# Patient Record
Sex: FEMALE | Race: WHITE | NOT HISPANIC OR LATINO | Employment: FULL TIME | URBAN - METROPOLITAN AREA
[De-identification: names, ages, dates, MRNs, and addresses within clinical notes are randomized per-mention and may not be internally consistent; named-entity substitution may affect disease eponyms.]

---

## 2017-02-16 ENCOUNTER — OFFICE VISIT (OUTPATIENT)
Dept: OBSTETRICS AND GYNECOLOGY | Facility: CLINIC | Age: 62
End: 2017-02-16
Attending: OBSTETRICS & GYNECOLOGY
Payer: COMMERCIAL

## 2017-02-16 VITALS
BODY MASS INDEX: 25.27 KG/M2 | HEIGHT: 65 IN | WEIGHT: 151.69 LBS | SYSTOLIC BLOOD PRESSURE: 124 MMHG | DIASTOLIC BLOOD PRESSURE: 84 MMHG

## 2017-02-16 DIAGNOSIS — N95.2 POSTMENOPAUSAL ATROPHIC VAGINITIS: ICD-10-CM

## 2017-02-16 DIAGNOSIS — E04.9 ENLARGED THYROID GLAND: ICD-10-CM

## 2017-02-16 DIAGNOSIS — Z85.3 HISTORY OF BREAST CANCER: ICD-10-CM

## 2017-02-16 DIAGNOSIS — Z78.0 POST-MENOPAUSAL: ICD-10-CM

## 2017-02-16 DIAGNOSIS — Z01.419 WELL WOMAN EXAM WITH ROUTINE GYNECOLOGICAL EXAM: Primary | ICD-10-CM

## 2017-02-16 PROCEDURE — 99396 PREV VISIT EST AGE 40-64: CPT | Mod: S$GLB,,, | Performed by: OBSTETRICS & GYNECOLOGY

## 2017-02-16 PROCEDURE — 99999 PR PBB SHADOW E&M-EST. PATIENT-LVL III: CPT | Mod: PBBFAC,,, | Performed by: OBSTETRICS & GYNECOLOGY

## 2017-02-16 RX ORDER — FLUTICASONE PROPIONATE 50 MCG
2 SPRAY, SUSPENSION (ML) NASAL DAILY
Refills: 6 | Status: ON HOLD | COMMUNITY
Start: 2017-01-26 | End: 2019-07-05 | Stop reason: CLARIF

## 2017-02-16 RX ORDER — BROMPHENIRAMINE MALEATE, PSEUDOEPHEDRINE HYDROCHLORIDE, AND DEXTROMETHORPHAN HYDROBROMIDE 2; 30; 10 MG/5ML; MG/5ML; MG/5ML
SYRUP ORAL
Refills: 0 | Status: ON HOLD | COMMUNITY
Start: 2017-01-20 | End: 2019-07-05 | Stop reason: CLARIF

## 2017-02-16 RX ORDER — ESTRADIOL 10 UG/1
INSERT VAGINAL
Qty: 30 TABLET | Refills: 6 | Status: SHIPPED | OUTPATIENT
Start: 2017-02-16 | End: 2018-07-24

## 2017-02-16 RX ORDER — CLARITHROMYCIN 500 MG/1
500 TABLET, FILM COATED ORAL 2 TIMES DAILY
Refills: 0 | COMMUNITY
Start: 2017-01-20 | End: 2019-02-06

## 2017-02-16 NOTE — PROGRESS NOTES
Subjective:     Patient ID: Gissel Jamison is a 61 y.o. female.     Chief Complaint: Gynecologic Exam     History of Present Illness: This patient is a 61 y.o.  female, who presents to the GYN clinic for her gyn well woman yearly exam.  She she continues to have problems with vaginal dryness and discomfort with intercourse, she would like to retry the use of local vaginal estrogen therapy.    No LMP recorded. Patient has had a hysterectomy.    Review of Systems    GENERAL: No fever, chills, fatigability or weightchange  SKIN: No rashes, itching or changes in color or texture of skin.  HEAD: No headaches or recent head trauma.  EYES: Visual acuity fine. No photophobia,r diplopia.  EARS: Denies earache or vertigo  NOSE: No loss of smell, no epistaxis or postnasal drip.  MOUTH & THROAT: No hoarseness or change in voice.   NODES: Denies swollen glands.  CHEST: Denies GRAY, cyanosis, wheezing, cough and sputum production.  CARDIOVASCULAR: Denies chest pain, PND, orthopnea or reduced exercise tolerance.  ABDOMEN: Appetite fine. No weight loss. bloating, Denies diarrhea, abdominal pain, hematemesis or blood in stool.  URINARY: No flank pain, dysuria or hematuria.  PERIPHERAL VASCULAR: No claudication or cyanosis.Varicosities  MUSCULOSKELETAL: No joint stiffness or swelling. Denies back pain.muscle aches  NEUROLOGIC: No history of seizures, paralysis, alteration of gait or coordination.       Objective:       Physical Exam     APPEARANCE: Well nourished, well developed, in no acute distress.    GENITOURINARY:  Vulva: No lesions. Normal female genital architecture.  Urethral Meatus: Normal size and location, no lesions, no prolapse.  Urethra: No masses, tenderness, prolapse or scarring.  Vagina: thin, atrophic and with decreased rugae, no discharge, no significant cystocele or rectocele.  Cervix: Absent  Uterus: Absent  Adnexa: No masses, tenderness or CDS nodularity.  Anus Perineum: No lesions, no relaxation, no external  hemorrhoids.  Breasts: Symmetrical, no skin changes or visible lesions. No palpable masses, nipple discharge or adenopathy bilaterally.  Abdomen: No masses, tenderness, hernia or ascites, no hepatasplenomegaly  Neck: Supple. Symmetric without masses.  Enlarged and irregular  Skin: No rashes, lesions, ulcers, acne, hirsutism.  Respiratory: Breath sounds clear bilateraly. Good air movement. No rales. No wheezes.  Cardiovascular: Normal rate. Regular rhythm.  Peripheral/lower extremities: No edema, erythema or tenderness.  Lymphatic: No axillary, neck or groin nodes palp.  Mental Status: Alert, oriented x 3, normal affect and mood.    @PROCEDURE:@           Assessment:      1. Well woman exam with routine gynecological exam    2. Post-menopausal    3. Postmenopausal atrophic vaginitis    4. History of breast cancer               Plan:  1.  Referral to endocrine for evaluation of thyromegaly.  2.  Discussed the pros, cons, risks, and benefits of local vaginal estrogen therapy for the treatment of vaginal dryness and dyspareunia.  The patient wishes to restart the use of local vaginal therapy  3.  Return to clinic in 1 year.              No orders of the defined types were placed in this encounter.

## 2017-04-21 ENCOUNTER — HOSPITAL ENCOUNTER (OUTPATIENT)
Dept: PULMONOLOGY | Facility: OTHER | Age: 62
Discharge: HOME | End: 2017-04-21
Attending: INTERNAL MEDICINE
Payer: COMMERCIAL

## 2017-04-21 DIAGNOSIS — J45.22 MILD INTERMITTENT ASTHMA WITH STATUS ASTHMATICUS: ICD-10-CM

## 2017-04-21 PROCEDURE — 25000242 PHARM REV CODE 250 ALT 637 W/ HCPCS

## 2017-04-21 PROCEDURE — 94727 GAS DIL/WSHOT DETER LNG VOL: CPT

## 2017-04-21 PROCEDURE — 94729 DIFFUSING CAPACITY: CPT

## 2017-04-21 PROCEDURE — 94060 EVALUATION OF WHEEZING: CPT

## 2017-04-22 NOTE — PROCEDURES
DIAGNOSIS:  Asthma.    The patient is a 62-year-old female, 65 inches tall, 150 pounds.  She has a   forced vital capacity of 2.55 L, which is 81% of predicted.  FEV1 is 2.13, which   is 92% of predicted.  FEV1/FVC is 114% of predicted.  FEF 25-75 is 83%.  There   is a 17% response to bronchodilators, which is not considered significant   response.  There is no significant response to bronchodilators in the large   airways.  Lung volumes by nitrogen washout show a total lung capacity of 88%.    The expiratory reserve volume is decreased at -2%, which can be seen with   abdominal obesity or kyphoscoliosis.  There is no evidence of a diffusion   defect.  Flow volume loop is consistent with above.    ASSESSMENT:  1.  Spirometry is normal.  She does have a 17% response to bronchodilators in   the small airways, which is not considered to be a significant response.  2.  Normal lung volumes with decreased expiratory reserve volume as discussed   above.  3.  Normal diffusing capacity.  4.  Flow volume loop is consistent with above.      CCS/  dd: 04/21/2017 16:33:25 (CDT)  td: 04/21/2017 23:27:13 (CDT)  Doc ID   #[id number]  Job ID #[id number]    CC: Magen Huerta III M.D.

## 2018-06-14 ENCOUNTER — OFFICE VISIT (OUTPATIENT)
Dept: OBSTETRICS AND GYNECOLOGY | Facility: CLINIC | Age: 63
End: 2018-06-14
Attending: OBSTETRICS & GYNECOLOGY
Payer: COMMERCIAL

## 2018-06-14 VITALS
WEIGHT: 154.31 LBS | DIASTOLIC BLOOD PRESSURE: 80 MMHG | SYSTOLIC BLOOD PRESSURE: 144 MMHG | HEIGHT: 65 IN | BODY MASS INDEX: 25.71 KG/M2

## 2018-06-14 DIAGNOSIS — N95.2 POSTMENOPAUSAL ATROPHIC VAGINITIS: ICD-10-CM

## 2018-06-14 DIAGNOSIS — Z01.419 WELL WOMAN EXAM WITH ROUTINE GYNECOLOGICAL EXAM: Primary | ICD-10-CM

## 2018-06-14 DIAGNOSIS — Z85.3 HISTORY OF BREAST CANCER: ICD-10-CM

## 2018-06-14 DIAGNOSIS — Z78.0 POST-MENOPAUSAL: ICD-10-CM

## 2018-06-14 PROCEDURE — 99396 PREV VISIT EST AGE 40-64: CPT | Mod: S$GLB,,, | Performed by: OBSTETRICS & GYNECOLOGY

## 2018-06-14 PROCEDURE — 99999 PR PBB SHADOW E&M-EST. PATIENT-LVL III: CPT | Mod: PBBFAC,,, | Performed by: OBSTETRICS & GYNECOLOGY

## 2018-06-14 RX ORDER — PANTOPRAZOLE SODIUM 40 MG/1
40 TABLET, DELAYED RELEASE ORAL 2 TIMES DAILY
Refills: 2 | COMMUNITY
Start: 2018-05-22

## 2018-06-14 RX ORDER — ESTRADIOL 0.1 MG/G
CREAM VAGINAL
Qty: 85 G | Refills: 3 | Status: SHIPPED | OUTPATIENT
Start: 2018-06-14 | End: 2018-07-24 | Stop reason: SDUPTHER

## 2018-06-14 RX ORDER — ESZOPICLONE 3 MG/1
3 TABLET, FILM COATED ORAL NIGHTLY
Refills: 2 | COMMUNITY
Start: 2018-06-03 | End: 2019-08-15

## 2018-06-14 NOTE — PROGRESS NOTES
Subjective:     Patient ID: Gissel Jamison is a 63 y.o. female.     Chief Complaint: Gynecologic Exam     History of Present Illness: This patient is a 63 y.o.  female, who presents to the GYN clinic for her gyn well woman yearly exam.  She she complains of severe vaginal discomfort associated with intercourse which lubricants have not helped.  She occasionally will have vaginal bleeding associated with intercourse and request hormone treatment.  The patient complains of significant systemic postmenopausal symptoms and by pelvic heaviness, fullness and pressure.      No LMP recorded. Patient has had a hysterectomy.    Review of Systems    GENERAL: No fever, chills, fatigability or weightchange  SKIN: No rashes, itching or changes in color or texture of skin.  HEAD: No headaches or recent head trauma.  EYES: Visual acuity fine. No photophobia,r diplopia.  EARS: Denies earache or vertigo  NOSE: No loss of smell, no epistaxis or postnasal drip.  MOUTH & THROAT: No hoarseness or change in voice.   NODES: Denies swollen glands.  CHEST: Denies GRAY, cyanosis, wheezing, cough and sputum production.  CARDIOVASCULAR: Denies chest pain, PND, orthopnea or reduced exercise tolerance.  ABDOMEN: Appetite fine. No weight loss. bloating, Denies diarrhea, abdominal pain, hematemesis or blood in stool.  URINARY: No flank pain, dysuria or hematuria.  PERIPHERAL VASCULAR: No claudication or cyanosis.Varicosities  MUSCULOSKELETAL: No joint stiffness or swelling. Denies back pain.muscle aches  NEUROLOGIC: No history of seizures, paralysis, alteration of gait or coordination.       Objective:       Physical Exam     APPEARANCE: Well nourished, well developed, in no acute distress.    GENITOURINARY:  Vulva: No lesions. Normal female genital architecture.  Urethral Meatus: Normal size and location, no lesions, no prolapse.  Urethra: No masses, tenderness, prolapse or scarring.  Vagina: thin, atrophic and with decreased rugae, no discharge,  small cystocele and rectocele.  Cervix: Absent  Uterus: Absent  Adnexa: No masses, tenderness or CDS nodularity.  Anus Perineum: No lesions, no relaxation, no external hemorrhoids.  Breasts: asymmetrical, no skin changes or visible lesions. No palpable masses, surgical scars are noted ,nipple discharge or adenopathy bilaterally.  Abdomen: No masses, tenderness, hernia or ascites, no hepatasplenomegaly  Neck: Supple. Symmetric without masses. No thyromegaly.  Skin: No rashes, lesions, ulcers, acne, hirsutism.  Respiratory: Breath sounds clear bilateraly. Good air movement. No rales. No wheezes.  Cardiovascular: Normal rate. Regular rhythm.  Peripheral/lower extremities: No edema, erythema or tenderness.  Lymphatic: No axillary, neck or groin nodes palp.  Mental Status: Alert, oriented x 3, normal affect and mood.    @PROCEDURE:@           Assessment:      1. Well woman exam with routine gynecological exam    2. Post-menopausal    3. Postmenopausal atrophic vaginitis    4. History of breast cancer               Plan:  1.  Discussed the pros, cons, risks, and benefits of vaginal estrogen hormone therapy for the treatment of postmenopausal atrophic vaginitis.  The amount of absorption of vaginal estrogen and the risk that that absorption creates in terms of breast cancer recurrence is unknown.  The patient is aware of the fact that vaginal estrogen hormone therapy chloride increase her risk of a breast cancer recurrence but feels that the risk is for outweighed by the benefit and therefore request this hormone therapy be prescribed.  2.  Estrace vaginal cream 1 applicator a day for week one half applicator a day for week and one fourth applicator Monday and Thursday will be prescribed the patient will have a 3-4 week follow-up visit to determine the effect of this vaginal therapy on her vaginal atrophy.  3.  Discussed the use of the reduced dose of SSRI for menopausal symptoms.  Discussed the side effects which are  decreased libido and weight gain.  The patient will decide on whether she wishes to use the Paxil SSRI RI for postmenopausal symptoms.  4.  Refer to pelvic floor physical therapist for evaluation of pelvic heaviness and discomfort.  5.  Return to clinic in 3-4 weeks for   Prep wet and reevaluation.                No orders of the defined types were placed in this encounter.         and

## 2018-06-26 ENCOUNTER — TELEPHONE (OUTPATIENT)
Dept: OBSTETRICS AND GYNECOLOGY | Facility: CLINIC | Age: 63
End: 2018-06-26

## 2018-06-26 NOTE — TELEPHONE ENCOUNTER
----- Message from John Pyle sent at 6/26/2018  9:33 AM CDT -----  Contact: pt            Name of Who is Calling: pt      What is the request in detail: is having questions about a RX       Can the clinic reply by [facility]: no      What Number to Call Back if not in [facility]: [phone]

## 2018-07-24 ENCOUNTER — TELEPHONE (OUTPATIENT)
Dept: OBSTETRICS AND GYNECOLOGY | Facility: CLINIC | Age: 63
End: 2018-07-24

## 2018-07-24 DIAGNOSIS — N95.2 POSTMENOPAUSAL ATROPHIC VAGINITIS: ICD-10-CM

## 2018-07-24 DIAGNOSIS — Z78.0 POST-MENOPAUSAL: ICD-10-CM

## 2018-07-24 DIAGNOSIS — Z01.419 WELL WOMAN EXAM WITH ROUTINE GYNECOLOGICAL EXAM: ICD-10-CM

## 2018-07-24 DIAGNOSIS — Z85.3 HISTORY OF BREAST CANCER: ICD-10-CM

## 2018-07-24 RX ORDER — ESTRADIOL 0.1 MG/G
CREAM VAGINAL
Qty: 3 TUBE | Refills: 3 | Status: ON HOLD | OUTPATIENT
Start: 2018-07-24 | End: 2019-07-05 | Stop reason: CLARIF

## 2018-07-24 NOTE — TELEPHONE ENCOUNTER
States having problems with pharmacy and insurance co refilling estrace cream. Needs 3 mo supply and new rx to see if this can be corrected.

## 2018-07-24 NOTE — TELEPHONE ENCOUNTER
----- Message from Judy Howard sent at 7/24/2018 10:10 AM CDT -----  Contact: pt            Name of Who is Calling: pt      What is the request in detail: pt is having problems with estradiol (ESTRACE) 0.01 % (0.1 mg/gram) vaginal cream. Wants to explain to the nurse.      Can the clinic reply by [facility]: no      What Number to Call Back if not in [facility]: [phone]

## 2019-02-08 PROBLEM — M47.892 OTHER SPONDYLOSIS, CERVICAL REGION: Status: ACTIVE | Noted: 2019-02-08

## 2019-07-04 ENCOUNTER — HOSPITAL ENCOUNTER (EMERGENCY)
Facility: HOSPITAL | Age: 64
Discharge: ANOTHER HOSPITAL | End: 2019-07-04
Attending: SURGERY
Payer: COMMERCIAL

## 2019-07-04 ENCOUNTER — HOSPITAL ENCOUNTER (INPATIENT)
Facility: HOSPITAL | Age: 64
LOS: 14 days | Discharge: HOME | DRG: 020 | End: 2019-07-18
Attending: PSYCHIATRY & NEUROLOGY | Admitting: PSYCHIATRY & NEUROLOGY
Payer: COMMERCIAL

## 2019-07-04 VITALS
TEMPERATURE: 99 F | HEIGHT: 65 IN | OXYGEN SATURATION: 95 % | RESPIRATION RATE: 20 BRPM | DIASTOLIC BLOOD PRESSURE: 64 MMHG | BODY MASS INDEX: 25.83 KG/M2 | WEIGHT: 155 LBS | HEART RATE: 83 BPM | SYSTOLIC BLOOD PRESSURE: 146 MMHG

## 2019-07-04 DIAGNOSIS — I60.9 SAH (SUBARACHNOID HEMORRHAGE): ICD-10-CM

## 2019-07-04 DIAGNOSIS — Z29.89 SEIZURE PROPHYLAXIS: ICD-10-CM

## 2019-07-04 DIAGNOSIS — I60.8 SUBARACHNOID HEMORRHAGE DUE TO RUPTURED ANEURYSM: Primary | ICD-10-CM

## 2019-07-04 DIAGNOSIS — I67.1 ANEURYSM OF ANTERIOR COMMUNICATING ARTERY: ICD-10-CM

## 2019-07-04 DIAGNOSIS — I60.2 NONTRAUMATIC SUBARACHNOID HEMORRHAGE FROM ANTERIOR COMMUNICATING ARTERY: ICD-10-CM

## 2019-07-04 DIAGNOSIS — I62.9 INTRACRANIAL BLEED: ICD-10-CM

## 2019-07-04 LAB
ABO + RH BLD: NORMAL
ALBUMIN SERPL BCP-MCNC: 4.3 G/DL (ref 3.5–5.2)
ALP SERPL-CCNC: 103 U/L (ref 38–126)
ALT SERPL W/O P-5'-P-CCNC: 19 U/L (ref 10–44)
ANION GAP SERPL CALC-SCNC: 9 MMOL/L (ref 8–16)
APTT BLDCRRT: 33 SEC (ref 21–32)
AST SERPL-CCNC: 20 U/L (ref 15–46)
BASOPHILS # BLD AUTO: 0.02 K/UL (ref 0–0.2)
BASOPHILS NFR BLD: 0.3 % (ref 0–1.9)
BILIRUB SERPL-MCNC: 0.7 MG/DL (ref 0.1–1)
BLD GP AB SCN CELLS X3 SERPL QL: NORMAL
BUN SERPL-MCNC: 17 MG/DL (ref 7–17)
CALCIUM SERPL-MCNC: 9.7 MG/DL (ref 8.7–10.5)
CHLORIDE SERPL-SCNC: 104 MMOL/L (ref 95–110)
CO2 SERPL-SCNC: 29 MMOL/L (ref 23–29)
CREAT SERPL-MCNC: 0.76 MG/DL (ref 0.5–1.4)
DIFFERENTIAL METHOD: ABNORMAL
EOSINOPHIL # BLD AUTO: 0.1 K/UL (ref 0–0.5)
EOSINOPHIL NFR BLD: 1 % (ref 0–8)
ERYTHROCYTE [DISTWIDTH] IN BLOOD BY AUTOMATED COUNT: 14 % (ref 11.5–14.5)
EST. GFR  (AFRICAN AMERICAN): >60 ML/MIN/1.73 M^2
EST. GFR  (NON AFRICAN AMERICAN): >60 ML/MIN/1.73 M^2
GLUCOSE SERPL-MCNC: 126 MG/DL (ref 70–110)
HCT VFR BLD AUTO: 41.4 % (ref 37–48.5)
HGB BLD-MCNC: 12.7 G/DL (ref 12–16)
INR PPP: 1.1 (ref 0.8–1.2)
LYMPHOCYTES # BLD AUTO: 2 K/UL (ref 1–4.8)
LYMPHOCYTES NFR BLD: 28.1 % (ref 18–48)
MCH RBC QN AUTO: 26.9 PG (ref 27–31)
MCHC RBC AUTO-ENTMCNC: 30.7 G/DL (ref 32–36)
MCV RBC AUTO: 88 FL (ref 82–98)
MONOCYTES # BLD AUTO: 0.7 K/UL (ref 0.3–1)
MONOCYTES NFR BLD: 9.5 % (ref 4–15)
NEUTROPHILS # BLD AUTO: 4.3 K/UL (ref 1.8–7.7)
NEUTROPHILS NFR BLD: 61.1 % (ref 38–73)
PLATELET # BLD AUTO: 286 K/UL (ref 150–350)
PMV BLD AUTO: 9.2 FL (ref 9.2–12.9)
POTASSIUM SERPL-SCNC: 3.9 MMOL/L (ref 3.5–5.1)
PROT SERPL-MCNC: 7.6 G/DL (ref 6–8.4)
PROTHROMBIN TIME: 11.8 SEC (ref 9–12.5)
RBC # BLD AUTO: 4.72 M/UL (ref 4–5.4)
SODIUM SERPL-SCNC: 142 MMOL/L (ref 136–145)
WBC # BLD AUTO: 7.08 K/UL (ref 3.9–12.7)

## 2019-07-04 PROCEDURE — 25000003 PHARM REV CODE 250: Performed by: EMERGENCY MEDICINE

## 2019-07-04 PROCEDURE — 99291 CRITICAL CARE FIRST HOUR: CPT | Mod: ,,, | Performed by: PSYCHIATRY & NEUROLOGY

## 2019-07-04 PROCEDURE — 85025 COMPLETE CBC W/AUTO DIFF WBC: CPT | Mod: ER

## 2019-07-04 PROCEDURE — 96375 TX/PRO/DX INJ NEW DRUG ADDON: CPT

## 2019-07-04 PROCEDURE — 99291 PR CRITICAL CARE, E/M 30-74 MINUTES: ICD-10-PCS | Mod: ,,, | Performed by: EMERGENCY MEDICINE

## 2019-07-04 PROCEDURE — 99291 CRITICAL CARE FIRST HOUR: CPT | Mod: 25,ER

## 2019-07-04 PROCEDURE — 99222 1ST HOSP IP/OBS MODERATE 55: CPT | Mod: ,,, | Performed by: NEUROLOGICAL SURGERY

## 2019-07-04 PROCEDURE — 25000003 PHARM REV CODE 250: Mod: ER | Performed by: SURGERY

## 2019-07-04 PROCEDURE — 99291 CRITICAL CARE FIRST HOUR: CPT | Mod: ,,, | Performed by: EMERGENCY MEDICINE

## 2019-07-04 PROCEDURE — 85610 PROTHROMBIN TIME: CPT | Mod: ER

## 2019-07-04 PROCEDURE — 93010 ELECTROCARDIOGRAM REPORT: CPT | Mod: ,,, | Performed by: INTERNAL MEDICINE

## 2019-07-04 PROCEDURE — 99222 PR INITIAL HOSPITAL CARE,LEVL II: ICD-10-PCS | Mod: ,,, | Performed by: NEUROLOGICAL SURGERY

## 2019-07-04 PROCEDURE — 27000221 HC OXYGEN, UP TO 24 HOURS

## 2019-07-04 PROCEDURE — 93010 EKG 12-LEAD: ICD-10-PCS | Mod: ,,, | Performed by: INTERNAL MEDICINE

## 2019-07-04 PROCEDURE — 25000003 PHARM REV CODE 250

## 2019-07-04 PROCEDURE — 96375 TX/PRO/DX INJ NEW DRUG ADDON: CPT | Mod: ER

## 2019-07-04 PROCEDURE — 25500020 PHARM REV CODE 255: Performed by: EMERGENCY MEDICINE

## 2019-07-04 PROCEDURE — 63600175 PHARM REV CODE 636 W HCPCS: Mod: ER | Performed by: SURGERY

## 2019-07-04 PROCEDURE — 80053 COMPREHEN METABOLIC PANEL: CPT | Mod: ER

## 2019-07-04 PROCEDURE — 96366 THER/PROPH/DIAG IV INF ADDON: CPT

## 2019-07-04 PROCEDURE — 85730 THROMBOPLASTIN TIME PARTIAL: CPT | Mod: ER

## 2019-07-04 PROCEDURE — 86850 RBC ANTIBODY SCREEN: CPT

## 2019-07-04 PROCEDURE — 20000000 HC ICU ROOM

## 2019-07-04 PROCEDURE — 99291 CRITICAL CARE FIRST HOUR: CPT | Mod: 25

## 2019-07-04 PROCEDURE — 94761 N-INVAS EAR/PLS OXIMETRY MLT: CPT

## 2019-07-04 PROCEDURE — 93005 ELECTROCARDIOGRAM TRACING: CPT | Mod: ER

## 2019-07-04 PROCEDURE — 63600175 PHARM REV CODE 636 W HCPCS

## 2019-07-04 PROCEDURE — 96365 THER/PROPH/DIAG IV INF INIT: CPT | Mod: ER

## 2019-07-04 PROCEDURE — 99291 PR CRITICAL CARE, E/M 30-74 MINUTES: ICD-10-PCS | Mod: ,,, | Performed by: PSYCHIATRY & NEUROLOGY

## 2019-07-04 RX ORDER — NICARDIPINE HYDROCHLORIDE 0.2 MG/ML
5 INJECTION INTRAVENOUS CONTINUOUS
Status: DISCONTINUED | OUTPATIENT
Start: 2019-07-04 | End: 2019-07-06

## 2019-07-04 RX ORDER — NICARDIPINE HYDROCHLORIDE 0.2 MG/ML
1 INJECTION INTRAVENOUS CONTINUOUS
Status: DISCONTINUED | OUTPATIENT
Start: 2019-07-04 | End: 2019-07-04 | Stop reason: HOSPADM

## 2019-07-04 RX ORDER — ONDANSETRON 2 MG/ML
4 INJECTION INTRAMUSCULAR; INTRAVENOUS
Status: COMPLETED | OUTPATIENT
Start: 2019-07-04 | End: 2019-07-04

## 2019-07-04 RX ORDER — ONDANSETRON 2 MG/ML
4 INJECTION INTRAMUSCULAR; INTRAVENOUS EVERY 6 HOURS PRN
Status: DISCONTINUED | OUTPATIENT
Start: 2019-07-04 | End: 2019-07-18 | Stop reason: HOSPADM

## 2019-07-04 RX ORDER — ACETAMINOPHEN 325 MG/1
650 TABLET ORAL EVERY 6 HOURS PRN
Status: DISCONTINUED | OUTPATIENT
Start: 2019-07-04 | End: 2019-07-11

## 2019-07-04 RX ORDER — SODIUM CHLORIDE 9 MG/ML
1000 INJECTION, SOLUTION INTRAVENOUS
Status: COMPLETED | OUTPATIENT
Start: 2019-07-04 | End: 2019-07-04

## 2019-07-04 RX ORDER — MORPHINE SULFATE 4 MG/ML
4 INJECTION, SOLUTION INTRAMUSCULAR; INTRAVENOUS
Status: COMPLETED | OUTPATIENT
Start: 2019-07-04 | End: 2019-07-04

## 2019-07-04 RX ORDER — ONDANSETRON 2 MG/ML
INJECTION INTRAMUSCULAR; INTRAVENOUS
Status: COMPLETED
Start: 2019-07-04 | End: 2019-07-04

## 2019-07-04 RX ORDER — LEVOCETIRIZINE DIHYDROCHLORIDE 5 MG/1
5 TABLET, FILM COATED ORAL DAILY
COMMUNITY
End: 2019-08-15

## 2019-07-04 RX ORDER — ACETAMINOPHEN 325 MG/1
TABLET ORAL
Status: COMPLETED
Start: 2019-07-04 | End: 2019-07-04

## 2019-07-04 RX ADMIN — NICARDIPINE HYDROCHLORIDE 4 MG/HR: 0.2 INJECTION, SOLUTION INTRAVENOUS at 01:07

## 2019-07-04 RX ADMIN — ONDANSETRON 4 MG: 2 INJECTION INTRAMUSCULAR; INTRAVENOUS at 06:07

## 2019-07-04 RX ADMIN — NICARDIPINE HYDROCHLORIDE 5 MG/HR: 0.2 INJECTION, SOLUTION INTRAVENOUS at 02:07

## 2019-07-04 RX ADMIN — SODIUM CHLORIDE 1000 ML: 0.9 INJECTION, SOLUTION INTRAVENOUS at 11:07

## 2019-07-04 RX ADMIN — IOHEXOL 100 ML: 350 INJECTION, SOLUTION INTRAVENOUS at 03:07

## 2019-07-04 RX ADMIN — ONDANSETRON 4 MG: 2 INJECTION INTRAMUSCULAR; INTRAVENOUS at 11:07

## 2019-07-04 RX ADMIN — ACETAMINOPHEN 650 MG: 325 TABLET ORAL at 06:07

## 2019-07-04 RX ADMIN — NICARDIPINE HYDROCHLORIDE 3 MG/HR: 0.2 INJECTION, SOLUTION INTRAVENOUS at 12:07

## 2019-07-04 RX ADMIN — MORPHINE SULFATE 4 MG: 4 INJECTION INTRAVENOUS at 11:07

## 2019-07-04 RX ADMIN — NICARDIPINE HYDROCHLORIDE 5 MG/HR: 0.2 INJECTION, SOLUTION INTRAVENOUS at 06:07

## 2019-07-04 RX ADMIN — NICARDIPINE HYDROCHLORIDE 1 MG/HR: 0.2 INJECTION, SOLUTION INTRAVENOUS at 12:07

## 2019-07-04 NOTE — HPI
64 y F with medical h.o breast cancer s/p mastectomy, lumpectomy, who presented to the ER as a transfer from [facility] as SAH detected on CTH.  Patient's symptoms started on Tuesday 7/2 when she was undergoing a CT abdomen for evaluation of indigestion. She started to feel a pounding and congestion of head and ears. She did not seek medical attention till today 7/4 when she went to urgent care. Urgent care doc did not feel comfortable treating it as migraine and sent patient to ER; where CT head detected SAH. She is transferred here to [facility].  Here a CTA detected CAROLINA aneurysm and patient is being sent for angio and further neurosurg intervention,.

## 2019-07-04 NOTE — H&P
[facility] [address]  Neurology  History & Physical    Patient Name: Gissel Jamison  MRN: [id number]   Admission Date: 7/4/2019  Code Status: No Order   Attending Provider: YAIMA Alvarez  Primary Care Physician: Magen Huerta Iii, MD  Principal Problem:<principal problem not specified>    Subjective:     Chief Complaint:  SAH on CTH    HPI:   64 y F with medical h.o breast cancer s/p mastectomy, lumpectomy, who presented to the ER as a transfer from [facility] as SAH detected on CTH.  Patient's symptoms started on Tuesday 7/2 when she was undergoing a CT abdomen for evaluation of indigestion. She started to feel a pounding and congestion of head and ears. She did not seek medical attention till today 7/4 when she went to urgent care. Urgent care doc did not feel comfortable treating it as migraine and sent patient to ER; where CT head detected SAH. She is transferred here to [facility].  Here a CTA detected CAROLINA aneurysm and patient is being sent for angio and further neurosurg intervention,.    Past Medical History:   Diagnosis Date    Asthma     Breast cancer     Fibromyalgia     PONV (postoperative nausea and vomiting)     for general anesthesia       Past Surgical History:   Procedure Laterality Date    Arm Surgery      torn tendon    BACK SURGERY      x2    HYSTERECTOMY      RENETTA    MASTECTOMY      with reconstruction left breast    OOPHORECTOMY      RADIOFREQUENCY THERMAL COAGULATION, NERVE, SPINAL, CERVICAL, POSTERIOR RAMUS, MEDIAL BRANCH Left 2/8/2019    Performed by Raymond Britton MD at [facility] OR    Tonsillectomy      TONSILLECTOMY         Review of patient's allergies indicates:   Allergen Reactions    Codeine Nausea And Vomiting    Pcn [penicillins] Nausea And Vomiting    Sulfa (sulfonamide antibiotics) Nausea And Vomiting    Sulfanilamide        Current Neurological Medications:     Current Facility-Administered Medications on File Prior to Encounter   Medication    0.9%   NaCl infusion    [COMPLETED] 0.9%  NaCl infusion    [COMPLETED] morphine injection 4 mg    [COMPLETED] ondansetron injection 4 mg    sodium chloride 0.9% flush 10 mL    [DISCONTINUED] lorazepam (ATIVAN) injection 1 mg    [DISCONTINUED] niCARdipine 40 mg/200 mL infusion     Current Outpatient Medications on File Prior to Encounter   Medication Sig    eszopiclone 3 mg Tab Take 3 mg by mouth nightly.    levocetirizine (XYZAL) 5 MG tablet Take 5 mg by mouth once daily.    montelukast (SINGULAIR) 10 mg tablet     pantoprazole (PROTONIX) 40 MG tablet Take 40 mg by mouth every morning.    ADVAIR DISKUS 250-50 mcg/dose diskus inhaler     brompheniramine-pseudoeph-DM 2-30-10 mg/5 mL Syrp TAKE 1 TEASPOON EVERY 6 HOURS AS NEEDED FOR CONGESTION/COUGH    estradiol (ESTRACE) 0.01 % (0.1 mg/gram) vaginal cream Insert 1gm vaginally HS on Mondays and Thursdays    fluticasone (FLONASE) 50 mcg/actuation nasal spray 2 sprays by Each Nare route once daily.    PROAIR HFA 90 mcg/actuation inhaler Inhale 1 puff into the lungs every 4 to 6 hours as needed.     Family History     None        Tobacco Use    Smoking status: Never Smoker    Smokeless tobacco: Never Used   Substance and Sexual Activity    Alcohol use: No    Drug use: No    Sexual activity: Yes     Partners: Male     Birth control/protection: Post-menopausal, Surgical     Review of Systems   Constitutional: Negative.    HENT: Negative.    Eyes: Negative.    Respiratory: Negative.    Cardiovascular: Negative.    Gastrointestinal: Negative.    Endocrine: Negative.    Genitourinary: Negative.    Musculoskeletal: Negative.    Allergic/Immunologic: Negative.    Neurological: Positive for headaches.   Hematological: Negative.    Psychiatric/Behavioral: Negative.      Objective:     Vital Signs (Most Recent):  Temp: 97.9 °F (36.6 °C) (07/04/19 1404)  Pulse: 103 (07/04/19 1651)  Resp: 19 (07/04/19 1651)  BP: 129/60 (07/04/19 1651)  SpO2: 97 % (07/04/19 1651) Vital  Signs (24h Range):  Temp:  [97.9 °F (36.6 °C)-98.5 °F (36.9 °C)] 97.9 °F (36.6 °C)  Pulse:  [] 103  Resp:  [11-20] 19  SpO2:  [92 %-99 %] 97 %  BP: (123-185)/(55-85) 129/60        There is no height or weight on file to calculate BMI.    Physical Exam   Constitutional: She is oriented to person, place, and time. She appears well-developed and well-nourished.   HENT:   Head: Normocephalic.   Eyes: Pupils are equal, round, and reactive to light. Conjunctivae and EOM are normal.   Neck: Normal range of motion.   Cardiovascular: Normal rate and regular rhythm.   Pulmonary/Chest: Effort normal and breath sounds normal.   Abdominal: Soft.   Neurological: She is alert and oriented to person, place, and time. She displays normal reflexes. No cranial nerve deficit or sensory deficit. She exhibits normal muscle tone. She has a normal Finger-Nose-Finger Test. Coordination normal.   Reflex Scores:       Tricep reflexes are 2+ on the right side and 2+ on the left side.       Bicep reflexes are 2+ on the right side and 2+ on the left side.       Brachioradialis reflexes are 2+ on the right side and 2+ on the left side.       Patellar reflexes are 2+ on the right side and 2+ on the left side.       Achilles reflexes are 2+ on the right side and 2+ on the left side.  Skin: Skin is warm.   Psychiatric: She has a normal mood and affect. Her behavior is normal. Judgment and thought content normal.       NEUROLOGICAL EXAMINATION:     MENTAL STATUS   Oriented to person, place, and time.   Attention: normal. Concentration: normal.   Level of consciousness: alert  Knowledge: good.   Normal comprehension.     CRANIAL NERVES   Cranial nerves II through XII intact.     CN III, IV, VI   Pupils are equal, round, and reactive to light.  Extraocular motions are normal.     MOTOR EXAM   Muscle bulk: normal  Overall muscle tone: normal  Right arm tone: normal  Left arm tone: normal  Right arm pronator drift: absent  Left arm pronator drift:  absent  Right leg tone: normal  Left leg tone: normal    Strength   Right neck flexion: 5/5  Left neck flexion: 5/5  Right neck extension: 5/5  Left neck extension: 5/5  Right deltoid: 5/5  Left deltoid: 5/5  Right biceps: 5/5  Left biceps: 5/5  Right triceps: 5/5  Left triceps: 5/5  Right wrist flexion: 5/5  Left wrist flexion: 5/5  Right wrist extension: 5/5  Left wrist extension: 5/5  Right interossei: 5/5  Left interossei: 5/5  Right abdominals: 5/5  Left abdominals: 5/5  Right iliopsoas: 5/5  Left iliopsoas: 5/5  Right quadriceps: 5/5  Left quadriceps: 5/5  Right hamstrin/5  Left hamstrin/5  Right glutei: 5/5  Left glutei: 5/5  Right anterior tibial: 5/5  Left anterior tibial: 5/5  Right posterior tibial: 5/5  Left posterior tibial: 5/5  Right peroneal: 5/5  Left peroneal: 5/5  Right gastroc: 5/5  Left gastroc: 5/5    REFLEXES     Reflexes   Right brachioradialis: 2+  Left brachioradialis: 2+  Right biceps: 2+  Left biceps: 2+  Right triceps: 2+  Left triceps: 2+  Right patellar: 2+  Left patellar: 2+  Right achilles: 2+  Left achilles: 2+  Right : 2+  Left : 2+    SENSORY EXAM   Light touch normal.     GAIT AND COORDINATION      Coordination   Finger to nose coordination: normal      Significant Labs: All pertinent lab results from the past 24 hours have been reviewed.    Significant Imaging: I have reviewed all pertinent imaging results/findings within the past 24 hours.  I have reviewed and interpreted all pertinent imaging results/findings within the past 24 hours.    Assessment and Plan:     Seizure prophylaxis  keppra 500 mg po BID     Aneurysm of anterior communicating artery  See SAH    SAH (subarachnoid hemorrhage)  CTH with SAH  CTA with CAROLINA aneurysm  IR angio and neurosurg intervention  Nsgy following, appreciate recs  Keppra 500 BID x 7 days  TCDs daily x 14 days  SBP<140  Na>135  euvolemia        VTE Risk Mitigation (From admission, onward)    None          Yamilka V Kameron,  MD  Neurology  [facility]

## 2019-07-04 NOTE — ASSESSMENT & PLAN NOTE
CTH with SAH  CTA with CAROLINA aneurysm  IR angio and neurosurg intervention  Nsgy following, appreciate recs  Keppra 500 BID x 7 days  TCDs daily x 14 days  SBP<140  Na>135  euvolemia

## 2019-07-04 NOTE — ED PROVIDER NOTES
"Encounter Date: 7/4/2019       History     Chief Complaint   Patient presents with    Headache     Pt states "I went Tuesday and had a barium and when I went to lay down my ears filled with pressure. My head started to hurt so bad. Two days later my head still hurts and all the pressure still rushes to my head. I threw up for four hours when I got home. I have been checking my pressure and it hasn't gotten high." No meds today.      64-year-old Onset headache 2 days ago after barium swallow.  She developed the headache in the room before she laid on the table.  Pain has not gone away and she came in for treatment of her headache.  She does not complain of any dysarthria motor paralysis or sensory loss or visual change    The history is provided by the patient.   Headache    This is a new problem. The current episode started in the past 7 days. The problem occurs constantly. The problem has been unchanged. The pain is located in the bilateral region. The pain does not radiate. The pain quality is not similar to prior headaches. The quality of the pain is described as aching. The pain is at a severity of 10/10. Associated symptoms include neck pain. Pertinent negatives include no abdominal pain. Nothing aggravates the symptoms. She has tried nothing for the symptoms. The treatment provided no relief.     Review of patient's allergies indicates:   Allergen Reactions    Codeine Nausea And Vomiting    Pcn [penicillins] Nausea And Vomiting    Sulfa (sulfonamide antibiotics) Nausea And Vomiting    Sulfanilamide      Past Medical History:   Diagnosis Date    Asthma     Breast cancer     Fibromyalgia     PONV (postoperative nausea and vomiting)     for general anesthesia     Past Surgical History:   Procedure Laterality Date    Arm Surgery      torn tendon    BACK SURGERY      x2    HYSTERECTOMY      RENETTA    MASTECTOMY      with reconstruction left breast    OOPHORECTOMY      RADIOFREQUENCY THERMAL COAGULATION, " NERVE, SPINAL, CERVICAL, POSTERIOR RAMUS, MEDIAL BRANCH Left 2/8/2019    Performed by Raymond [facility]on [facility] at [facility]    Tonsillectomy      TONSILLECTOMY       Family History   Problem Relation Age of Onset    Breast cancer Neg Hx     Ovarian cancer Neg Hx      Social History     Tobacco Use    Smoking status: Never Smoker    Smokeless tobacco: Never Used   Substance Use Topics    Alcohol use: No    Drug use: No     Review of Systems   Constitutional: Negative.    HENT: Negative.    Eyes: Negative.    Cardiovascular: Negative.    Gastrointestinal: Negative.  Negative for abdominal pain.   Endocrine: Negative.    Genitourinary: Negative.    Musculoskeletal: Positive for neck pain.   Skin: Negative.    Allergic/Immunologic: Negative.    Neurological: Positive for headaches.   Hematological: Negative.    Psychiatric/Behavioral: Negative.        Physical Exam     Initial Vitals [07/04/19 1027]   BP Pulse Resp Temp SpO2   (!) 169/85 92 20 98.5 °F (36.9 °C) 98 %      MAP       --         Physical Exam    Nursing note and vitals reviewed.  Constitutional: She appears well-developed and well-nourished.   HENT:   Head: Normocephalic and atraumatic.   Eyes: Conjunctivae are normal.   Neck: Normal range of motion.       Pain on palpation   Cardiovascular: Normal rate.   Pulmonary/Chest: Breath sounds normal.   Abdominal: Soft.   Neurological: She is alert and oriented to person, place, and time. GCS score is 15. GCS eye subscore is 4. GCS verbal subscore is 5. GCS motor subscore is 6.   NIH stroke scale 0   Skin: Skin is warm. Capillary refill takes less than 2 seconds.   Psychiatric: She has a normal mood and affect.         ED Course   Critical Care  Date/Time: 7/4/2019 12:54 PM  Performed by: FERNANDO Bailey III, MD  Authorized by: FERNANDO Bailey III, MD   Direct patient critical care time: 30 minutes  Total critical care time (exclusive of procedural time) : 30 minutes        Labs Reviewed   CBC W/ AUTO  DIFFERENTIAL - Abnormal; Notable for the following components:       Result Value    Mean Corpuscular Hemoglobin 26.9 (*)     Mean Corpuscular Hemoglobin Conc 30.7 (*)     All other components within normal limits   COMPREHENSIVE METABOLIC PANEL - Abnormal; Notable for the following components:    Glucose 126 (*)     All other components within normal limits   APTT - Abnormal; Notable for the following components:    aPTT 33.0 (*)     All other components within normal limits   PROTIME-INR   APTT   PROTIME-INR     EKG Readings: (Independently Interpreted)   Initial Reading: No STEMI. Rhythm: Normal Sinus Rhythm.   Nonspecific ST abnormality       Imaging Results          CT Head Without Contrast (Final result)  Result time 07/04/19 11:25:22    Final result by Alex Juares Jr., MD (07/04/19 11:25:22)                 Impression:      1. Subarachnoid hemorrhage.  In the setting of no trauma suspect aneurysm.  Consider follow-up CTA/MRA.    COMMUNICATION  This critical result was received at [facility].  The critical information above was relayed directly by me by telephone to Dr. Bailey on 07/04/2019 at 11:22.    1. Subarachnoid hemorrhage.  All CT scans at this facility are performed  using dose modulation techniques as appropriate to performed exam including the following:  automated exposure control; adjustment of mA and/or kV according to the patients size (this includes techniques or standardized protocols for targeted exams where dose is matched to indication/reason for exam: i.e. extremities or head);  iterative reconstruction technique.      Electronically signed by: Alex Juares Jr., MD  Date:    07/04/2019  Time:    11:25             Narrative:    EXAMINATION:  CT HEAD WITHOUT CONTRAST    CLINICAL HISTORY:  Headache, acute, norm neuro exam;    TECHNIQUE:  CT scan was obtained of the head without administration of contrast.    COMPARISON:  None    FINDINGS:  Diffuse subarachnoid hemorrhage within  the basal cisterns as well as in the midline falx.Ventricles and basal cisterns are normal.  No mass effect or midline shift.  No hydrocephalus.  No cerebral or cerebellar parenchymal abnormality.  Paranasal sinuses are clear.  Mastoid air cells are clear.  Calvarium is intact.                                 Medical Decision Making:   Initial Assessment:   Subarachnoid hemorrhage with history of headache for 2 days.  Neurologically intact.  Marginal hypertension  ED Management:  Patient neurologic intact with evidence of a intracranial bleed on CT scan of head.  no history of trauma.  Probable aneurysm .patient will be transferred to [facility].  Patient's blood pressure was lowered to 140 and below with Cardene IV drip                      Clinical Impression:       ICD-10-CM ICD-9-CM   1. Subarachnoid hemorrhage due to ruptured aneurysm I60.8 430   2. Intracranial bleed I62.9 432.9         Disposition:   Disposition: Transferred  Condition: Critical                        FERNANDO Bailey III, MD  07/04/19 1254

## 2019-07-04 NOTE — ED NOTES
Pat from [facility] notified of need for transfer to [facility] for Subarachnoid hemorrhage

## 2019-07-04 NOTE — HPI
65 yo with hx of breast cancer presents to the ED with complaints of headache after a barium CT abdomen on Tuesday. Reports intractable headache which began 2 days ago. Associated n/v, neck pain. Denies visual changes, weakness, paresthesias, b/b dysfunction. No trauma, falls, or LOC. She does not take antiplatelet therapy or anticoagulants. Currently on a cardene drip started at the outside hospital. She states she is tired from the morphine given prior to transport. Family at bedside.

## 2019-07-04 NOTE — SUBJECTIVE & OBJECTIVE
Past Medical History:   Diagnosis Date    Asthma     Breast cancer     Fibromyalgia     PONV (postoperative nausea and vomiting)     for general anesthesia       Past Surgical History:   Procedure Laterality Date    Arm Surgery      torn tendon    BACK SURGERY      x2    HYSTERECTOMY      RENETTA    MASTECTOMY      with reconstruction left breast    OOPHORECTOMY      RADIOFREQUENCY THERMAL COAGULATION, NERVE, SPINAL, CERVICAL, POSTERIOR RAMUS, MEDIAL BRANCH Left 2/8/2019    Performed by Raymond [facility]on MD at [facility] OR    Tonsillectomy      TONSILLECTOMY         Review of patient's allergies indicates:   Allergen Reactions    Codeine Nausea And Vomiting    Pcn [penicillins] Nausea And Vomiting    Sulfa (sulfonamide antibiotics) Nausea And Vomiting    Sulfanilamide        Current Neurological Medications:     Current Facility-Administered Medications on File Prior to Encounter   Medication    0.9%  NaCl infusion    [COMPLETED] 0.9%  NaCl infusion    [COMPLETED] morphine injection 4 mg    [COMPLETED] ondansetron injection 4 mg    sodium chloride 0.9% flush 10 mL    [DISCONTINUED] lorazepam (ATIVAN) injection 1 mg    [DISCONTINUED] niCARdipine 40 mg/200 mL infusion     Current Outpatient Medications on File Prior to Encounter   Medication Sig    eszopiclone 3 mg Tab Take 3 mg by mouth nightly.    levocetirizine (XYZAL) 5 MG tablet Take 5 mg by mouth once daily.    montelukast (SINGULAIR) 10 mg tablet     pantoprazole (PROTONIX) 40 MG tablet Take 40 mg by mouth every morning.    ADVAIR DISKUS 250-50 mcg/dose diskus inhaler     brompheniramine-pseudoeph-DM 2-30-10 mg/5 mL Syrp TAKE 1 TEASPOON EVERY 6 HOURS AS NEEDED FOR CONGESTION/COUGH    estradiol (ESTRACE) 0.01 % (0.1 mg/gram) vaginal cream Insert 1gm vaginally HS on Mondays and Thursdays    fluticasone (FLONASE) 50 mcg/actuation nasal spray 2 sprays by Each Nare route once daily.    PROAIR HFA 90 mcg/actuation inhaler Inhale 1 puff into  the lungs every 4 to 6 hours as needed.     Family History     None        Tobacco Use    Smoking status: Never Smoker    Smokeless tobacco: Never Used   Substance and Sexual Activity    Alcohol use: No    Drug use: No    Sexual activity: Yes     Partners: Male     Birth control/protection: Post-menopausal, Surgical     Review of Systems   Constitutional: Negative.    HENT: Negative.    Eyes: Negative.    Respiratory: Negative.    Cardiovascular: Negative.    Gastrointestinal: Negative.    Endocrine: Negative.    Genitourinary: Negative.    Musculoskeletal: Negative.    Allergic/Immunologic: Negative.    Neurological: Positive for headaches.   Hematological: Negative.    Psychiatric/Behavioral: Negative.      Objective:     Vital Signs (Most Recent):  Temp: 97.9 °F (36.6 °C) (07/04/19 1404)  Pulse: 103 (07/04/19 1651)  Resp: 19 (07/04/19 1651)  BP: 129/60 (07/04/19 1651)  SpO2: 97 % (07/04/19 1651) Vital Signs (24h Range):  Temp:  [97.9 °F (36.6 °C)-98.5 °F (36.9 °C)] 97.9 °F (36.6 °C)  Pulse:  [] 103  Resp:  [11-20] 19  SpO2:  [92 %-99 %] 97 %  BP: (123-185)/(55-85) 129/60        There is no height or weight on file to calculate BMI.    Physical Exam   Constitutional: She is oriented to person, place, and time. She appears well-developed and well-nourished.   HENT:   Head: Normocephalic.   Eyes: Pupils are equal, round, and reactive to light. Conjunctivae and EOM are normal.   Neck: Normal range of motion.   Cardiovascular: Normal rate and regular rhythm.   Pulmonary/Chest: Effort normal and breath sounds normal.   Abdominal: Soft.   Neurological: She is alert and oriented to person, place, and time. She displays normal reflexes. No cranial nerve deficit or sensory deficit. She exhibits normal muscle tone. She has a normal Finger-Nose-Finger Test. Coordination normal.   Reflex Scores:       Tricep reflexes are 2+ on the right side and 2+ on the left side.       Bicep reflexes are 2+ on the right side and  2+ on the left side.       Brachioradialis reflexes are 2+ on the right side and 2+ on the left side.       Patellar reflexes are 2+ on the right side and 2+ on the left side.       Achilles reflexes are 2+ on the right side and 2+ on the left side.  Skin: Skin is warm.   Psychiatric: She has a normal mood and affect. Her behavior is normal. Judgment and thought content normal.       NEUROLOGICAL EXAMINATION:     MENTAL STATUS   Oriented to person, place, and time.   Attention: normal. Concentration: normal.   Level of consciousness: alert  Knowledge: good.   Normal comprehension.     CRANIAL NERVES   Cranial nerves II through XII intact.     CN III, IV, VI   Pupils are equal, round, and reactive to light.  Extraocular motions are normal.     MOTOR EXAM   Muscle bulk: normal  Overall muscle tone: normal  Right arm tone: normal  Left arm tone: normal  Right arm pronator drift: absent  Left arm pronator drift: absent  Right leg tone: normal  Left leg tone: normal    Strength   Right neck flexion: 5/5  Left neck flexion: 5/5  Right neck extension: 5/5  Left neck extension: 5/5  Right deltoid: 5/5  Left deltoid: 5/5  Right biceps: 5/5  Left biceps: 5/5  Right triceps: 5/5  Left triceps: 5/5  Right wrist flexion: 5/5  Left wrist flexion: 5/5  Right wrist extension: 5/5  Left wrist extension: 5/5  Right interossei: 5/5  Left interossei: 5/5  Right abdominals: 5/5  Left abdominals: 5/5  Right iliopsoas: 5/5  Left iliopsoas: 5/5  Right quadriceps: 5/5  Left quadriceps: 5/5  Right hamstrin/5  Left hamstrin/5  Right glutei: 5/5  Left glutei: 5/5  Right anterior tibial: 5/5  Left anterior tibial: 5/5  Right posterior tibial: 5/5  Left posterior tibial: 5/5  Right peroneal: 5/5  Left peroneal: 5/5  Right gastroc: 5/5  Left gastroc: 5/5    REFLEXES     Reflexes   Right brachioradialis: 2+  Left brachioradialis: 2+  Right biceps: 2+  Left biceps: 2+  Right triceps: 2+  Left triceps: 2+  Right patellar: 2+  Left patellar:  2+  Right achilles: 2+  Left achilles: 2+  Right : 2+  Left : 2+    SENSORY EXAM   Light touch normal.     GAIT AND COORDINATION      Coordination   Finger to nose coordination: normal      Significant Labs: All pertinent lab results from the past 24 hours have been reviewed.    Significant Imaging: I have reviewed all pertinent imaging results/findings within the past 24 hours.  I have reviewed and interpreted all pertinent imaging results/findings within the past 24 hours.

## 2019-07-04 NOTE — ED NOTES
Patient identifiers verified and correct for Gissel Jamison. Pt transferred from [facility] on Cardene drip at 4mg/hr. Pt continued on Cardene to maintain SBP >140 per Dr. Clark verbal order. Cardene titrated to 5mg/hr for /72.      LOC: The patient is awake and alert; oriented x 3 and speaking appropriately. Pt reports 6/10 headache pain while still, 10/10 headache pain with any movement.  APPEARANCE: Patient resting comfortably, patient is clean and well groomed.  SKIN: warm and dry, normal skin turgor & moist mucus membranes, skin intact, no breakdown noted.  MUSCULOSKELETAL: Patient moving all extremities well, no obvious swelling or deformities noted.  RESPIRATORY: Airway is open and patent; respirations are spontaneous, normal effort and rate. Denies SOB.  CARDIAC: Patient has a normal rate, no peripheral edema noted, capillary refill < 3 seconds; No complaints of chest pain.   ABDOMEN: Soft and non tender to palpation, no distention noted. Pt reports nausea, last episode of emesis was Tuesday, denies diarrhea.    Pt placed on cardiac monitor, continuous pulse ox, cycling blood pressures. Side rails up x2, call bell in reach, bed in low position with brake engaged. Will continue to monitor.

## 2019-07-04 NOTE — CONSULTS
[facility]  Neurosurgery  Consult Note    Inpatient consult to Neurosurgery  Consult performed by: Sara Balderas PA-C  Consult ordered by: Mei Clark MD        Subjective:     Chief Complaint/Reason for Admission: SAH    History of Present Illness: 65 yo with hx of breast cancer presents to the ED with complaints of headache after a barium CT abdomen on Tuesday. Reports intractable headache which began 2 days ago. Associated n/v, neck pain. Denies visual changes, weakness, paresthesias, b/b dysfunction. No trauma, falls, or LOC. She does not take antiplatelet therapy or anticoagulants. Currently on a cardene drip started at the outside hospital. She states she is tired from the morphine given prior to transport. Family at bedside.         (Not in a hospital admission)    Review of patient's allergies indicates:   Allergen Reactions    Codeine Nausea And Vomiting    Pcn [penicillins] Nausea And Vomiting    Sulfa (sulfonamide antibiotics) Nausea And Vomiting    Sulfanilamide        Past Medical History:   Diagnosis Date    Asthma     Breast cancer     Fibromyalgia     PONV (postoperative nausea and vomiting)     for general anesthesia     Past Surgical History:   Procedure Laterality Date    Arm Surgery      torn tendon    BACK SURGERY      x2    HYSTERECTOMY      RENETTA    MASTECTOMY      with reconstruction left breast    OOPHORECTOMY      RADIOFREQUENCY THERMAL COAGULATION, NERVE, SPINAL, CERVICAL, POSTERIOR RAMUS, MEDIAL BRANCH Left 2/8/2019    Performed by [facility] at [facility]    Tonsillectomy      TONSILLECTOMY       Family History     None        Tobacco Use    Smoking status: Never Smoker    Smokeless tobacco: Never Used   Substance and Sexual Activity    Alcohol use: No    Drug use: No    Sexual activity: Yes     Partners: Male     Birth control/protection: Post-menopausal, Surgical     Review of Systems   Constitutional: no fever, chills or night  sweats. No changes in weight   Eyes: no visual changes   ENT: no nasal congestion or sore throat   Respiratory: no cough or shortness of breath   Cardiovascular: no chest pain or palpitations   Gastrointestinal: + nausea, vomiting   Genitourinary: no hematuria or dysuria   Integument/Breast: no rash or pruritis   Hematologic/Lymphatic: no easy bruising or lymphadenopathy   Musculoskeletal: no arthralgias or myalgias.   Neurological: no seizures or tremors, + headache  Behavioral/Psych: no auditory or visual hallucinations   Endocrine: no heat or cold intolerance       Objective:        There is no height or weight on file to calculate BMI.  Vital Signs (Most Recent):  Temp: 97.9 °F (36.6 °C) (07/04/19 1404)  Pulse: 98 (07/04/19 1451)  Resp: 12 (07/04/19 1451)  BP: (!) 140/64 (07/04/19 1451)  SpO2: 97 % (07/04/19 1451) Vital Signs (24h Range):  Temp:  [97.9 °F (36.6 °C)-98.5 °F (36.9 °C)] 97.9 °F (36.6 °C)  Pulse:  [] 98  Resp:  [12-20] 12  SpO2:  [92 %-98 %] 97 %  BP: (137-185)/(61-85) 140/64                     Neurosurgery Physical Exam    General: well developed, well nourished, no distress.   Head: normocephalic, atraumatic  Neurologic: Alert and oriented. Thought content appropriate.  GCS: Motor: 6/Verbal: 5/Eyes: 4 GCS Total: 15  Mental Status: Awake, Alert, Oriented x 4  Language: No aphasia  Speech: No dysarthria  Cranial nerves: face symmetric, tongue midline, CN II-XII grossly intact.   Eyes: pupils equal, round, reactive to light with accomodation, EOMI.  Pulmonary: normal respirations, no signs of respiratory distress  Abdomen: soft, non-distended, not tender to palpation  Sensory: intact to light touch throughout  Motor Strength: Moves all extremities spontaneously with good tone.  Full strength upper and lower extremities. No abnormal movements seen.   Pronator Drift: no drift noted  Finger-to-nose: slight right dysmetria  Skin: Skin is warm, dry and intact.            NIH Stroke Scale     1a.  Level of consciousness 0=alert; keenly responsive   1b. LOC questions 0=Answers both tasks correctly   1c. LOC commands 0=Answers both tasks correctly   2. Best gaze 0=normal   3. Visual 0=No visual loss   4.  Facial palsy 0=Normal symmetric movement   5.  Motor left arm 0=No drift, limb holds 90 (or 45) degrees for full 10 seconds   5b. Motor right arm 0=No drift, limb holds 90 (or 45) degrees for full 10 seconds   6a. Motor left leg 0=No drift, limb holds 90 (or 45) degrees for full 10 seconds   6b. Motor right leg 0=No drift, limb holds 90 (or 45) degrees for full 10 seconds   7. Limb ataxia 1=Present in one limb   8. Sensory 0=Normal; no sensory loss   9. Best language 0=No aphasia, normal   10. Dysarthria 0=Normal   11.  Extinction and inattention 0=No abnormality                                   TOTAL: 1             Significant Labs:  Recent Labs   Lab 07/04/19  1104   *      K 3.9      CO2 29   BUN 17   CREATININE 0.76   CALCIUM 9.7     Recent Labs   Lab 07/04/19  1104   WBC 7.08   HGB 12.7   HCT 41.4        Recent Labs   Lab 07/04/19  1104   INR 1.1   APTT 33.0*     Microbiology Results (last 7 days)     ** No results found for the last 168 hours. **        Recent Lab Results       07/04/19  1104        Albumin 4.3     Alkaline Phosphatase 103     ALT 19     Anion Gap 9     aPTT 33.0  Comment:  [facility] aPTT therapeutic range = 47-76 seconds     AST 20     Baso # 0.02     Basophil% 0.3     BILIRUBIN TOTAL 0.7  Comment:  For infants and newborns, interpretation of results should be based  on gestational age, weight and in agreement with clinical  observations.  Premature Infant recommended reference ranges:  Up to 24 hours.............<8.0 mg/dL  Up to 48 hours............<12.0 mg/dL  3-5 days..................<15.0 mg/dL  6-29 days.................<15.0 mg/dL       BUN, Bld 17     Calcium 9.7     Chloride 104     CO2 29     Creatinine 0.76     Differential Method  Automated     eGFR if African American >60.0     eGFR if non  >60.0  Comment:  Calculation used to obtain the estimated glomerular filtration  rate (eGFR) is the CKD-EPI equation.        Eos # 0.1     Eosinophil% 1.0     Glucose 126     Gran # (ANC) 4.3     Gran% 61.1     Hematocrit 41.4     Hemoglobin 12.7     Coumadin Monitoring INR 1.1  Comment:  Coumadin Therapy:  2.0 - 3.0 for INR for all indicators except mechanical heart valves  and antiphospholipid syndromes which should use 2.5 - 3.5.       Lymph # 2.0     Lymph% 28.1     MCH 26.9     MCHC 30.7     MCV 88     Mono # 0.7     Mono% 9.5     MPV 9.2     Platelets 286     Potassium 3.9     PROTEIN TOTAL 7.6     Protime 11.8     RBC 4.72     RDW 14.0     Sodium 142     WBC 7.08         All pertinent labs from the last 24 hours have been reviewed.    Significant Diagnostics:  All pertinent imaging reviewed.     Assessment/Plan:     SAH (subarachnoid hemorrhage)  63 yo with hx of breast cancer presents to the ED with complaints of intractable headache after a barium CT abdomen on 7/2. NSGY consulted for SAH found on CT head.    - Neurologically stable on initial assessment. HH2F2   - Stat CTA head reviewed. Plan for angiogram tomorrow.    - CT head @11am reviewed. Diffuse SAH within basal cisterns and midline falx. No evidence for hydrocephalus on this study. No mass effect or midline shift.   - NPO  - Coags wnl, aPTT slightly elevated. Not on antiplatelet therapy or anticoagulants. Repeat coags tomorrow.   - SBP < 140, on cardene drip  - Na > 135  - Keppra 500 BID x 7d  - Nimotop x 21d  - Daily TCDs x14d  - Maintain euvolemia  - Consult to Neurocritical care for ICU monitoring  - Will continue to monitor exam closely. Please page NSGY with any changes in clinical exam.           Thank you for your consult. I will follow-up with patient. Please contact us if you have any additional questions.    Sara Balderas PA-C  Neurosurgery  [facility]  [place]

## 2019-07-04 NOTE — ED TRIAGE NOTES
"Pt states "I went Tuesday and had a barium and when I went to lay down my ears filled with pressure. My head started to hurt so bad. Two days later my head still hurts and all the pressure still rushes to my head. I threw up for four hours when I got home. I have been checking my pressure and it hasn't gotten high." No meds today.   "

## 2019-07-04 NOTE — SUBJECTIVE & OBJECTIVE
(Not in a hospital admission)    Review of patient's allergies indicates:   Allergen Reactions    Codeine Nausea And Vomiting    Pcn [penicillins] Nausea And Vomiting    Sulfa (sulfonamide antibiotics) Nausea And Vomiting    Sulfanilamide        Past Medical History:   Diagnosis Date    Asthma     Breast cancer     Fibromyalgia     PONV (postoperative nausea and vomiting)     for general anesthesia     Past Surgical History:   Procedure Laterality Date    Arm Surgery      torn tendon    BACK SURGERY      x2    HYSTERECTOMY      RENETTA    MASTECTOMY      with reconstruction left breast    OOPHORECTOMY      RADIOFREQUENCY THERMAL COAGULATION, NERVE, SPINAL, CERVICAL, POSTERIOR RAMUS, MEDIAL BRANCH Left 2/8/2019    Performed by Raymond Britton MD at [facility] OR    Tonsillectomy      TONSILLECTOMY       Family History     None        Tobacco Use    Smoking status: Never Smoker    Smokeless tobacco: Never Used   Substance and Sexual Activity    Alcohol use: No    Drug use: No    Sexual activity: Yes     Partners: Male     Birth control/protection: Post-menopausal, Surgical     Review of Systems   Constitutional: no fever, chills or night sweats. No changes in weight   Eyes: no visual changes   ENT: no nasal congestion or sore throat   Respiratory: no cough or shortness of breath   Cardiovascular: no chest pain or palpitations   Gastrointestinal: + nausea, vomiting   Genitourinary: no hematuria or dysuria   Integument/Breast: no rash or pruritis   Hematologic/Lymphatic: no easy bruising or lymphadenopathy   Musculoskeletal: no arthralgias or myalgias.   Neurological: no seizures or tremors, + headache  Behavioral/Psych: no auditory or visual hallucinations   Endocrine: no heat or cold intolerance       Objective:        There is no height or weight on file to calculate BMI.  Vital Signs (Most Recent):  Temp: 97.9 °F (36.6 °C) (07/04/19 1404)  Pulse: 98 (07/04/19 1451)  Resp: 12 (07/04/19 1451)  BP:  (!) 140/64 (07/04/19 1451)  SpO2: 97 % (07/04/19 1451) Vital Signs (24h Range):  Temp:  [97.9 °F (36.6 °C)-98.5 °F (36.9 °C)] 97.9 °F (36.6 °C)  Pulse:  [] 98  Resp:  [12-20] 12  SpO2:  [92 %-98 %] 97 %  BP: (137-185)/(61-85) 140/64                     Neurosurgery Physical Exam    General: well developed, well nourished, no distress.   Head: normocephalic, atraumatic  Neurologic: Alert and oriented. Thought content appropriate.  GCS: Motor: 6/Verbal: 5/Eyes: 4 GCS Total: 15  Mental Status: Awake, Alert, Oriented x 4  Language: No aphasia  Speech: No dysarthria  Cranial nerves: face symmetric, tongue midline, CN II-XII grossly intact.   Eyes: pupils equal, round, reactive to light with accomodation, EOMI.  Pulmonary: normal respirations, no signs of respiratory distress  Abdomen: soft, non-distended, not tender to palpation  Sensory: intact to light touch throughout  Motor Strength: Moves all extremities spontaneously with good tone.  Full strength upper and lower extremities. No abnormal movements seen.   Pronator Drift: no drift noted  Finger-to-nose: slight right dysmetria  Skin: Skin is warm, dry and intact.            NIH Stroke Scale     1a. Level of consciousness 0=alert; keenly responsive   1b. LOC questions 0=Answers both tasks correctly   1c. LOC commands 0=Answers both tasks correctly   2. Best gaze 0=normal   3. Visual 0=No visual loss   4.  Facial palsy 0=Normal symmetric movement   5.  Motor left arm 0=No drift, limb holds 90 (or 45) degrees for full 10 seconds   5b. Motor right arm 0=No drift, limb holds 90 (or 45) degrees for full 10 seconds   6a. Motor left leg 0=No drift, limb holds 90 (or 45) degrees for full 10 seconds   6b. Motor right leg 0=No drift, limb holds 90 (or 45) degrees for full 10 seconds   7. Limb ataxia 1=Present in one limb   8. Sensory 0=Normal; no sensory loss   9. Best language 0=No aphasia, normal   10. Dysarthria 0=Normal   11.  Extinction and inattention 0=No  abnormality                                   TOTAL: 1             Significant Labs:  Recent Labs   Lab 07/04/19  1104   *      K 3.9      CO2 29   BUN 17   CREATININE 0.76   CALCIUM 9.7     Recent Labs   Lab 07/04/19  1104   WBC 7.08   HGB 12.7   HCT 41.4        Recent Labs   Lab 07/04/19  1104   INR 1.1   APTT 33.0*     Microbiology Results (last 7 days)     ** No results found for the last 168 hours. **        Recent Lab Results       07/04/19  1104        Albumin 4.3     Alkaline Phosphatase 103     ALT 19     Anion Gap 9     aPTT 33.0  Comment:  [facility] aPTT therapeutic range = 47-76 seconds     AST 20     Baso # 0.02     Basophil% 0.3     BILIRUBIN TOTAL 0.7  Comment:  For infants and newborns, interpretation of results should be based  on gestational age, weight and in agreement with clinical  observations.  Premature Infant recommended reference ranges:  Up to 24 hours.............<8.0 mg/dL  Up to 48 hours............<12.0 mg/dL  3-5 days..................<15.0 mg/dL  6-29 days.................<15.0 mg/dL       BUN, Bld 17     Calcium 9.7     Chloride 104     CO2 29     Creatinine 0.76     Differential Method Automated     eGFR if African American >60.0     eGFR if non  >60.0  Comment:  Calculation used to obtain the estimated glomerular filtration  rate (eGFR) is the CKD-EPI equation.        Eos # 0.1     Eosinophil% 1.0     Glucose 126     Gran # (ANC) 4.3     Gran% 61.1     Hematocrit 41.4     Hemoglobin 12.7     Coumadin Monitoring INR 1.1  Comment:  Coumadin Therapy:  2.0 - 3.0 for INR for all indicators except mechanical heart valves  and antiphospholipid syndromes which should use 2.5 - 3.5.       Lymph # 2.0     Lymph% 28.1     MCH 26.9     MCHC 30.7     MCV 88     Mono # 0.7     Mono% 9.5     MPV 9.2     Platelets 286     Potassium 3.9     PROTEIN TOTAL 7.6     Protime 11.8     RBC 4.72     RDW 14.0     Sodium 142     WBC 7.08         All  pertinent labs from the last 24 hours have been reviewed.    Significant Diagnostics:  All pertinent imaging reviewed.

## 2019-07-04 NOTE — ED NOTES
Pat from the [facility] returning call, pt will be transferred ED to ED transport. Same MD will be accepting pt. Pat will set up transport.Report can be called to [phone].

## 2019-07-04 NOTE — PLAN OF CARE
Problem: Adult Inpatient Plan of Care  Goal: Plan of Care Review  Outcome: Ongoing (interventions implemented as appropriate)  POC reviewed with pt and pt's family at 1800. Pt and pt's family verbalized understanding of the POC. Pt will have angio tomorrow. On cardene gtt for SBP < 140. Awaiting for other orders from NCC. IZABEL done. Questions and concerns addressed. Pt progressing towards goals of care. Please see flow sheet for detailed nursing assessment and VS. Will continue to monitor.

## 2019-07-04 NOTE — ED PROVIDER NOTES
"Encounter Date: 7/4/2019    SCRIBE #1 NOTE: I, Mak Nuñez, am scribing for, and in the presence of,  Dr. Clark. I have scribed the following portions of the note - Other sections scribed: HPI ROS PE.   SCRIBE #2 NOTE: I, Jace Aguirre, am scribing for, and in the presence of,  Dr. Clark. I have scribed the remaining portions of the note not scribed by Scribe #1.     History     Chief Complaint   Patient presents with    SAH Transfer     [facility] SAH      64 y.o. female with history of asthma, breast cancer, fibromyalgia presenting as transfer from [facility] for headache. Patient states that 2 days ago, after drinking barium for a CT study, she began to feel nauseated. When she was being placed onto the CT board, she began to have a sudden onset of a severe/pressure headache. At that time, she described her headache as if it was going to "explode" and when she told the the tech that she had a headache they said that it was due to "negative energy" in the room. She states that she had difficulty walking back to her car and when she was there she was crying due to the pain. She notes that the headache continues the following day and reports no relief with Aleve and Advil. She reports of associated blurry vision, nausea, and decreased appetite. She notes that her headache worsen with movement and is mildly alleviated when lying still. She was seen at [facility] today where she was found to have a SAH and is here for Neurosurgery evaluation. Currently, her headache persists and is still nauseated even after given zofran by EMS 10 mins PTA. No family history of aneurysm.         The history is provided by the patient and medical records.     Review of patient's allergies indicates:   Allergen Reactions    Codeine Nausea And Vomiting    Pcn [penicillins] Nausea And Vomiting    Sulfa (sulfonamide antibiotics) Nausea And Vomiting    Sulfanilamide      Past Medical History:   Diagnosis Date    " Asthma     Breast cancer     Fibromyalgia     PONV (postoperative nausea and vomiting)     for general anesthesia     Past Surgical History:   Procedure Laterality Date    Arm Surgery      torn tendon    BACK SURGERY      x2    HYSTERECTOMY      RENETTA    MASTECTOMY      with reconstruction left breast    OOPHORECTOMY      RADIOFREQUENCY THERMAL COAGULATION, NERVE, SPINAL, CERVICAL, POSTERIOR RAMUS, MEDIAL BRANCH Left 2/8/2019    [facility]    Tonsillectomy      TONSILLECTOMY       Family History   Problem Relation Age of Onset    Breast cancer Neg Hx     Ovarian cancer Neg Hx      Social History     Tobacco Use    Smoking status: Never Smoker    Smokeless tobacco: Never Used   Substance Use Topics    Alcohol use: No    Drug use: No     Review of Systems   Constitutional: Positive for appetite change (decreased).   HENT: Negative for hearing loss.    Eyes: Positive for visual disturbance (blurry vision).   Respiratory: Negative for shortness of breath.    Gastrointestinal: Positive for nausea.   Genitourinary: Negative for dysuria.   Musculoskeletal: Negative for back pain.   Skin: Negative for color change.   Neurological: Positive for headaches.       Physical Exam     Initial Vitals [07/04/19 1404]   BP Pulse Resp Temp SpO2   (!) 146/72 94 18 97.9 °F (36.6 °C) 95 %      MAP       --         Physical Exam    Nursing note and vitals reviewed.  Constitutional: She appears well-developed and well-nourished. No distress.   HENT:   Head: Normocephalic and atraumatic.   Eyes: EOM are normal. Pupils are equal, round, and reactive to light.   Neck: Normal range of motion.   Cardiovascular: Normal rate, regular rhythm and normal heart sounds.   Pulmonary/Chest: Breath sounds normal. No respiratory distress.   Abdominal: Soft. There is no tenderness.   Musculoskeletal: Normal range of motion. She exhibits no edema.   Moving all extremities    Neurological: She is alert and  oriented to person, place, and time. She has normal strength. No cranial nerve deficit or sensory deficit.   Skin: Skin is warm and dry.   Psychiatric: She has a normal mood and affect.         ED Course   Procedures  Labs Reviewed - No data to display       Imaging Results           CTA Head (Final result)  Result time 07/04/19 16:43:12    Final result by Luzmaria Brito MD (07/04/19 16:43:12)                 Impression:      Small anterior communicating artery aneurysm likely the origin of the recent subarachnoid bleed.    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    This report was flagged in Epic as abnormal.      Electronically signed by: Luzmaria Brito MD  Date:    07/04/2019  Time:    16:43             Narrative:    EXAMINATION:  CTA HEAD    CLINICAL HISTORY:  SAH, proven, follow-up;    TECHNIQUE:  Non contrast low dose axial images were obtained thought the head. CT angiogram was performed from the level of the bottom of C2 to the top of the head following the IV administration of 100mL of Omnipaque 350.   Sagittal and coronal reconstructions and maximum intensity projection reconstructions were performed.    COMPARISON:  CT 07/04/2019    FINDINGS:  Subtle area of hyperattenuation noted in the suprasellar cistern and several sulci of the frontal lobe and left perisylvian region.  The ventricular system is normal in size.  No definite acute or sizable remote infarction.  No  subdural fluid collection.    There is a small aneurysm arising from the anterior communicating artery measuring 3 x 2 mm extending superiorly and anteriorly.  The bilateral ACAs are patent.  The right A1 segment is hypoplastic likely a congenital variant.  The bilateral MCAs appear normal.  The bilateral distal vertebral artery and basilar artery  are normal.  The bilateral PCAs are normal.  No arteriovenous malformation seen.    The skull appears intact.  The para nasal sinuses and mastoid air cells visualized  are normal.  The bilateral distal internal carotid artery cavernous portion demonstrate mild atherosclerotic plaque, no aneurysmal dilation.                                 Medical Decision Making:   History:   Old Medical Records: I decided to obtain old medical records.  Initial Assessment:   Pt presented from [facility] with a SAH seen on CT head, she was awake and alert, neuro intact complaining of nausea  Neurosurgery consulted on arrival to the ED and the neurocritical fellow was alerted as well  Pt was on a cardene drip upon arrival, will continue to monitor BP and titrate as needed  Pt admitted to the neuro ICU    Clinical Tests:   Radiological Study: Ordered and Reviewed  Other:   I have discussed this case with another health care provider.       <> Summary of the Discussion: Neurosurgery             Scribe Attestation:   Scribe #1: I performed the above scribed service and the documentation accurately describes the services I performed. I attest to the accuracy of the note.  Scribe #2: I performed the above scribed service and the documentation accurately describes the services I performed. I attest to the accuracy of the note.    Attending Attestation:         Attending Critical Care:   Critical Care Times:   ==============================================================  · Total Critical Care Time - exclusive of procedural time: 35 minutes.  ==============================================================  Critical Care Condition: potentially life-threatening                  Clinical Impression:       ICD-10-CM ICD-9-CM   1. SAH (subarachnoid hemorrhage) I60.9 430   2. Aneurysm of anterior communicating artery I67.1 437.3   3. Seizure prophylaxis Z29.8 V07.8         Disposition:   Disposition: Admitted                        Mei Clark MD  07/05/19 1142       Mei Clark MD  07/05/19 1144

## 2019-07-04 NOTE — PROGRESS NOTES
Patient arrived to [facility] from [facility] in [facility] to [facility]   Type of stroke/diagnosis:  SAH    TPA start and end time (if applicable) n/a    Thrombectomy start and end time (if applicable) n/a    Current symptoms:  Bounding headache    Skin assessment done: Yes  Wounds noted: none     NCC notified: Doris Romero NP

## 2019-07-05 ENCOUNTER — ANESTHESIA (OUTPATIENT)
Dept: ENDOSCOPY | Facility: HOSPITAL | Age: 64
DRG: 020 | End: 2019-07-05
Payer: COMMERCIAL

## 2019-07-05 ENCOUNTER — ANESTHESIA EVENT (OUTPATIENT)
Dept: ENDOSCOPY | Facility: HOSPITAL | Age: 64
DRG: 020 | End: 2019-07-05
Payer: COMMERCIAL

## 2019-07-05 PROBLEM — I60.2: Status: ACTIVE | Noted: 2019-07-04

## 2019-07-05 LAB
ALBUMIN SERPL BCP-MCNC: 3.8 G/DL (ref 3.5–5.2)
ALP SERPL-CCNC: 100 U/L (ref 55–135)
ALT SERPL W/O P-5'-P-CCNC: 12 U/L (ref 10–44)
ANION GAP SERPL CALC-SCNC: 13 MMOL/L (ref 8–16)
AST SERPL-CCNC: 18 U/L (ref 10–40)
BASOPHILS # BLD AUTO: 0.03 K/UL (ref 0–0.2)
BASOPHILS NFR BLD: 0.3 % (ref 0–1.9)
BILIRUB SERPL-MCNC: 0.5 MG/DL (ref 0.1–1)
BUN SERPL-MCNC: 11 MG/DL (ref 8–23)
CALCIUM SERPL-MCNC: 9.2 MG/DL (ref 8.7–10.5)
CHLORIDE SERPL-SCNC: 109 MMOL/L (ref 95–110)
CHOLEST SERPL-MCNC: 256 MG/DL (ref 120–199)
CHOLEST/HDLC SERPL: 4.7 {RATIO} (ref 2–5)
CO2 SERPL-SCNC: 16 MMOL/L (ref 23–29)
CREAT SERPL-MCNC: 0.7 MG/DL (ref 0.5–1.4)
DIFFERENTIAL METHOD: ABNORMAL
EOSINOPHIL # BLD AUTO: 0 K/UL (ref 0–0.5)
EOSINOPHIL NFR BLD: 0.2 % (ref 0–8)
ERYTHROCYTE [DISTWIDTH] IN BLOOD BY AUTOMATED COUNT: 13.3 % (ref 11.5–14.5)
EST. GFR  (AFRICAN AMERICAN): >60 ML/MIN/1.73 M^2
EST. GFR  (NON AFRICAN AMERICAN): >60 ML/MIN/1.73 M^2
ESTIMATED AVG GLUCOSE: 123 MG/DL (ref 68–131)
GLUCOSE SERPL-MCNC: 103 MG/DL (ref 70–110)
HBA1C MFR BLD HPLC: 5.9 % (ref 4–5.6)
HCT VFR BLD AUTO: 40.1 % (ref 37–48.5)
HDLC SERPL-MCNC: 55 MG/DL (ref 40–75)
HDLC SERPL: 21.5 % (ref 20–50)
HGB BLD-MCNC: 12 G/DL (ref 12–16)
IMM GRANULOCYTES # BLD AUTO: 0.03 K/UL (ref 0–0.04)
IMM GRANULOCYTES NFR BLD AUTO: 0.3 % (ref 0–0.5)
LDLC SERPL CALC-MCNC: 179.2 MG/DL (ref 63–159)
LYMPHOCYTES # BLD AUTO: 2.3 K/UL (ref 1–4.8)
LYMPHOCYTES NFR BLD: 25.4 % (ref 18–48)
MAGNESIUM SERPL-MCNC: 2.1 MG/DL (ref 1.6–2.6)
MCH RBC QN AUTO: 26.8 PG (ref 27–31)
MCHC RBC AUTO-ENTMCNC: 29.9 G/DL (ref 32–36)
MCV RBC AUTO: 90 FL (ref 82–98)
MONOCYTES # BLD AUTO: 0.6 K/UL (ref 0.3–1)
MONOCYTES NFR BLD: 6.3 % (ref 4–15)
NEUTROPHILS # BLD AUTO: 6 K/UL (ref 1.8–7.7)
NEUTROPHILS NFR BLD: 67.5 % (ref 38–73)
NONHDLC SERPL-MCNC: 201 MG/DL
NRBC BLD-RTO: 0 /100 WBC
PHOSPHATE SERPL-MCNC: 3.9 MG/DL (ref 2.7–4.5)
PLATELET # BLD AUTO: 212 K/UL (ref 150–350)
PMV BLD AUTO: 9.2 FL (ref 9.2–12.9)
POTASSIUM SERPL-SCNC: 4.5 MMOL/L (ref 3.5–5.1)
PROT SERPL-MCNC: 7.1 G/DL (ref 6–8.4)
RBC # BLD AUTO: 4.47 M/UL (ref 4–5.4)
SODIUM SERPL-SCNC: 138 MMOL/L (ref 136–145)
TRIGL SERPL-MCNC: 109 MG/DL (ref 30–150)
WBC # BLD AUTO: 8.91 K/UL (ref 3.9–12.7)

## 2019-07-05 PROCEDURE — 25000003 PHARM REV CODE 250: Performed by: STUDENT IN AN ORGANIZED HEALTH CARE EDUCATION/TRAINING PROGRAM

## 2019-07-05 PROCEDURE — 83036 HEMOGLOBIN GLYCOSYLATED A1C: CPT

## 2019-07-05 PROCEDURE — 80053 COMPREHEN METABOLIC PANEL: CPT

## 2019-07-05 PROCEDURE — D9220A PRA ANESTHESIA: ICD-10-PCS | Mod: CRNA,,, | Performed by: NURSE ANESTHETIST, CERTIFIED REGISTERED

## 2019-07-05 PROCEDURE — 83735 ASSAY OF MAGNESIUM: CPT

## 2019-07-05 PROCEDURE — D9220A PRA ANESTHESIA: Mod: CRNA,,, | Performed by: NURSE ANESTHETIST, CERTIFIED REGISTERED

## 2019-07-05 PROCEDURE — 63600175 PHARM REV CODE 636 W HCPCS: Performed by: NURSE ANESTHETIST, CERTIFIED REGISTERED

## 2019-07-05 PROCEDURE — 99223 PR INITIAL HOSPITAL CARE,LEVL III: ICD-10-PCS | Mod: ,,, | Performed by: PSYCHIATRY & NEUROLOGY

## 2019-07-05 PROCEDURE — 99233 SBSQ HOSP IP/OBS HIGH 50: CPT | Mod: ,,, | Performed by: PSYCHIATRY & NEUROLOGY

## 2019-07-05 PROCEDURE — 25000003 PHARM REV CODE 250: Performed by: NURSE ANESTHETIST, CERTIFIED REGISTERED

## 2019-07-05 PROCEDURE — 80061 LIPID PANEL: CPT

## 2019-07-05 PROCEDURE — 20000000 HC ICU ROOM

## 2019-07-05 PROCEDURE — 37000009 HC ANESTHESIA EA ADD 15 MINS: Performed by: NEUROLOGICAL SURGERY

## 2019-07-05 PROCEDURE — 25000003 PHARM REV CODE 250: Performed by: EMERGENCY MEDICINE

## 2019-07-05 PROCEDURE — 27200677 HC TRANSDUCER MONITOR KIT SINGLE: Performed by: NURSE ANESTHETIST, CERTIFIED REGISTERED

## 2019-07-05 PROCEDURE — 63600175 PHARM REV CODE 636 W HCPCS: Performed by: PSYCHIATRY & NEUROLOGY

## 2019-07-05 PROCEDURE — D9220A PRA ANESTHESIA: ICD-10-PCS | Mod: ANES,,, | Performed by: ANESTHESIOLOGY

## 2019-07-05 PROCEDURE — D9220A PRA ANESTHESIA: Mod: ANES,,, | Performed by: ANESTHESIOLOGY

## 2019-07-05 PROCEDURE — 27201037 HC PRESSURE MONITORING SET UP

## 2019-07-05 PROCEDURE — 84100 ASSAY OF PHOSPHORUS: CPT

## 2019-07-05 PROCEDURE — 99223 1ST HOSP IP/OBS HIGH 75: CPT | Mod: ,,, | Performed by: PSYCHIATRY & NEUROLOGY

## 2019-07-05 PROCEDURE — 25500020 PHARM REV CODE 255: Performed by: PSYCHIATRY & NEUROLOGY

## 2019-07-05 PROCEDURE — 27100021 HC MULTIPORT INFUSION MANIFOLD: Performed by: NURSE ANESTHETIST, CERTIFIED REGISTERED

## 2019-07-05 PROCEDURE — 94761 N-INVAS EAR/PLS OXIMETRY MLT: CPT

## 2019-07-05 PROCEDURE — C1751 CATH, INF, PER/CENT/MIDLINE: HCPCS | Performed by: NURSE ANESTHETIST, CERTIFIED REGISTERED

## 2019-07-05 PROCEDURE — 99233 PR SUBSEQUENT HOSPITAL CARE,LEVL III: ICD-10-PCS | Mod: ,,, | Performed by: PSYCHIATRY & NEUROLOGY

## 2019-07-05 PROCEDURE — 85025 COMPLETE CBC W/AUTO DIFF WBC: CPT

## 2019-07-05 PROCEDURE — 37000008 HC ANESTHESIA 1ST 15 MINUTES: Performed by: NEUROLOGICAL SURGERY

## 2019-07-05 RX ORDER — NICARDIPINE HYDROCHLORIDE 2.5 MG/ML
INJECTION INTRAVENOUS
Status: DISCONTINUED | OUTPATIENT
Start: 2019-07-05 | End: 2019-07-05

## 2019-07-05 RX ORDER — LIDOCAINE HCL/PF 100 MG/5ML
SYRINGE (ML) INTRAVENOUS
Status: DISCONTINUED | OUTPATIENT
Start: 2019-07-05 | End: 2019-07-05

## 2019-07-05 RX ORDER — NEOSTIGMINE METHYLSULFATE 1 MG/ML
INJECTION, SOLUTION INTRAVENOUS
Status: DISCONTINUED | OUTPATIENT
Start: 2019-07-05 | End: 2019-07-05

## 2019-07-05 RX ORDER — SODIUM CHLORIDE 9 MG/ML
INJECTION, SOLUTION INTRAVENOUS CONTINUOUS PRN
Status: DISCONTINUED | OUTPATIENT
Start: 2019-07-05 | End: 2019-07-05

## 2019-07-05 RX ORDER — ONDANSETRON 2 MG/ML
INJECTION INTRAMUSCULAR; INTRAVENOUS
Status: DISCONTINUED | OUTPATIENT
Start: 2019-07-05 | End: 2019-07-05

## 2019-07-05 RX ORDER — IODIXANOL 320 MG/ML
450 INJECTION, SOLUTION INTRAVASCULAR
Status: COMPLETED | OUTPATIENT
Start: 2019-07-05 | End: 2019-07-05

## 2019-07-05 RX ORDER — NIMODIPINE 30 MG/1
60 CAPSULE, LIQUID FILLED ORAL EVERY 4 HOURS
Status: DISCONTINUED | OUTPATIENT
Start: 2019-07-05 | End: 2019-07-18 | Stop reason: HOSPADM

## 2019-07-05 RX ORDER — EPTIFIBATIDE 0.75 MG/ML
2 INJECTION, SOLUTION INTRAVENOUS ONCE
Status: DISCONTINUED | OUTPATIENT
Start: 2019-07-05 | End: 2019-07-06

## 2019-07-05 RX ORDER — FENTANYL CITRATE 50 UG/ML
INJECTION, SOLUTION INTRAMUSCULAR; INTRAVENOUS
Status: DISCONTINUED | OUTPATIENT
Start: 2019-07-05 | End: 2019-07-05

## 2019-07-05 RX ORDER — PROPOFOL 10 MG/ML
VIAL (ML) INTRAVENOUS
Status: DISCONTINUED | OUTPATIENT
Start: 2019-07-05 | End: 2019-07-05

## 2019-07-05 RX ORDER — SODIUM CHLORIDE 0.9 % (FLUSH) 0.9 %
10 SYRINGE (ML) INJECTION
Status: DISCONTINUED | OUTPATIENT
Start: 2019-07-05 | End: 2019-07-18 | Stop reason: HOSPADM

## 2019-07-05 RX ORDER — ONDANSETRON 2 MG/ML
4 INJECTION INTRAMUSCULAR; INTRAVENOUS ONCE AS NEEDED
Status: CANCELLED | OUTPATIENT
Start: 2019-07-05 | End: 2030-12-01

## 2019-07-05 RX ORDER — PHENYLEPHRINE HYDROCHLORIDE 10 MG/ML
INJECTION INTRAVENOUS
Status: DISCONTINUED | OUTPATIENT
Start: 2019-07-05 | End: 2019-07-05

## 2019-07-05 RX ORDER — SODIUM CHLORIDE 0.9 % (FLUSH) 0.9 %
3 SYRINGE (ML) INJECTION
Status: CANCELLED | OUTPATIENT
Start: 2019-07-05

## 2019-07-05 RX ORDER — DEXAMETHASONE SODIUM PHOSPHATE 4 MG/ML
INJECTION, SOLUTION INTRA-ARTICULAR; INTRALESIONAL; INTRAMUSCULAR; INTRAVENOUS; SOFT TISSUE
Status: DISCONTINUED | OUTPATIENT
Start: 2019-07-05 | End: 2019-07-05

## 2019-07-05 RX ORDER — NICARDIPINE HYDROCHLORIDE 0.2 MG/ML
INJECTION INTRAVENOUS
Status: COMPLETED
Start: 2019-07-05 | End: 2019-07-05

## 2019-07-05 RX ORDER — ROCURONIUM BROMIDE 10 MG/ML
INJECTION, SOLUTION INTRAVENOUS
Status: DISCONTINUED | OUTPATIENT
Start: 2019-07-05 | End: 2019-07-05

## 2019-07-05 RX ORDER — GLYCOPYRROLATE 0.2 MG/ML
INJECTION INTRAMUSCULAR; INTRAVENOUS
Status: DISCONTINUED | OUTPATIENT
Start: 2019-07-05 | End: 2019-07-05

## 2019-07-05 RX ORDER — NICARDIPINE HYDROCHLORIDE 0.2 MG/ML
INJECTION INTRAVENOUS CONTINUOUS PRN
Status: DISCONTINUED | OUTPATIENT
Start: 2019-07-05 | End: 2019-07-05

## 2019-07-05 RX ORDER — MIDAZOLAM HYDROCHLORIDE 1 MG/ML
INJECTION, SOLUTION INTRAMUSCULAR; INTRAVENOUS
Status: DISCONTINUED | OUTPATIENT
Start: 2019-07-05 | End: 2019-07-05

## 2019-07-05 RX ADMIN — FENTANYL CITRATE 100 MCG: 50 INJECTION, SOLUTION INTRAMUSCULAR; INTRAVENOUS at 11:07

## 2019-07-05 RX ADMIN — ROCURONIUM BROMIDE 10 MG: 10 INJECTION, SOLUTION INTRAVENOUS at 12:07

## 2019-07-05 RX ADMIN — MIDAZOLAM HYDROCHLORIDE 2 MG: 1 INJECTION, SOLUTION INTRAMUSCULAR; INTRAVENOUS at 11:07

## 2019-07-05 RX ADMIN — PHENYLEPHRINE HYDROCHLORIDE 100 MCG: 10 INJECTION INTRAVENOUS at 11:07

## 2019-07-05 RX ADMIN — DEXAMETHASONE SODIUM PHOSPHATE 8 MG: 4 INJECTION, SOLUTION INTRAMUSCULAR; INTRAVENOUS at 11:07

## 2019-07-05 RX ADMIN — SODIUM CHLORIDE 0.25 MCG/KG/MIN: 9 INJECTION, SOLUTION INTRAVENOUS at 11:07

## 2019-07-05 RX ADMIN — LIDOCAINE HYDROCHLORIDE 60 MG: 20 INJECTION, SOLUTION INTRAVENOUS at 11:07

## 2019-07-05 RX ADMIN — NICARDIPINE HYDROCHLORIDE 5 MG/HR: 0.2 INJECTION, SOLUTION INTRAVENOUS at 01:07

## 2019-07-05 RX ADMIN — PHENYLEPHRINE HYDROCHLORIDE 50 MCG: 10 INJECTION INTRAVENOUS at 11:07

## 2019-07-05 RX ADMIN — ROCURONIUM BROMIDE 20 MG: 10 INJECTION, SOLUTION INTRAVENOUS at 12:07

## 2019-07-05 RX ADMIN — ONDANSETRON 4 MG: 2 INJECTION INTRAMUSCULAR; INTRAVENOUS at 01:07

## 2019-07-05 RX ADMIN — IODIXANOL 150 ML: 320 INJECTION, SOLUTION INTRAVASCULAR at 01:07

## 2019-07-05 RX ADMIN — SODIUM CHLORIDE, SODIUM GLUCONATE, SODIUM ACETATE, POTASSIUM CHLORIDE, MAGNESIUM CHLORIDE, SODIUM PHOSPHATE, DIBASIC, AND POTASSIUM PHOSPHATE: .53; .5; .37; .037; .03; .012; .00082 INJECTION, SOLUTION INTRAVENOUS at 11:07

## 2019-07-05 RX ADMIN — SODIUM CHLORIDE: 0.9 INJECTION, SOLUTION INTRAVENOUS at 01:07

## 2019-07-05 RX ADMIN — NIMODIPINE 60 MG: 30 CAPSULE, LIQUID FILLED ORAL at 09:07

## 2019-07-05 RX ADMIN — ROCURONIUM BROMIDE 40 MG: 10 INJECTION, SOLUTION INTRAVENOUS at 11:07

## 2019-07-05 RX ADMIN — GLYCOPYRROLATE 0.6 MG: 0.2 INJECTION, SOLUTION INTRAMUSCULAR; INTRAVENOUS at 01:07

## 2019-07-05 RX ADMIN — NIMODIPINE 60 MG: 30 CAPSULE, LIQUID FILLED ORAL at 06:07

## 2019-07-05 RX ADMIN — ONDANSETRON 4 MG: 2 INJECTION INTRAMUSCULAR; INTRAVENOUS at 07:07

## 2019-07-05 RX ADMIN — NEOSTIGMINE METHYLSULFATE 5 MG: 1 INJECTION INTRAVENOUS at 01:07

## 2019-07-05 RX ADMIN — PROPOFOL 150 MG: 10 INJECTION, EMULSION INTRAVENOUS at 11:07

## 2019-07-05 RX ADMIN — NICARDIPINE HYDROCHLORIDE 2.5 MG/HR: 0.2 INJECTION, SOLUTION INTRAVENOUS at 08:07

## 2019-07-05 RX ADMIN — NICARDIPINE HYDROCHLORIDE 0.2 MG: 25 INJECTION INTRAVENOUS at 01:07

## 2019-07-05 NOTE — PROGRESS NOTES
Pt arrived  for cerebral angiogram with intervention. Labs, orders and consents reviewed. MD notified.

## 2019-07-05 NOTE — PLAN OF CARE
Problem: Adult Inpatient Plan of Care  Goal: Plan of Care Review  Outcome: Ongoing (interventions implemented as appropriate)  POC reviewed with pt at 0500. Pt and  verbalized understanding. Questions and concerns addressed. No acute events overnight. Pt on cardene gtt most of night to keep SBP less than 140. Plans for angio today. Pt NPO since midnight. Pt progressing toward goals. Will continue to monitor. See flowsheets for full assessment and VS info

## 2019-07-05 NOTE — CONSULTS
Consulted due to Acomm aneurysm SAH.  Plan for cerebral angiogram with possible intervention.    Sedation: anesthesia    Nathanael Lopez MD  Vascular and Interventional Neurology Staff  Director of [facility]  [phone]

## 2019-07-05 NOTE — HOSPITAL COURSE
07/05/2019 NAEO. For conventional angiogram today ACOM aneurysm on CTA  7/7/19: add lovenox for DVT proph., initiate IVF, PT/OT, liberalize BP <200  07/08/2019 headache. Mild to moderate cerebral VSP.   07/09/2019 NAEON  7/10/2019: increased night time gabapentin, add senna doc, CT head today (ordered per NSGY)-stable, continue TCDs and nimotop, dexamethasone 4 mg once PRN (for headache).   07/12/2019: severe headache overnight, resolved with steroids  7/14: stepped up to neuro ICU, concern for vasospasm. TCDs, bolus of fluids given  07/15/2019: stable exam, headache controlled with steroids  07/16/2019: no acute adverse events overnight  7:18, no acute events overnight. Patient to be discharged home today

## 2019-07-05 NOTE — CONSULTS
[facility]  Vascular Neurology  Comprehensive Stroke Center  Consult Note    Consults  Assessment/Plan:     Patient is a 64 y.o. year old female with:    SAH (subarachnoid hemorrhage)  63 yo with PMH significant for breast cancer s/p L mastectomy, admitted with grade II HH aneurysmatic SAH due to likely rupture small anterior communicating artery aneurysm.  Non focal neuro-exam.   Plan for angio and coiling today.  Nimodipine, keppra, euvolemia, nicardipine iv SBP<140, daily TCD.  Will continue to follow.          STROKE DOCUMENTATION     Acute Stroke Times   Last Known Normal Date: 07/03/19  Last Known Normal Time: (unable to determine)  Symptom Onset Date: 07/03/19  Symptom Onset Time: (unclear)  Stroke Team Called Date: 07/05/19  Stroke Team Called Time: 0820  Stroke Team Arrival Date: 07/05/19  Stroke Team Arrival Time: 0825  CT Interpretation Time: 0825  Decision to Treat Time for Alteplase: (No iv alteplase candidate)  Decision to Treat Time for IR: 0825    NIH Scale:  Interval: baseline  1a. Level of Consciousness: 0-->Alert, keenly responsive  1b. LOC Questions: 0-->Answers both questions correctly  1c. LOC Commands: 0-->Performs both tasks correctly  2. Best Gaze: 0-->Normal  3. Visual: 0-->No visual loss  4. Facial Palsy: 0-->Normal symmetrical movements  5a. Motor Arm, Left: 0-->No drift, limb holds 90 (or 45) degrees for full 10 secs  5b. Motor Arm, Right: 0-->No drift, limb holds 90 (or 45) degrees for full 10 secs  6a. Motor Leg, Left: 0-->No drift, leg holds 30 degree position for full 5 secs  6b. Motor Leg, Right: 0-->No drift, leg holds 30 degree position for full 5 secs  7. Limb Ataxia: 0-->Absent  8. Sensory: 0-->Normal, no sensory loss  9. Best Language: 0-->No aphasia, normal  10. Dysarthria: 0-->Normal  11. Extinction and Inattention (formerly Neglect): 0-->No abnormality  Total (NIH Stroke Scale): 0    Modified Mohsen Score: 0  [place] Coma Scale:15   ABCD2 Score:   "  [id number] Score:   HAS -BLED Score:   ICH Score:0  Hunt & Rucker Classification:Grade II      Thrombolysis Candidate? No, SAH    Delays to Thrombolysis?  No    Interventional Revascularization Candidate?   Is the patient eligible for mechanical endovascular reperfusion (CORI)?  No; ICH/ SAH       Hemorrhagic change of an Ischemic Stroke: Does this patient have an ischemic stroke with hemorrhagic changes? No     Subjective:     History of Present Illness:    Mrs Jamison is a 65 yo with PMH significant for breast cancer s/p L mastectomy, who was in her usual state of health until 2 days ago when she began experiencing severe HA associated with n/v, and neck pain. Stated that the HA developed immediately after she was placed on the table to get a barium CT abdomen on Tuesday.   She denies associated vertigo, visual disturbances, focal weakness or numbness, slurred speech, or language impairment.  No recent falls or head trauma. She does not take antiplatelet therapy or anticoagulants.  A CTH done upon arrival to the ED yesterday demonstrated " diffuse subarachnoid hemorrhage within the basal cisterns as well as in the midline falx". CTA brain showed a small anterior communicating aneurysm.          Past Medical History:   Diagnosis Date    Asthma     Breast cancer     Fibromyalgia     PONV (postoperative nausea and vomiting)     for general anesthesia     Past Surgical History:   Procedure Laterality Date    Arm Surgery      torn tendon    BACK SURGERY      x2    HYSTERECTOMY      RENETTA    MASTECTOMY      with reconstruction left breast    OOPHORECTOMY      RADIOFREQUENCY THERMAL COAGULATION, NERVE, SPINAL, CERVICAL, POSTERIOR RAMUS, MEDIAL BRANCH Left 2/8/2019    Performed by Raymond Britton MD at [facility] OR    Tonsillectomy      TONSILLECTOMY       Family History   Problem Relation Age of Onset    Breast cancer Neg Hx     Ovarian cancer Neg Hx      Social History     Tobacco Use    Smoking status: " Never Smoker    Smokeless tobacco: Never Used   Substance Use Topics    Alcohol use: No    Drug use: No     Review of patient's allergies indicates:   Allergen Reactions    Codeine Nausea And Vomiting    Pcn [penicillins] Nausea And Vomiting    Sulfa (sulfonamide antibiotics) Nausea And Vomiting    Sulfanilamide        Medications: I have reviewed the current medication administration record.    Medications Prior to Admission   Medication Sig Dispense Refill Last Dose    eszopiclone 3 mg Tab Take 3 mg by mouth nightly.  2 7/3/2019    levocetirizine (XYZAL) 5 MG tablet Take 5 mg by mouth once daily.   Past Week    montelukast (SINGULAIR) 10 mg tablet   0 7/3/2019    pantoprazole (PROTONIX) 40 MG tablet Take 40 mg by mouth every morning.  2 7/4/2019    ADVAIR DISKUS 250-50 mcg/dose diskus inhaler   0 More than a month    brompheniramine-pseudoeph-DM 2-30-10 mg/5 mL Syrp TAKE 1 TEASPOON EVERY 6 HOURS AS NEEDED FOR CONGESTION/COUGH  0 More than a month    estradiol (ESTRACE) 0.01 % (0.1 mg/gram) vaginal cream Insert 1gm vaginally HS on Mondays and Thursdays 3 Tube 3 Past Week at Unknown time    fluticasone (FLONASE) 50 mcg/actuation nasal spray 2 sprays by Each Nare route once daily.  6 More than a month    PROAIR HFA 90 mcg/actuation inhaler Inhale 1 puff into the lungs every 4 to 6 hours as needed.  0 More than a month       Review of Systems   Constitutional: Negative for chills, diaphoresis and fever.   HENT: Negative for hearing loss, tinnitus and trouble swallowing.    Eyes: Negative for visual disturbance.   Respiratory: Negative for shortness of breath.    Cardiovascular: Negative for chest pain and palpitations.   Gastrointestinal: Positive for nausea and vomiting.   Endocrine: Negative for cold intolerance.   Genitourinary: Negative for hematuria.   Musculoskeletal: Positive for neck pain. Negative for back pain and gait problem.   Allergic/Immunologic: Negative for immunocompromised state.    Neurological: Positive for headaches. Negative for dizziness, facial asymmetry, speech difficulty, weakness and numbness.   Hematological: Does not bruise/bleed easily.   Psychiatric/Behavioral: Negative for agitation, behavioral problems and confusion.     Objective:     Vital Signs (Most Recent):  Temp: 98.4 °F (36.9 °C) (07/05/19 0701)  Pulse: 96 (07/05/19 0901)  Resp: 16 (07/05/19 0901)  BP: 133/60 (07/05/19 0901)  SpO2: 97 % (07/05/19 0901)    Vital Signs Range (Last 24H):  Temp:  [97.9 °F (36.6 °C)-98.7 °F (37.1 °C)]   Pulse:  []   Resp:  [9-20]   BP: (109-185)/(55-85)   SpO2:  [92 %-99 %]     Physical Exam   Constitutional: She is oriented to person, place, and time. She appears well-developed and well-nourished.   HENT:   Head: Normocephalic and atraumatic.   Eyes: Pupils are equal, round, and reactive to light. EOM are normal.   Neck: Normal range of motion. Neck supple.   Cardiovascular: Normal rate and regular rhythm.   Pulmonary/Chest: No respiratory distress.   Abdominal: She exhibits no mass.   Genitourinary:   Genitourinary Comments: No tested     Musculoskeletal: She exhibits no edema or deformity.   Neurological: She is alert and oriented to person, place, and time. No cranial nerve deficit or sensory deficit. Coordination normal.   Skin: She is not diaphoretic. No erythema.   Psychiatric: She has a normal mood and affect. Her behavior is normal.   Nursing note and vitals reviewed.      Neurological Exam:   LOC: alert  Attention Span: Good   Language: No aphasia  Articulation: No dysarthria  Orientation: Person, Place, Time   Visual Fields: Full  EOM (CN III, IV, VI): Full/intact  Pupils (CN II, III): PERRL  Facial Sensation (CN V): Normal  Facial Movement (CN VII): Symmetric facial expression    Gag Reflex: present  Reflexes: 2+ throughout  Motor: Arm left  Normal 5/5  Leg left  Normal 5/5  Arm right  Normal 5/5  Leg right Normal 5/5  Cebellar: No evidence of appendicular or axial  ataxia  Sensation: Intact to light touch, temperature and vibration  Tone: Normal tone throughout      Laboratory:  CMP:   Recent Labs   Lab 07/05/19  0110   CALCIUM 9.2   ALBUMIN 3.8   PROT 7.1      K 4.5   CO2 16*      BUN 11   CREATININE 0.7   ALKPHOS 100   ALT 12   AST 18   BILITOT 0.5     CBC:   Recent Labs   Lab 07/05/19  0110   WBC 8.91   RBC 4.47   HGB 12.0   HCT 40.1      MCV 90   MCH 26.8*   MCHC 29.9*     Lipid Panel:   Recent Labs   Lab 07/05/19  0110   CHOL 256*   LDLCALC 179.2*   HDL 55   TRIG 109     Coagulation:   Recent Labs   Lab 07/04/19  1104   INR 1.1   APTT 33.0*     Hgb A1C:   Recent Labs   Lab 07/05/19  0110   HGBA1C 5.9*     TSH: No results for input(s): TSH in the last 168 hours.    Diagnostic Results:      Brain imaging:  CTH: Diffuse subarachnoid hemorrhage within the basal cisterns as well as in the midline falx.Ventricles and basal cisterns are normal.  No mass effect or midline shift.  No hydrocephalus.     Vessel Imaging:  CTA brain: Small anterior communicating artery aneurysm likely the origin of the recent subarachnoid bleed.    Cardiac Evaluation:   NSR bedside monitor      Ash Galarza MD  Comprehensive Stroke Center  Department of Vascular Neurology   [facility]

## 2019-07-05 NOTE — PROGRESS NOTES
[facility]  Neurosurgery  Progress Note    Subjective:     History of Present Illness: 63 yo with hx of breast cancer presents to the ED with complaints of headache after a barium CT abdomen on Tuesday. Reports intractable headache which began 2 days ago. Associated n/v, neck pain. Denies visual changes, weakness, paresthesias, b/b dysfunction. No trauma, falls, or LOC. She does not take antiplatelet therapy or anticoagulants. Currently on a cardene drip started at the outside hospital. She states she is tired from the morphine given prior to transport. Family at bedside.       Post-Op Info:  Procedure(s) (LRB):  ANGIOGRAM-CEREBRAL, acomm aneurysm (N/A)         Interval History: naeon    Medications:  Continuous Infusions:   niCARdipine 2.5 mg/hr (07/05/19 0605)     Scheduled Meds:  PRN Meds:acetaminophen, ondansetron     Review of Systems  Objective:     Weight: 70 kg (154 lb 5.2 oz)  Body mass index is 25.68 kg/m².  Vital Signs (Most Recent):  Temp: 98.5 °F (36.9 °C) (07/05/19 0305)  Pulse: 87 (07/05/19 0605)  Resp: 10 (07/05/19 0605)  BP: (!) 122/56 (07/05/19 0605)  SpO2: 96 % (07/05/19 0605) Vital Signs (24h Range):  Temp:  [97.9 °F (36.6 °C)-98.7 °F (37.1 °C)] 98.5 °F (36.9 °C)  Pulse:  [] 87  Resp:  [9-20] 10  SpO2:  [92 %-99 %] 96 %  BP: (109-185)/(55-85) 122/56                Oxygen Concentration (%):  [28] 28    Female External Urinary Catheter 07/04/19 1806 (Active)   Skin no redness;no breakdown 7/5/2019  3:05 AM   Tolerance no signs/symptoms of discomfort 7/5/2019  3:05 AM   Suction Continuous suction at 60 mmHg 7/4/2019  7:05 PM   Date of last wick change 07/04/19 7/4/2019  5:40 PM   Time of last wick change 1806 7/4/2019  5:40 PM   Output (mL) 300 mL 7/5/2019  5:05 AM       Neurosurgery Physical Exam   AOX3, speech fluent  PERRL, EOMI, face symm, tongue midline  CAMPBELL symm  SILT  No drift    Significant Labs:  Recent Labs   Lab 07/04/19  1104 07/05/19  0110   * 103   NA  142 138   K 3.9 4.5    109   CO2 29 16*   BUN 17 11   CREATININE 0.76 0.7   CALCIUM 9.7 9.2   MG  --  2.1     Recent Labs   Lab 07/04/19  1104 07/05/19  0110   WBC 7.08 8.91   HGB 12.7 12.0   HCT 41.4 40.1    212     Recent Labs   Lab 07/04/19  1104   INR 1.1   APTT 33.0*     Microbiology Results (last 7 days)     ** No results found for the last 168 hours. **            Significant Diagnostics:  CTA reviewed    Assessment/Plan:     Aneurysm of anterior communicating artery  64 F who presented with severe HA, neck pain, vomiting and found to have HH2 F2 SAH secondary to ruptured ACOM aneurysm.  -To angio for possible intervention.          Wilfredo Mccartney,   Neurosurgery  [facility]

## 2019-07-05 NOTE — HPI
" Mrs Jamison is a 63 yo with PMH significant for breast cancer s/p L mastectomy, who was in her usual state of health until 2 days ago when she began experiencing severe HA associated with n/v, and neck pain. Stated that the HA developed immediately after she was placed on the table to get a barium CT abdomen on Tuesday.   She denies associated vertigo, visual disturbances, focal weakness or numbness, slurred speech, or language impairment.  No recent falls or head trauma. She does not take antiplatelet therapy or anticoagulants.  A CTH done upon arrival to the ED yesterday demonstrated " diffuse subarachnoid hemorrhage within the basal cisterns as well as in the midline falx". CTA brain showed a small anterior communicating aneurysm.    "

## 2019-07-05 NOTE — ANESTHESIA PREPROCEDURE EVALUATION
[facility]  Anesthesia Pre-Operative Evaluation         Patient Name: Gissel Jamison  YOB: 1955  MRN: [id number]    SUBJECTIVE:     Pre-operative evaluation for Procedure(s) (LRB):  ANGIOGRAM-CEREBRAL, acomm aneurysm (N/A)     07/05/2019    Gissel Jamison is a 64 y.o. female w/ a significant PMHx of breast cancer s/p mastectomy, lumpectomy, who presented to the ER as a transfer for SAH detected on CTH. Here a CTA detected [place] aneurysm and patient is being sent for angio and further neurosurg intervention,.      Patient now presents for the above procedure(s).      LDA:        Peripheral IV - Single Lumen 07/04/19 1308 18 G Right Hand (Active)   Site Assessment Clean;Dry;No redness;Intact;No swelling 7/5/2019  7:01 AM   Line Status Flushed;Saline locked 7/5/2019  7:01 AM   Dressing Status Clean;Dry;Intact 7/5/2019  7:01 AM   Dressing Intervention Dressing reinforced 7/5/2019  7:01 AM   Dressing Change Due 07/08/19 7/5/2019  7:01 AM   Site Change Due 07/08/19 7/5/2019  7:01 AM   Reason Not Rotated Not due 7/5/2019  7:01 AM   Number of days: 0            Peripheral IV - Single Lumen 07/04/19 1012 20 G Right Antecubital (Active)   Site Assessment Clean;Dry;Intact;No redness;No swelling 7/5/2019  7:01 AM   Line Status Infusing 7/5/2019  7:01 AM   Dressing Status Clean;Dry;Intact 7/5/2019  7:01 AM   Dressing Intervention Dressing reinforced 7/5/2019  7:01 AM   Dressing Change Due 07/08/19 7/5/2019  7:01 AM   Site Change Due 07/08/19 7/5/2019  7:01 AM   Reason Not Rotated Not due 7/5/2019  7:01 AM   Number of days: 0       Female External Urinary Catheter 07/04/19 1806 (Active)   Skin perineum cleansed w/ soap and water;no redness;no breakdown 7/5/2019  7:01 AM   Tolerance no signs/symptoms of discomfort 7/5/2019  7:01 AM   Suction Continuous suction at 40 mmHg 7/5/2019  7:01 AM   Date of last wick change 07/05/19 7/5/2019  7:01 AM   Time of last wick change 0700 7/5/2019  7:01 AM   Output  (mL) 300 mL 7/5/2019  5:05 AM   Number of days: 0       Prev airway: None documented.    Drips:    niCARdipine 2.5 mg/hr (07/05/19 0701)       Patient Active Problem List   Diagnosis    Postmenopausal status    History of breast cancer    Mild intermittent asthma with status asthmaticus    Other spondylosis, cervical region    SAH (subarachnoid hemorrhage)    Aneurysm of anterior communicating artery    Seizure prophylaxis       Review of patient's allergies indicates:   Allergen Reactions    Codeine Nausea And Vomiting    Pcn [penicillins] Nausea And Vomiting    Sulfa (sulfonamide antibiotics) Nausea And Vomiting    Sulfanilamide        Current Inpatient Medications:   niMODipine  60 mg Oral Q4H       Current Facility-Administered Medications on File Prior to Encounter   Medication Dose Route Frequency Provider Last Rate Last Dose    0.9%  NaCl infusion   Intravenous Continuous Raymond Britton MD        sodium chloride 0.9% flush 10 mL  10 mL Intravenous PRN Raymond Britton MD         Current Outpatient Medications on File Prior to Encounter   Medication Sig Dispense Refill    eszopiclone 3 mg Tab Take 3 mg by mouth nightly.  2    levocetirizine (XYZAL) 5 MG tablet Take 5 mg by mouth once daily.      montelukast (SINGULAIR) 10 mg tablet   0    pantoprazole (PROTONIX) 40 MG tablet Take 40 mg by mouth every morning.  2    ADVAIR DISKUS 250-50 mcg/dose diskus inhaler   0    brompheniramine-pseudoeph-DM 2-30-10 mg/5 mL Syrp TAKE 1 TEASPOON EVERY 6 HOURS AS NEEDED FOR CONGESTION/COUGH  0    estradiol (ESTRACE) 0.01 % (0.1 mg/gram) vaginal cream Insert 1gm vaginally HS on Mondays and Thursdays 3 Tube 3    fluticasone (FLONASE) 50 mcg/actuation nasal spray 2 sprays by Each Nare route once daily.  6    PROAIR HFA 90 mcg/actuation inhaler Inhale 1 puff into the lungs every 4 to 6 hours as needed.  0       Past Surgical History:   Procedure Laterality Date    Arm Surgery      torn tendon     BACK SURGERY      x2    HYSTERECTOMY      RENETTA    MASTECTOMY      with reconstruction left breast    OOPHORECTOMY      RADIOFREQUENCY THERMAL COAGULATION, NERVE, SPINAL, CERVICAL, POSTERIOR RAMUS, MEDIAL BRANCH Left 2/8/2019    [facility] at [facility] OR    Tonsillectomy      TONSILLECTOMY         Social History     Socioeconomic History    Marital status:      Spouse name: Not on file    Number of children: Not on file    Years of education: Not on file    Highest education level: Not on file   Occupational History    Not on file   Social Needs    Financial resource strain: Not on file    Food insecurity:     Worry: Not on file     Inability: Not on file    Transportation needs:     Medical: Not on file     Non-medical: Not on file   Tobacco Use    Smoking status: Never Smoker    Smokeless tobacco: Never Used   Substance and Sexual Activity    Alcohol use: No    Drug use: No    Sexual activity: Yes     Partners: Male     Birth control/protection: Post-menopausal, Surgical   Lifestyle    Physical activity:     Days per week: Not on file     Minutes per session: Not on file    Stress: Not on file   Relationships    Social connections:     Talks on phone: Not on file     Gets together: Not on file     Attends [employer] service: Not on file     Active member of club or organization: Not on file     Attends meetings of clubs or organizations: Not on file     Relationship status: Not on file   Other Topics Concern    Not on file   Social History Narrative    Not on file       OBJECTIVE:     Vital Signs Range (Last 24H):  Temp:  [36.6 °C (97.9 °F)-37.1 °C (98.7 °F)]   Pulse:  []   Resp:  [9-20]   BP: (109-185)/(55-85)   SpO2:  [92 %-99 %]       Significant Labs:  Lab Results   Component Value Date    WBC 8.91 07/05/2019    HGB 12.0 07/05/2019    HCT 40.1 07/05/2019     07/05/2019    CHOL 256 (H) 07/05/2019    TRIG 109 07/05/2019    HDL 55 07/05/2019     ALT 12 07/05/2019    AST 18 07/05/2019     07/05/2019    K 4.5 07/05/2019     07/05/2019    CREATININE 0.7 07/05/2019    BUN 11 07/05/2019    CO2 16 (L) 07/05/2019    TSH 1.831 11/13/2013    INR 1.1 07/04/2019    HGBA1C 5.9 (H) 07/05/2019       Diagnostic Studies: No relevant studies.    EKG: No recent studies available.    2D ECHO:  No results found for this or any previous visit.      ASSESSMENT/PLAN:           Patient Active Problem List   Diagnosis    Postmenopausal status    History of breast cancer    Mild intermittent asthma with status asthmaticus    Other spondylosis, cervical region    SAH (subarachnoid hemorrhage)    Aneurysm of anterior communicating artery    Seizure prophylaxis     Past Surgical History:   Procedure Laterality Date    Arm Surgery      torn tendon    BACK SURGERY      x2    HYSTERECTOMY      RENETTA    MASTECTOMY      with reconstruction left breast    OOPHORECTOMY      RADIOFREQUENCY THERMAL COAGULATION, NERVE, SPINAL, CERVICAL, POSTERIOR RAMUS, MEDIAL BRANCH Left 2/8/2019    Performed by [facility] at [facility] OR    Tonsillectomy      TONSILLECTOMY       CTA        Impression:        Small anterior communicating artery aneurysm likely the origin of the recent subarachnoid bleed.     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                      Anesthesia Evaluation    I have reviewed the Patient Summary Reports.    I have reviewed the Nursing Notes.   I have reviewed the Medications.     Review of Systems  Anesthesia Hx:  Hx of Anesthetic complications  History of prior surgery of interest to airway management or planning: Denies Family Hx of Anesthesia complications.  Personal Hx of Anesthesia complications, Post-Operative Nausea/Vomiting   Social:  Non-Smoker    Hematology/Oncology:  Hematology Normal       -- Cancer in past history:  Breast surgery    EENT/Dental:EENT/Dental Normal   Pulmonary:   Asthma mild    Renal/:  Renal/  Normal     Hepatic/GI:  Hepatic/GI Normal    Musculoskeletal:   Arthritis     Neurological:   CVA Headaches    Endocrine:  Endocrine Normal    Psych:  Psychiatric Normal           Physical Exam  General:  Well nourished    Airway/Jaw/Neck:  Airway Findings: Mouth Opening: Normal Tongue: Normal  General Airway Assessment: Adult, Good  Mallampati: II  TM Distance: Normal, at least 6 cm  Jaw/Neck Findings:  Neck ROM: Normal ROM      Dental:  Dental Findings: In tact   Chest/Lungs:  Chest/Lungs Clear    Heart/Vascular:  Heart Findings: Normal Heart murmur: negative       Mental Status:  Mental Status Findings:  Cooperative, Alert and Oriented         Anesthesia Plan  Type of Anesthesia, risks & benefits discussed:  Anesthesia Type:  general  Patient's Preference:   Intra-op Monitoring Plan: standard ASA monitors and arterial line  Intra-op Monitoring Plan Comments:   Post Op Pain Control Plan: multimodal analgesia, IV/PO Opioids PRN and per primary service following discharge from PACU  Post Op Pain Control Plan Comments:   Induction:   IV  Beta Blocker:         Informed Consent: Patient understands risks and agrees with Anesthesia plan.  Questions answered. Anesthesia consent signed with patient.  ASA Score: 3  emergent   Day of Surgery Review of History & Physical:            Ready For Surgery From Anesthesia Perspective.

## 2019-07-05 NOTE — PLAN OF CARE
Problem: Adult Inpatient Plan of Care  Goal: Plan of Care Review  POC reviewed with pt and family at 1400. Pt verbalized understanding. Questions and concerns addressed with pt and spouse and in agreement with POC. Successful coiling done today. A-line placed while in IR. Cardine gtt continued to maintain sys BP <160. Pt progressing toward goals. Will continue to monitor. See flowsheets for full assessment and VS info.

## 2019-07-05 NOTE — PLAN OF CARE
07/05/19 1442   Discharge Assessment   Assessment Type Discharge Planning Assessment   Confirmed/corrected address and phone number on facesheet? Yes   Assessment information obtained from? Caregiver  ()   Expected Length of Stay (days) 14   Communicated expected length of stay with patient/caregiver yes   Prior to hospitilization cognitive status: Alert/Oriented   Prior to hospitalization functional status: Independent   Current cognitive status: Unable to Assess   Current Functional Status: Needs Assistance   Facility Arrived From: [facility]    Lives With spouse   Able to Return to Prior Arrangements yes   Is patient able to care for self after discharge? Unable to determine at this time (comments)   Who are your caregiver(s) and their phone number(s)? Khari Jamison () [phone]   Patient's perception of discharge disposition home or selfcare   Readmission Within the Last 30 Days no previous admission in last 30 days   Patient currently being followed by outpatient case management? No   Patient currently receives any other outside agency services? No   Equipment Currently Used at Home none   Do you have any problems affording any of your prescribed medications? TBD   Is the patient taking medications as prescribed? yes   Does the patient have transportation home? Yes   Transportation Anticipated family or friend will provide   Does the patient receive services at the [facility]? No   Discharge Plan A Home with family   Discharge Plan B Rehab   DME Needed Upon Discharge  other (see comments)  (tbd)   Patient/Family in Agreement with Plan yes         Discharge/ My Health Packet Folder Given to patient/family:      yes       PCP:  Magen Huerta Iii, MD        Pharmacy:    [facility]/pharmacy #[id number] - [place] - [id number] [facility]  [address] [place] [address]  Phone: [phone] Fax: [phone]        Emergency Contacts:  Extended Emergency Contact Information  Primary  Emergency Contact: Khari Jamison  Address: [address] [place] [address]           [place] [address] [place]  Home Phone: [phone]  Mobile Phone: [phone]  Relation: Spouse      Insurance:  Payor: [employer] BLUE [employer]IELD / Plan: [place] PPO / Product Type: PPO /     Katie Munroe RN, CCRN-K, [facility]  Neuro-Critical Care   [phone]

## 2019-07-05 NOTE — SUBJECTIVE & OBJECTIVE
Interval History: naeon    Medications:  Continuous Infusions:   niCARdipine 2.5 mg/hr (07/05/19 0605)     Scheduled Meds:  PRN Meds:acetaminophen, ondansetron     Review of Systems  Objective:     Weight: 70 kg (154 lb 5.2 oz)  Body mass index is 25.68 kg/m².  Vital Signs (Most Recent):  Temp: 98.5 °F (36.9 °C) (07/05/19 0305)  Pulse: 87 (07/05/19 0605)  Resp: 10 (07/05/19 0605)  BP: (!) 122/56 (07/05/19 0605)  SpO2: 96 % (07/05/19 0605) Vital Signs (24h Range):  Temp:  [97.9 °F (36.6 °C)-98.7 °F (37.1 °C)] 98.5 °F (36.9 °C)  Pulse:  [] 87  Resp:  [9-20] 10  SpO2:  [92 %-99 %] 96 %  BP: (109-185)/(55-85) 122/56                Oxygen Concentration (%):  [28] 28    Female External Urinary Catheter 07/04/19 1806 (Active)   Skin no redness;no breakdown 7/5/2019  3:05 AM   Tolerance no signs/symptoms of discomfort 7/5/2019  3:05 AM   Suction Continuous suction at 60 mmHg 7/4/2019  7:05 PM   Date of last wick change 07/04/19 7/4/2019  5:40 PM   Time of last wick change 1806 7/4/2019  5:40 PM   Output (mL) 300 mL 7/5/2019  5:05 AM       Neurosurgery Physical Exam   AOX3, speech fluent  PERRL, EOMI, face symm, tongue midline  CAMPBELL symm  SILT  No drift    Significant Labs:  Recent Labs   Lab 07/04/19  1104 07/05/19  0110   * 103    138   K 3.9 4.5    109   CO2 29 16*   BUN 17 11   CREATININE 0.76 0.7   CALCIUM 9.7 9.2   MG  --  2.1     Recent Labs   Lab 07/04/19  1104 07/05/19  0110   WBC 7.08 8.91   HGB 12.7 12.0   HCT 41.4 40.1    212     Recent Labs   Lab 07/04/19  1104   INR 1.1   APTT 33.0*     Microbiology Results (last 7 days)     ** No results found for the last 168 hours. **            Significant Diagnostics:  CTA reviewed

## 2019-07-05 NOTE — ASSESSMENT & PLAN NOTE
64 F who presented with severe HA, neck pain, vomiting and found to have HH2 F2 SAH secondary to ruptured ACOM aneurysm.  -To angio for possible intervention.

## 2019-07-05 NOTE — ASSESSMENT & PLAN NOTE
63 yo with PMH significant for breast cancer s/p L mastectomy, admitted with grade II HH aneurysmatic SAH due to likely rupture small anterior communicating artery aneurysm.  Non focal neuro-exam.   Plan for angio and coiling today.  Nimodipine, keppra, euvolemia, nicardipine iv SBP<140, daily TCD.  Will continue to follow.

## 2019-07-05 NOTE — TRANSFER OF CARE
"Anesthesia Transfer of Care Note    Patient: Gissel Jamison    Procedure(s) Performed: Procedure(s) (LRB):  ANGIOGRAM-CEREBRAL, acomm aneurysm (N/A)    Patient location: PACU    Anesthesia Type: general    Transport from OR: Transported from OR on room air with adequate spontaneous ventilation. Continuous ECG monitoring in transport. Continuous SpO2 monitoring in transport. Continuos invasive BP monitoring in transport    Post pain: adequate analgesia    Post assessment: no apparent anesthetic complications and tolerated procedure well    Post vital signs: stable    Level of consciousness: awake, alert and oriented    Nausea/Vomiting: no nausea/vomiting    Complications: none    Transfer of care protocol was followed      Last vitals: 07/05/19 1408  Visit Vitals  /58   Pulse 96   Temp 98.3   Resp 12   Ht 5' 5" (1.651 m)   Wt 70 kg (154 lb 5.2 oz)   SpO2 96%   Breastfeeding? No   BMI 25.68 kg/m²     "

## 2019-07-05 NOTE — PROGRESS NOTES
[facility]  Neurocritical Care  Progress Note    Admit Date: 7/4/2019  Service Date: 07/05/2019  Length of Stay: 1    Subjective:     Chief Complaint: <principal problem not specified>    History of Present Illness: No notes on file    Hospital Course: 07/05/2019 NAEO. For conventional angiogram today ACOM aneurysm on CTA      Review of Symptoms:   Constitutional: Denies fevers, weight loss, chills, or weakness.   Eyes: Denies changes in vision.   ENT: Denies dysphagia, nasal discharge, ear pain or discharge.   Cardiovascular: Denies chest pain, palpitations, orthopnea, or claudication.   Respiratory: Denies shortness of breath, cough, hemoptysis, or wheezing.   GI: Denies nausea/vomitting, hematochezia, melena, abd pain, or changes in appetite.   : Denies dysuria, incontinence, or hematuria.   Musculoskeletal: Denies joint pain or myalgias.   Skin/breast: Denies rashes, lumps, lesions, or discharge.   Neurologic: Denies headache, dizziness, vertigo, or paresthesias.   Psychiatric: Denies changes in mood or hallucinations.   Endocrine: Denies polyuria, polydipsia, heat/cold intolerance.   Hematologic/Lymph: Denies lymphadenopathy, easy bruising or easy bleeding.   Allergic/Immunologic: Denies rash, rhinitis      Medications:  Continuous Infusions:   niCARdipine 2.5 mg/hr (07/05/19 0701)     Scheduled Meds:   niMODipine  60 mg Oral Q4H     PRN Meds:.acetaminophen, ondansetron    OBJECTIVE:   Vital Signs (Most Recent):   Temp: 98.4 °F (36.9 °C) (07/05/19 0701)  Pulse: 85 (07/05/19 0701)  Resp: 11 (07/05/19 0701)  BP: (!) 143/67 (07/05/19 0701)  SpO2: 97 % (07/05/19 0701)    Vital Signs (24h Range):   Temp:  [97.9 °F (36.6 °C)-98.7 °F (37.1 °C)] 98.4 °F (36.9 °C)  Pulse:  [] 85  Resp:  [9-20] 11  SpO2:  [92 %-99 %] 97 %  BP: (109-185)/(55-85) 143/67    ICP/CPP (Last 24h):        I & O (Last 24h):     Intake/Output Summary (Last 24 hours) at 7/5/2019 0851  Last data filed at 7/5/2019  0701  Gross per 24 hour   Intake 584.16 ml   Output 800 ml   Net -215.84 ml     Physical Exam:  GA: Alert, comfortable, no acute distress.   HEENT: No scleral icterus or JVD. Neck supple  Pulmonary: Air entry equal to auscultation A/P/L. Wheezing no, crackles no  Cardiac: RRR, S1 & S2 w/o rubs/murmurs/gallops.   Abdominal: soft, non-tender, bowel sounds present x 4. No appreciable hepatosplenomegaly.  Skin: No jaundice, rashes, or visible lesions.  Neuro:  --GCS: 15  --Mental Status:   Awake and alert, aao x3. Fluent, follows commands.   --CN II-XII grossly intact.   --Pupils 3.5 mm, PERRL.   --Corneal reflex not done in this awake patient, gag, cough intact.  --LUE strength: 5/5  --RUE strength: 5/5  --LLE strength: 5/5  --RLE strength: 5/5  MSK:  No edema in UE and LE    Vent Data:   Oxygen Concentration (%):  [28] 28    Lines/Drains/Airway:         Female External Urinary Catheter 07/04/19 1806 (Active)   Skin perineum cleansed w/ soap and water;no redness;no breakdown 7/5/2019  7:01 AM   Tolerance no signs/symptoms of discomfort 7/5/2019  7:01 AM   Suction Continuous suction at 40 mmHg 7/5/2019  7:01 AM   Date of last wick change 07/05/19 7/5/2019  7:01 AM   Time of last wick change 0700 7/5/2019  7:01 AM   Output (mL) 300 mL 7/5/2019  5:05 AM     Nutrition/Tube Feeds (if NPO state why): npo for angiogram     Labs:  ABG: No results for input(s): PH, PO2, PCO2, HCO3, POCSATURATED, BE in the last 24 hours.  BMP:  Recent Labs   Lab 07/05/19  0110      K 4.5      CO2 16*   BUN 11   CREATININE 0.7      MG 2.1   PHOS 3.9     LFT:   Lab Results   Component Value Date    AST 18 07/05/2019    ALT 12 07/05/2019    ALKPHOS 100 07/05/2019    BILITOT 0.5 07/05/2019    ALBUMIN 3.8 07/05/2019    PROT 7.1 07/05/2019     CBC:   Lab Results   Component Value Date    WBC 8.91 07/05/2019    HGB 12.0 07/05/2019    HCT 40.1 07/05/2019    MCV 90 07/05/2019     07/05/2019     Microbiology x 7d:   Microbiology  Results (last 7 days)     ** No results found for the last 168 hours. **        Imaging:    I personally reviewed the above image.  Today I independently reviewed pertinent medications, lines/drains/airways, imaging, cardiology results, laboratory results, microbiology results, notably:   ASSESSMENT/PLAN:     Active Hospital Problems    Diagnosis    SAH (subarachnoid hemorrhage)     65 yo with hx of breast cancer presents to the ED with complaints of intractable headache after a barium CT abdomen on 7/2. NSGY consulted for SAH found on CT head.    - Neurologically stable on initial assessment. HH2F2   - Stat CTA head ordered  - CT head @11am reviewed. Diffuse SAH within basal cisterns and midline falx. No evidence for hydrocephalus on this study. No mass effect or midline shift.   - NPO  - Keppra 500 BID x7d  - TCDs daily x14d  - Nimotop 60q4 x 21d  - SBP < 140  - Na > 135  - Maintain euvolemia  - Consult to Neurocritical care for ICU monitoring  - Further recs pending CTA  - Discussed with Dr. Beatty       Aneurysm of anterior communicating artery      Neuro:   SAH day 1  Plan for angiogram  Nimodipine/ daily TCD  Maintain euvolemia   q1hr neurochecks.     Pulmonary:   Saturations > 92% on room air    Cardiac:   SBP < 140 mmhg on nicardipine gtt  Then permissive hypertension once ACOM aneurysm is secured    Renal:    Monitor urine output and sodium for signs of CSW    ID:   Afebrile, normal wbc    Hem/Onc:   Stable hh and plt count    Endocrine:    BS stable    Fluids/Electrolytes/Nutrition/GI:   Npo for angiogram  Replete lytes as needed  Lines:  Art  CVC  ETT  Man  NG  PEG    Proph:  DVT:SCD/no heparin due to recent aSAH  Constipation:  Last output:bowel regimen as needed  PUP:NA  VAP:NA      Uninterrupted Critical Care/Counseling Time (not including procedures):: III    Don Alvarez MD  Neurocritical care attending    Full Code    Don Alvarez MD  Neurocritical Care  [facility]  [place]

## 2019-07-05 NOTE — PLAN OF CARE
07/05/19 1447   Post-Acute Status   Post-Acute Authorization Other;Placement   Post-Acute Placement Status Awaiting Internal Medical Clearance   Discharge Delays None known at this time       Katie Munroe RN, CCRN-K, [facility]  Neuro-Critical Care   [phone]

## 2019-07-05 NOTE — SUBJECTIVE & OBJECTIVE
Past Medical History:   Diagnosis Date    Asthma     Breast cancer     Fibromyalgia     PONV (postoperative nausea and vomiting)     for general anesthesia     Past Surgical History:   Procedure Laterality Date    Arm Surgery      torn tendon    BACK SURGERY      x2    HYSTERECTOMY      RENETTA    MASTECTOMY      with reconstruction left breast    OOPHORECTOMY      RADIOFREQUENCY THERMAL COAGULATION, NERVE, SPINAL, CERVICAL, POSTERIOR RAMUS, MEDIAL BRANCH Left 2/8/2019    Performed by [facility] at [facility]    Tonsillectomy      TONSILLECTOMY       Family History   Problem Relation Age of Onset    Breast cancer Neg Hx     Ovarian cancer Neg Hx      Social History     Tobacco Use    Smoking status: Never Smoker    Smokeless tobacco: Never Used   Substance Use Topics    Alcohol use: No    Drug use: No     Review of patient's allergies indicates:   Allergen Reactions    Codeine Nausea And Vomiting    Pcn [penicillins] Nausea And Vomiting    Sulfa (sulfonamide antibiotics) Nausea And Vomiting    Sulfanilamide        Medications: I have reviewed the current medication administration record.    Medications Prior to Admission   Medication Sig Dispense Refill Last Dose    eszopiclone 3 mg Tab Take 3 mg by mouth nightly.  2 7/3/2019    levocetirizine (XYZAL) 5 MG tablet Take 5 mg by mouth once daily.   Past Week    montelukast (SINGULAIR) 10 mg tablet   0 7/3/2019    pantoprazole (PROTONIX) 40 MG tablet Take 40 mg by mouth every morning.  2 7/4/2019    ADVAIR DISKUS 250-50 mcg/dose diskus inhaler   0 More than a month    brompheniramine-pseudoeph-DM 2-30-10 mg/5 mL Syrp TAKE 1 TEASPOON EVERY 6 HOURS AS NEEDED FOR CONGESTION/COUGH  0 More than a month    estradiol (ESTRACE) 0.01 % (0.1 mg/gram) vaginal cream Insert 1gm vaginally HS on Mondays and Thursdays 3 Tube 3 Past Week at Unknown time    fluticasone (FLONASE) 50 mcg/actuation nasal spray 2 sprays by Each Nare route once daily.   6 More than a month    PROAIR HFA 90 mcg/actuation inhaler Inhale 1 puff into the lungs every 4 to 6 hours as needed.  0 More than a month       Review of Systems   Constitutional: Negative for chills, diaphoresis and fever.   HENT: Negative for hearing loss, tinnitus and trouble swallowing.    Eyes: Negative for visual disturbance.   Respiratory: Negative for shortness of breath.    Cardiovascular: Negative for chest pain and palpitations.   Gastrointestinal: Positive for nausea and vomiting.   Endocrine: Negative for cold intolerance.   Genitourinary: Negative for hematuria.   Musculoskeletal: Positive for neck pain. Negative for back pain and gait problem.   Allergic/Immunologic: Negative for immunocompromised state.   Neurological: Positive for headaches. Negative for dizziness, facial asymmetry, speech difficulty, weakness and numbness.   Hematological: Does not bruise/bleed easily.   Psychiatric/Behavioral: Negative for agitation, behavioral problems and confusion.     Objective:     Vital Signs (Most Recent):  Temp: 98.4 °F (36.9 °C) (07/05/19 0701)  Pulse: 96 (07/05/19 0901)  Resp: 16 (07/05/19 0901)  BP: 133/60 (07/05/19 0901)  SpO2: 97 % (07/05/19 0901)    Vital Signs Range (Last 24H):  Temp:  [97.9 °F (36.6 °C)-98.7 °F (37.1 °C)]   Pulse:  []   Resp:  [9-20]   BP: (109-185)/(55-85)   SpO2:  [92 %-99 %]     Physical Exam   Constitutional: She is oriented to person, place, and time. She appears well-developed and well-nourished.   HENT:   Head: Normocephalic and atraumatic.   Eyes: Pupils are equal, round, and reactive to light. EOM are normal.   Neck: Normal range of motion. Neck supple.   Cardiovascular: Normal rate and regular rhythm.   Pulmonary/Chest: No respiratory distress.   Abdominal: She exhibits no mass.   Genitourinary:   Genitourinary Comments: No tested     Musculoskeletal: She exhibits no edema or deformity.   Neurological: She is alert and oriented to person, place, and time. No  cranial nerve deficit or sensory deficit. Coordination normal.   Skin: She is not diaphoretic. No erythema.   Psychiatric: She has a normal mood and affect. Her behavior is normal.   Nursing note and vitals reviewed.      Neurological Exam:   LOC: alert  Attention Span: Good   Language: No aphasia  Articulation: No dysarthria  Orientation: Person, Place, Time   Visual Fields: Full  EOM (CN III, IV, VI): Full/intact  Pupils (CN II, III): PERRL  Facial Sensation (CN V): Normal  Facial Movement (CN VII): Symmetric facial expression    Gag Reflex: present  Reflexes: 2+ throughout  Motor: Arm left  Normal 5/5  Leg left  Normal 5/5  Arm right  Normal 5/5  Leg right Normal 5/5  Cebellar: No evidence of appendicular or axial ataxia  Sensation: Intact to light touch, temperature and vibration  Tone: Normal tone throughout      Laboratory:  CMP:   Recent Labs   Lab 07/05/19  0110   CALCIUM 9.2   ALBUMIN 3.8   PROT 7.1      K 4.5   CO2 16*      BUN 11   CREATININE 0.7   ALKPHOS 100   ALT 12   AST 18   BILITOT 0.5     CBC:   Recent Labs   Lab 07/05/19  0110   WBC 8.91   RBC 4.47   HGB 12.0   HCT 40.1      MCV 90   MCH 26.8*   MCHC 29.9*     Lipid Panel:   Recent Labs   Lab 07/05/19  0110   CHOL 256*   LDLCALC 179.2*   HDL 55   TRIG 109     Coagulation:   Recent Labs   Lab 07/04/19  1104   INR 1.1   APTT 33.0*     Hgb A1C:   Recent Labs   Lab 07/05/19  0110   HGBA1C 5.9*     TSH: No results for input(s): TSH in the last 168 hours.    Diagnostic Results:      Brain imaging:  CTH: Diffuse subarachnoid hemorrhage within the basal cisterns as well as in the midline falx.Ventricles and basal cisterns are normal.  No mass effect or midline shift.  No hydrocephalus.     Vessel Imaging:  CTA brain: Small anterior communicating artery aneurysm likely the origin of the recent subarachnoid bleed.    Cardiac Evaluation:   NSR bedside monitor

## 2019-07-05 NOTE — PROCEDURES
Radiology Post-Procedure Note    Pre Op Diagnosis: anterior communicating artery aneurysm    Post Op Diagnosis: Same    Procedure: Cerebral aneurysm coiling    Procedure performed by: Nathanael Lopez MD    Written Informed Consent Obtained: Yes    Specimen Removed: NO    Estimated Blood Loss: 200     Findings: Acom aneurysm treated w coil.   Follow up showed small filling defect in anterior segments of L MCA, treated w improvement with integrillin IA.    Patient tolerated procedure well.    Nathanael Lopez MD  Vascular and Interventional Neurology Staff  Director of [facility]  [facility]  [phone]

## 2019-07-05 NOTE — NURSING
Notified MD Kameron of pt returning from IR with a-line in L radial with limb alert d/t past mastectomy/lympectomy. No new orders at this time.  addressing with doctor to verify arterial line placement vs. Peripheral IV insertion. Will continue to monitor.

## 2019-07-06 LAB
ALBUMIN SERPL BCP-MCNC: 3.6 G/DL (ref 3.5–5.2)
ALP SERPL-CCNC: 101 U/L (ref 55–135)
ALT SERPL W/O P-5'-P-CCNC: 11 U/L (ref 10–44)
ANION GAP SERPL CALC-SCNC: 11 MMOL/L (ref 8–16)
AST SERPL-CCNC: 12 U/L (ref 10–40)
BASOPHILS # BLD AUTO: 0.01 K/UL (ref 0–0.2)
BASOPHILS NFR BLD: 0.1 % (ref 0–1.9)
BILIRUB SERPL-MCNC: 0.5 MG/DL (ref 0.1–1)
BUN SERPL-MCNC: 14 MG/DL (ref 8–23)
CALCIUM SERPL-MCNC: 9.5 MG/DL (ref 8.7–10.5)
CHLORIDE SERPL-SCNC: 108 MMOL/L (ref 95–110)
CO2 SERPL-SCNC: 23 MMOL/L (ref 23–29)
CREAT SERPL-MCNC: 0.8 MG/DL (ref 0.5–1.4)
DIFFERENTIAL METHOD: ABNORMAL
EOSINOPHIL # BLD AUTO: 0 K/UL (ref 0–0.5)
EOSINOPHIL NFR BLD: 0 % (ref 0–8)
ERYTHROCYTE [DISTWIDTH] IN BLOOD BY AUTOMATED COUNT: 13.2 % (ref 11.5–14.5)
EST. GFR  (AFRICAN AMERICAN): >60 ML/MIN/1.73 M^2
EST. GFR  (NON AFRICAN AMERICAN): >60 ML/MIN/1.73 M^2
GLUCOSE SERPL-MCNC: 147 MG/DL (ref 70–110)
HCT VFR BLD AUTO: 39.2 % (ref 37–48.5)
HGB BLD-MCNC: 12.1 G/DL (ref 12–16)
IMM GRANULOCYTES # BLD AUTO: 0.06 K/UL (ref 0–0.04)
IMM GRANULOCYTES NFR BLD AUTO: 0.6 % (ref 0–0.5)
LYMPHOCYTES # BLD AUTO: 0.9 K/UL (ref 1–4.8)
LYMPHOCYTES NFR BLD: 9.4 % (ref 18–48)
MAGNESIUM SERPL-MCNC: 2.4 MG/DL (ref 1.6–2.6)
MCH RBC QN AUTO: 27.4 PG (ref 27–31)
MCHC RBC AUTO-ENTMCNC: 30.9 G/DL (ref 32–36)
MCV RBC AUTO: 89 FL (ref 82–98)
MONOCYTES # BLD AUTO: 0.2 K/UL (ref 0.3–1)
MONOCYTES NFR BLD: 1.6 % (ref 4–15)
NEUTROPHILS # BLD AUTO: 8.3 K/UL (ref 1.8–7.7)
NEUTROPHILS NFR BLD: 88.3 % (ref 38–73)
NRBC BLD-RTO: 0 /100 WBC
PHOSPHATE SERPL-MCNC: 3.1 MG/DL (ref 2.7–4.5)
PLATELET # BLD AUTO: 259 K/UL (ref 150–350)
PMV BLD AUTO: 9.5 FL (ref 9.2–12.9)
POCT GLUCOSE: 138 MG/DL (ref 70–110)
POTASSIUM SERPL-SCNC: 3.7 MMOL/L (ref 3.5–5.1)
PROT SERPL-MCNC: 6.9 G/DL (ref 6–8.4)
RBC # BLD AUTO: 4.42 M/UL (ref 4–5.4)
SODIUM SERPL-SCNC: 142 MMOL/L (ref 136–145)
WBC # BLD AUTO: 9.42 K/UL (ref 3.9–12.7)

## 2019-07-06 PROCEDURE — 99233 PR SUBSEQUENT HOSPITAL CARE,LEVL III: ICD-10-PCS | Mod: ,,, | Performed by: PSYCHIATRY & NEUROLOGY

## 2019-07-06 PROCEDURE — 20000000 HC ICU ROOM

## 2019-07-06 PROCEDURE — 99233 SBSQ HOSP IP/OBS HIGH 50: CPT | Mod: ,,, | Performed by: NURSE PRACTITIONER

## 2019-07-06 PROCEDURE — 27000221 HC OXYGEN, UP TO 24 HOURS

## 2019-07-06 PROCEDURE — 25000003 PHARM REV CODE 250: Performed by: STUDENT IN AN ORGANIZED HEALTH CARE EDUCATION/TRAINING PROGRAM

## 2019-07-06 PROCEDURE — 80053 COMPREHEN METABOLIC PANEL: CPT

## 2019-07-06 PROCEDURE — 94761 N-INVAS EAR/PLS OXIMETRY MLT: CPT

## 2019-07-06 PROCEDURE — 94760 N-INVAS EAR/PLS OXIMETRY 1: CPT

## 2019-07-06 PROCEDURE — 99233 SBSQ HOSP IP/OBS HIGH 50: CPT | Mod: ,,, | Performed by: PSYCHIATRY & NEUROLOGY

## 2019-07-06 PROCEDURE — 99233 PR SUBSEQUENT HOSPITAL CARE,LEVL III: ICD-10-PCS | Mod: ,,, | Performed by: NURSE PRACTITIONER

## 2019-07-06 PROCEDURE — 85025 COMPLETE CBC W/AUTO DIFF WBC: CPT

## 2019-07-06 PROCEDURE — 63600175 PHARM REV CODE 636 W HCPCS: Performed by: NURSE PRACTITIONER

## 2019-07-06 PROCEDURE — 25000003 PHARM REV CODE 250: Performed by: NURSE PRACTITIONER

## 2019-07-06 PROCEDURE — 84100 ASSAY OF PHOSPHORUS: CPT

## 2019-07-06 PROCEDURE — 83735 ASSAY OF MAGNESIUM: CPT

## 2019-07-06 RX ORDER — ENOXAPARIN SODIUM 100 MG/ML
40 INJECTION SUBCUTANEOUS
Status: DISCONTINUED | OUTPATIENT
Start: 2019-07-06 | End: 2019-07-18 | Stop reason: HOSPADM

## 2019-07-06 RX ORDER — INSULIN ASPART 100 [IU]/ML
1-10 INJECTION, SOLUTION INTRAVENOUS; SUBCUTANEOUS
Status: DISCONTINUED | OUTPATIENT
Start: 2019-07-06 | End: 2019-07-10

## 2019-07-06 RX ORDER — SODIUM CHLORIDE 9 MG/ML
INJECTION, SOLUTION INTRAVENOUS CONTINUOUS
Status: DISCONTINUED | OUTPATIENT
Start: 2019-07-06 | End: 2019-07-07

## 2019-07-06 RX ORDER — IBUPROFEN 200 MG
16 TABLET ORAL
Status: DISCONTINUED | OUTPATIENT
Start: 2019-07-06 | End: 2019-07-10

## 2019-07-06 RX ORDER — IBUPROFEN 200 MG
24 TABLET ORAL
Status: DISCONTINUED | OUTPATIENT
Start: 2019-07-06 | End: 2019-07-10

## 2019-07-06 RX ORDER — GLUCAGON 1 MG
1 KIT INJECTION
Status: DISCONTINUED | OUTPATIENT
Start: 2019-07-06 | End: 2019-07-10

## 2019-07-06 RX ADMIN — NIMODIPINE 60 MG: 30 CAPSULE, LIQUID FILLED ORAL at 05:07

## 2019-07-06 RX ADMIN — SODIUM CHLORIDE: 0.9 INJECTION, SOLUTION INTRAVENOUS at 09:07

## 2019-07-06 RX ADMIN — ENOXAPARIN SODIUM 40 MG: 100 INJECTION SUBCUTANEOUS at 05:07

## 2019-07-06 RX ADMIN — NIMODIPINE 60 MG: 30 CAPSULE, LIQUID FILLED ORAL at 09:07

## 2019-07-06 RX ADMIN — NIMODIPINE 60 MG: 30 CAPSULE, LIQUID FILLED ORAL at 01:07

## 2019-07-06 NOTE — ASSESSMENT & PLAN NOTE
64 F who presented with severe HA, neck pain, vomiting and found to have HH2 F2 SAH secondary to ruptured ACOM aneurysm now s/p coiling 7/5:    Neuro:  --Patient admitted to Neuro-ICU; preponderance of medical care per NCC      -Q1h neurochecks while in ICU  --HOB@30  --Keppra 500 BID x7d  --TCDs daily x14d  --Nimotop 60q4 x 21d  --Continue to monitor   --Continue to follow clinically and notify NSGY immediately if any neurostatus change    CVS:  ---220 mmHg (cardene ggt; hydralazine & labetalol PRN; transition to home meds when appropriate per NCC)  --Recc Echo for baseline cardiac status    Pulm:  --Recc CXR for baseline resp status  --Aggressive pulm treatment per NCC    GIT/Electrolytes:  --CMP daily      -Replete electrolytes PRN per NCC  --Na goal >135  --Recc Utox/UA  --NPO at this time  --PPI    Renal:  --Strict I/Os  --Goal of euvolemia or net +1L    Heme/ID:  --Daily CBC      -Transfuse PRN  --Trend WBC and T    PPx:  --TEDs/SCDs  --SQH appropriate once aneurysm secured  --PPI    Dispo: Continued ICU care at this time, PT/OT when appropriate per NCC, CM/SW consult for dispo planning

## 2019-07-06 NOTE — PROGRESS NOTES
[facility]  Neurosurgery  Progress Note    Subjective:     History of Present Illness: 65 yo with hx of breast cancer presents to the ED with complaints of headache after a barium CT abdomen on Tuesday. Reports intractable headache which began 2 days ago. Associated n/v, neck pain. Denies visual changes, weakness, paresthesias, b/b dysfunction. No trauma, falls, or LOC. She does not take antiplatelet therapy or anticoagulants. Currently on a cardene drip started at the outside hospital. She states she is tired from the morphine given prior to transport. Family at bedside.       Post-Op Info:  Procedure(s) (LRB):  ANGIOGRAM-CEREBRAL, acomm aneurysm (N/A)   1 Day Post-Op     Interval History: 7/6 PBD 3, NAEON, AFVSS, Exam stable    Medications:  Continuous Infusions:   sodium chloride 0.9% 75 mL/hr at 07/06/19 1201     Scheduled Meds:   enoxparin  40 mg Subcutaneous Q24H    niMODipine  60 mg Oral Q4H     PRN Meds:acetaminophen, ondansetron, sodium chloride 0.9%     Review of Systems    Objective:     Weight: 70 kg (154 lb 5.2 oz)  Body mass index is 25.68 kg/m².  Vital Signs (Most Recent):  Temp: 98.1 °F (36.7 °C) (07/06/19 1101)  Pulse: 79 (07/06/19 1201)  Resp: (!) 22 (07/06/19 1201)  BP: 117/66 (07/06/19 1201)  SpO2: 96 % (07/06/19 1201) Vital Signs (24h Range):  Temp:  [97 °F (36.1 °C)-98.7 °F (37.1 °C)] 98.1 °F (36.7 °C)  Pulse:  [61-90] 79  Resp:  [12-31] 22  SpO2:  [92 %-99 %] 96 %  BP: ()/(52-77) 117/66  Arterial Line BP: (104-164)/(47-69) 110/52     Date 07/06/19 0700 - 07/07/19 0659   Shift 6419-6445 4387-5739 7448-2319 24 Hour Total   INTAKE   I.V.(mL/kg) 181.3(2.6)   181.3(2.6)   Shift Total(mL/kg) 181.3(2.6)   181.3(2.6)   OUTPUT   Shift Total(mL/kg)       Weight (kg) 70 70 70 70                   Female External Urinary Catheter 07/04/19 1806 (Active)   Skin no redness;no breakdown 7/5/2019  3:05 AM   Tolerance no signs/symptoms of discomfort 7/5/2019  3:05 AM   Suction  Continuous suction at 60 mmHg 7/4/2019  7:05 PM   Date of last wick change 07/04/19 7/4/2019  5:40 PM   Time of last wick change 1806 7/4/2019  5:40 PM   Output (mL) 300 mL 7/5/2019  5:05 AM       Neurosurgery Physical Exam  General: AOx3, GCS E4V5M6  CNII-XII: Intact on fine exam, PERRL, EOMi, facial sensation preserved, no facial assymetry, tongue/uvula/palate midline, shoulder shrug equal, no pronator drift  Extremities: 5/5 motor throughout, sensorium intact throughout, coordination intact throughout, DTRs 2+, no pathological reflexes    Significant Labs:  Recent Labs   Lab 07/05/19  0110 07/06/19  0115    147*    142   K 4.5 3.7    108   CO2 16* 23   BUN 11 14   CREATININE 0.7 0.8   CALCIUM 9.2 9.5   MG 2.1 2.4     Recent Labs   Lab 07/05/19  0110 07/06/19  0115   WBC 8.91 9.42   HGB 12.0 12.1   HCT 40.1 39.2    259     No results for input(s): LABPT, INR, APTT in the last 48 hours.  Microbiology Results (last 7 days)     ** No results found for the last 168 hours. **        Significant Diagnostics:  All significant diagnostics reviewed    Assessment/Plan:     Aneurysm of anterior communicating artery  64 F who presented with severe HA, neck pain, vomiting and found to have HH2 F2 SAH secondary to ruptured ACOM aneurysm now s/p coiling 7/5:    Neuro:  --Patient admitted to Neuro-ICU; preponderance of medical care per NCC      -Q1h neurochecks while in ICU  --HOB@30  --Keppra 500 BID x7d  --TCDs daily x14d  --Nimotop 60q4 x 21d  --Continue to monitor   --Continue to follow clinically and notify NSGY immediately if any neurostatus change    CVS:  ---220 mmHg (cardene ggt; hydralazine & labetalol PRN; transition to home meds when appropriate per NCC)  --Recc Echo for baseline cardiac status    Pulm:  --Recc CXR for baseline resp status  --Aggressive pulm treatment per NCC    GIT/Electrolytes:  --CMP daily      -Replete electrolytes PRN per NCC  --Na goal >135  --Recc Utox/UA  --NPO  at this time  --PPI    Renal:  --Strict I/Os  --Goal of euvolemia or net +1L    Heme/ID:  --Daily CBC      -Transfuse PRN  --Trend WBC and T    PPx:  --TEDs/SCDs  --SQH appropriate once aneurysm secured  --PPI    Dispo: Continued ICU care at this time, PT/OT when appropriate per NCC, CM/SW consult for dispo planning        Eugene Moreno MD  Neurosurgery  [facility]

## 2019-07-06 NOTE — PROGRESS NOTES
[facility]  Neurocritical Care  Progress Note    Admit Date: 7/4/2019  Service Date: 07/06/2019  Length of Stay: 2    Subjective:     Chief Complaint: Nontraumatic subarachnoid hemorrhage from anterior communicating artery    History of Present Illness: 64 y F with medical h.o breast cancer s/p mastectomy, lumpectomy, who presented to the ER as a transfer from [facility] as SAH detected on CTH.  Patient's symptoms started on Tuesday 7/2 when she was undergoing a CT abdomen for evaluation of indigestion. She started to feel a pounding and congestion of head and ears. She did not seek medical attention till today 7/4 when she went to urgent care. Urgent care doc did not feel comfortable treating it as migraine and sent patient to ER; where CT head detected SAH. She is transferred here to [facility].  Here a CTA detected CAROLINA aneurysm and patient is being sent for angio and further neurosurg intervention,.  Hospital Course: 07/05/2019 NAEO. For conventional angiogram today ACOM aneurysm on CTA  7/7/19: add lovenox for DVT proph., initiate IVF, PT/OT, liberalize BP <200        Review of Systems   Constitutional: Denies fevers, weight loss, chills, or weakness.  Eyes: Denies changes in vision.  ENT: Denies dysphagia, nasal discharge, ear pain or discharge.  Cardiovascular: Denies chest pain, palpitations, orthopnea, or claudication.  Respiratory: Denies shortness of breath, cough, hemoptysis, or wheezing.  GI: Denies nausea/vomitting, hematochezia, melena, abd pain, or changes in appetite.  : Denies dysuria, incontinence, or hematuria.  Musculoskeletal: Denies joint pain or myalgias.  Skin/breast: Denies rashes, lumps, lesions, or discharge.  Neurologic: Denies dizziness, vertigo, or paresthesias. Complains of headache  Psychiatric: Denies changes in mood or hallucinations.  Endocrine: Denies polyuria, polydipsia, heat/cold intolerance.  Hematologic/Lymph: Denies lymphadenopathy, easy bruising or  easy bleeding.  Allergic/Immunologic: Denies rash, rhinitis.   Objective:     Vitals:  Temp: 98.1 °F (36.7 °C)  Pulse: 79  Rhythm: normal sinus rhythm  BP: 117/66  MAP (mmHg): 85  Resp: (!) 22  SpO2: 96 %  O2 Device (Oxygen Therapy): room air    Temp  Min: 97 °F (36.1 °C)  Max: 98.7 °F (37.1 °C)  Pulse  Min: 61  Max: 90  BP  Min: 99/53  Max: 151/70  MAP (mmHg)  Min: 74  Max: 101  Resp  Min: 12  Max: 31  SpO2  Min: 92 %  Max: 99 %    07/05 0701 - 07/06 0700  In: 1450.8 [P.O.:200; I.V.:1250.8]  Out: 1250 [Urine:1250]   Unmeasured Output  Stool Occurrence: 0       Physical Exam  GA:  comfortable, no acute distress.   HEENT: No scleral icterus or JVD.   Pulmonary: Clear to auscultation A/L.   Cardiac: RRR S1 & S2 w/o rubs/murmurs/gallops.   Abdominal: Bowel sounds present x 4. No appreciable hepatosplenomegaly.  Skin: No jaundice, rashes, or visible lesions.  Neuro:  --GCS: E4 V5 M6  --Mental Status:  AAOX4, follows all commands, clear speech  --CN II-XII grossly intact.   --Pupils 3mm, PERRL.   --Corneal reflex, gag, cough intact.  --CAMPBELL spont and against gravity    Medications:  Continuous  sodium chloride 0.9% Last Rate: 75 mL/hr at 07/06/19 1201   Scheduled  enoxparin 40 mg Q24H   niMODipine 60 mg Q4H   PRN  acetaminophen 650 mg Q6H PRN   ondansetron 4 mg Q6H PRN   sodium chloride 0.9% 10 mL PRN     Today I personally reviewed pertinent medications, lines/drains/airways, imaging, laboratory results,     Diet  Diet Adult Regular (IDDSI Level 7)      Assessment/Plan:     Neuro  * Nontraumatic subarachnoid hemorrhage from anterior communicating artery  -nontraumatic SAH  -HH2 F2 SAH secondary to ruptured ACOM aneurysm now s/p coiling 7/5  -NSGY following  -daily TCDs  -Nimotop  -SBP<200  -continue IVF  -PT/OT  -neuro checks q 1 hr    Aneurysm of anterior communicating artery  S/p coiling            The patient is being Prophylaxed for:  Venous Thromboembolism with: Chemical  Stress Ulcer with: None  Ventilator  Pneumonia with: not applicable    Activity Orders          Diet Adult Regular (IDDSI Level 7): Regular starting at 07/05 5257        Full Code    Doris Romero NP  Neurocritical Care  [facility]

## 2019-07-06 NOTE — PLAN OF CARE
Problem: Adult Inpatient Plan of Care  Goal: Plan of Care Review  Outcome: Ongoing (interventions implemented as appropriate)  POC reviewed with pt and family at 1400. Pt verbalized understanding. Questions and concerns addressed with spouse and pt and in agreement with POC. No acute events today. NS started @ 75cc/hr. Pt progressing toward goals. Will continue to monitor. See flowsheets for full assessment and VS info.

## 2019-07-06 NOTE — PROGRESS NOTES
[facility]  Vascular Neurology  Comprehensive Stroke Center  Progress Note    Assessment/Plan:     * Nontraumatic subarachnoid hemorrhage from anterior communicating artery  65 yo with PMH significant for breast cancer s/p L mastectomy, admitted with grade II HH aneurysmatic SAH due to likely rupture small anterior communicating artery aneurysm.  Non focal neuro-exam.   Plan for angio and coiling today.  Nimodipine, keppra, euvolemia, nicardipine iv SBP<140, daily TCD.  Will continue to follow.      Aneurysm of anterior communicating artery  S/p coiling- done on 7/4 7/6/19: NAEON. Patient now s/p coiling. Patient reporting improvement in headaches.     STROKE DOCUMENTATION   Acute Stroke Times   Last Known Normal Date: 07/03/19  Last Known Normal Time: (unable to determine)  Symptom Onset Date: 07/03/19  Symptom Onset Time: (unclear)  Stroke Team Called Date: 07/05/19  Stroke Team Called Time: 0820  Stroke Team Arrival Date: 07/05/19  Stroke Team Arrival Time: 0825  CT Interpretation Time: 0825  Decision to Treat Time for Alteplase: (No iv alteplase candidate)  Decision to Treat Time for IR: 0825    NIH Scale:  1a. Level of Consciousness: 0-->Alert, keenly responsive  1b. LOC Questions: 0-->Answers both questions correctly  1c. LOC Commands: 0-->Performs both tasks correctly  2. Best Gaze: 0-->Normal  3. Visual: 0-->No visual loss  4. Facial Palsy: 0-->Normal symmetrical movements  5a. Motor Arm, Left: 0-->No drift, limb holds 90 (or 45) degrees for full 10 secs  5b. Motor Arm, Right: 0-->No drift, limb holds 90 (or 45) degrees for full 10 secs  6a. Motor Leg, Left: 0-->No drift, leg holds 30 degree position for full 5 secs  6b. Motor Leg, Right: 0-->No drift, leg holds 30 degree position for full 5 secs  7. Limb Ataxia: 1-->Present in one limb  8. Sensory: 0-->Normal, no sensory loss  9. Best Language: 0-->No aphasia, normal  10. Dysarthria: 0-->Normal  11. Extinction and Inattention  (formerly Neglect): 0-->No abnormality  Total (NIH Stroke Scale): 1       Modified Lathrop Score: 0  Troy Coma Scale:    ABCD2 Score:    [id number] Score:   HAS -BLED Score:   ICH Score:0  Hunt & Rucker Classification:Grade II     Hemorrhagic change of an Ischemic Stroke: Does this patient have an ischemic stroke with hemorrhagic changes? No     Neurologic Chief Complaint: Headache- aneurysmal SAH s/p coiling    Subjective:     Interval History: Patient is seen for follow-up neurological assessment and treatment recommendations: ALEXIS. Patient now s/p coiling. Patient reporting improvement in headaches.     HPI, Past Medical, Family, and Social History remains the same as documented in the initial encounter.     Review of Systems   Constitutional: Negative for fever.   HENT: Negative for trouble swallowing.    Eyes: Negative for visual disturbance.   Respiratory: Negative for shortness of breath and wheezing.    Cardiovascular: Negative for chest pain and palpitations.   Gastrointestinal: Positive for constipation. Negative for diarrhea, nausea and vomiting.   Musculoskeletal: Positive for gait problem.   Neurological: Positive for dizziness and headaches. Negative for speech difficulty, weakness and numbness.   Psychiatric/Behavioral: Negative for confusion and decreased concentration.     Scheduled Meds:   enoxparin  40 mg Subcutaneous Q24H    niMODipine  60 mg Oral Q4H     Continuous Infusions:   sodium chloride 0.9% 75 mL/hr at 07/06/19 1401     PRN Meds:acetaminophen, Dextrose 10% Bolus, Dextrose 10% Bolus, glucagon (human recombinant), glucose, glucose, insulin aspart U-100, ondansetron, sodium chloride 0.9%    Objective:     Vital Signs (Most Recent):  Temp: 98.1 °F (36.7 °C) (07/06/19 1101)  Pulse: 75 (07/06/19 1401)  Resp: (!) 22 (07/06/19 1401)  BP: (!) 115/55 (07/06/19 1301)  SpO2: 96 % (07/06/19 1401)  BP Location: Right arm    Vital Signs Range (Last 24H):  Temp:  [97.4 °F (36.3 °C)-98.7 °F (37.1  °C)]   Pulse:  [61-90]   Resp:  [12-31]   BP: ()/(52-77)   SpO2:  [94 %-99 %]   Arterial Line BP: ()/(47-69)   BP Location: Right arm    Physical Exam   Constitutional: Vital signs are normal. She appears well-developed and well-nourished.   HENT:   Head: Normocephalic and atraumatic.   Right Ear: External ear normal.   Left Ear: External ear normal.   Nose: Nose normal.   Mouth/Throat: Uvula is midline, oropharynx is clear and moist and mucous membranes are normal.   Eyes: Pupils are equal, round, and reactive to light. Conjunctivae and EOM are normal.   Cardiovascular: Normal rate.   Pulmonary/Chest: Effort normal.   Musculoskeletal: Normal range of motion.   Neurological: She has normal strength. No cranial nerve deficit or sensory deficit. GCS eye subscore is 4. GCS verbal subscore is 5. GCS motor subscore is 6.   Skin: Skin is warm and dry.   Right groin site dressing with dry drainage   Psychiatric: She has a normal mood and affect. Her speech is normal and behavior is normal. Thought content normal.       Neurological Exam:   LOC: alert  Attention Span: Good   Language: No aphasia  Articulation: No dysarthria  Orientation: Person, Place, Time   Visual Fields: Full  EOM (CN III, IV, VI): Full/intact  Pupils (CN II, III): PERRL  Facial Sensation (CN V): Normal  Facial Movement (CN VII): Symmetric facial expression    Motor: Arm left  Normal 5/5  Leg left  Normal 5/5  Arm right  Normal 5/5  Leg right Normal 5/5  Cebellar: Upper Extremity Appendicular Ataxia (Finger Nose Finger)  Left  Sensation: Intact to light touch, temperature and vibration  Tone: Normal tone throughout    Laboratory:  CMP:   Recent Labs   Lab 07/06/19  0115   CALCIUM 9.5   ALBUMIN 3.6   PROT 6.9      K 3.7   CO2 23      BUN 14   CREATININE 0.8   ALKPHOS 101   ALT 11   AST 12   BILITOT 0.5     BMP:   Recent Labs   Lab 07/06/19  0115      K 3.7      CO2 23   BUN 14   CREATININE 0.8   CALCIUM 9.5     CBC:    Recent Labs   Lab 07/06/19  0115   WBC 9.42   RBC 4.42   HGB 12.1   HCT 39.2      MCV 89   MCH 27.4   MCHC 30.9*     Lipid Panel:   Recent Labs   Lab 07/05/19  0110   CHOL 256*   LDLCALC 179.2*   HDL 55   TRIG 109     Coagulation:   Recent Labs   Lab 07/04/19  1104   INR 1.1   APTT 33.0*     Platelet Aggregation Study: No results for input(s): PLTAGG, PLTAGINTERP, PLTAGREGLACO, ADPPLTAGGREG in the last 168 hours.  Hgb A1C:   Recent Labs   Lab 07/05/19  0110   HGBA1C 5.9*     TSH: No results for input(s): TSH in the last 168 hours.    Diagnostic Results     Brain Imaging     CT head w/o contrast 07/04/2019  1. Subarachnoid hemorrhage.  In the setting of no trauma suspect aneurysm    Vessel Imaging     IR angio 07/05/2019  1. 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    2.  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    3.  Distal embolism likely from the access catheter into the distal branches of the anterior middle cerebral artery on the left side treated successfully with intra-arterial Integrilin    CTA head 07/04/2019  Small anterior communicating artery aneurysm likely the origin of the recent subarachnoid bleed.    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

## 2019-07-06 NOTE — HOSPITAL COURSE
7/6/19: NAEON. Patient now s/p coiling. Patient reporting improvement in headaches.   7/719: Neuro exam stable. Patient c/o difficulty sleeping and headache overnight. TCDs show increased velocities. Patient to continue PRN tylenol for HA and start ramelteon for sleep tonight.   07/08/2019 neuro exam states stable, complains of some non-constant headache worsening with moving. Some pain with lateral gaze.   07/11/2019 repeat HCT shows resolved SAH, no vasospasm, no hydrocephalus.  7/12: Neuro exam normal. No headache this AM. Patient states headache was severe yesterday afternoon. TCD from today pending.   7/17: NAEON. Vasospasm watch continued; TCD on 7/16 shows mild - moderate L CAROLINA vasospasm, slightly improved from 7/15. Headaches improving per patient.   7/18: continued vasospasm, last TCD with some improvement in CAROLINA vasospasm, patient currently on IVF, denies new neurologic changes

## 2019-07-06 NOTE — SUBJECTIVE & OBJECTIVE
Interval History: 7/6 PBD 3, NAEON, AFVSS, Exam stable    Medications:  Continuous Infusions:   sodium chloride 0.9% 75 mL/hr at 07/06/19 1201     Scheduled Meds:   enoxparin  40 mg Subcutaneous Q24H    niMODipine  60 mg Oral Q4H     PRN Meds:acetaminophen, ondansetron, sodium chloride 0.9%     Review of Systems    Objective:     Weight: 70 kg (154 lb 5.2 oz)  Body mass index is 25.68 kg/m².  Vital Signs (Most Recent):  Temp: 98.1 °F (36.7 °C) (07/06/19 1101)  Pulse: 79 (07/06/19 1201)  Resp: (!) 22 (07/06/19 1201)  BP: 117/66 (07/06/19 1201)  SpO2: 96 % (07/06/19 1201) Vital Signs (24h Range):  Temp:  [97 °F (36.1 °C)-98.7 °F (37.1 °C)] 98.1 °F (36.7 °C)  Pulse:  [61-90] 79  Resp:  [12-31] 22  SpO2:  [92 %-99 %] 96 %  BP: ()/(52-77) 117/66  Arterial Line BP: (104-164)/(47-69) 110/52     Date 07/06/19 0700 - 07/07/19 0659   Shift 0456-0665 1930-7234 4475-7420 24 Hour Total   INTAKE   I.V.(mL/kg) 181.3(2.6)   181.3(2.6)   Shift Total(mL/kg) 181.3(2.6)   181.3(2.6)   OUTPUT   Shift Total(mL/kg)       Weight (kg) 70 70 70 70                   Female External Urinary Catheter 07/04/19 1806 (Active)   Skin no redness;no breakdown 7/5/2019  3:05 AM   Tolerance no signs/symptoms of discomfort 7/5/2019  3:05 AM   Suction Continuous suction at 60 mmHg 7/4/2019  7:05 PM   Date of last wick change 07/04/19 7/4/2019  5:40 PM   Time of last wick change 1806 7/4/2019  5:40 PM   Output (mL) 300 mL 7/5/2019  5:05 AM       Neurosurgery Physical Exam  General: AOx3, GCS E4V5M6  CNII-XII: Intact on fine exam, PERRL, EOMi, facial sensation preserved, no facial assymetry, tongue/uvula/palate midline, shoulder shrug equal, no pronator drift  Extremities: 5/5 motor throughout, sensorium intact throughout, coordination intact throughout, DTRs 2+, no pathological reflexes    Significant Labs:  Recent Labs   Lab 07/05/19  0110 07/06/19  0115    147*    142   K 4.5 3.7    108   CO2 16* 23   BUN 11 14   CREATININE  0.7 0.8   CALCIUM 9.2 9.5   MG 2.1 2.4     Recent Labs   Lab 07/05/19  0110 07/06/19  0115   WBC 8.91 9.42   HGB 12.0 12.1   HCT 40.1 39.2    259     No results for input(s): LABPT, INR, APTT in the last 48 hours.  Microbiology Results (last 7 days)     ** No results found for the last 168 hours. **        Significant Diagnostics:  All significant diagnostics reviewed

## 2019-07-06 NOTE — SUBJECTIVE & OBJECTIVE
Review of Systems   Constitutional: Denies fevers, weight loss, chills, or weakness.  Eyes: Denies changes in vision.  ENT: Denies dysphagia, nasal discharge, ear pain or discharge.  Cardiovascular: Denies chest pain, palpitations, orthopnea, or claudication.  Respiratory: Denies shortness of breath, cough, hemoptysis, or wheezing.  GI: Denies nausea/vomitting, hematochezia, melena, abd pain, or changes in appetite.  : Denies dysuria, incontinence, or hematuria.  Musculoskeletal: Denies joint pain or myalgias.  Skin/breast: Denies rashes, lumps, lesions, or discharge.  Neurologic: Denies dizziness, vertigo, or paresthesias. Complains of headache  Psychiatric: Denies changes in mood or hallucinations.  Endocrine: Denies polyuria, polydipsia, heat/cold intolerance.  Hematologic/Lymph: Denies lymphadenopathy, easy bruising or easy bleeding.  Allergic/Immunologic: Denies rash, rhinitis.   Objective:     Vitals:  Temp: 98.1 °F (36.7 °C)  Pulse: 79  Rhythm: normal sinus rhythm  BP: 117/66  MAP (mmHg): 85  Resp: (!) 22  SpO2: 96 %  O2 Device (Oxygen Therapy): room air    Temp  Min: 97 °F (36.1 °C)  Max: 98.7 °F (37.1 °C)  Pulse  Min: 61  Max: 90  BP  Min: 99/53  Max: 151/70  MAP (mmHg)  Min: 74  Max: 101  Resp  Min: 12  Max: 31  SpO2  Min: 92 %  Max: 99 %    07/05 0701 - 07/06 0700  In: 1450.8 [P.O.:200; I.V.:1250.8]  Out: 1250 [Urine:1250]   Unmeasured Output  Stool Occurrence: 0       Physical Exam  GA:  comfortable, no acute distress.   HEENT: No scleral icterus or JVD.   Pulmonary: Clear to auscultation A/L.   Cardiac: RRR S1 & S2 w/o rubs/murmurs/gallops.   Abdominal: Bowel sounds present x 4. No appreciable hepatosplenomegaly.  Skin: No jaundice, rashes, or visible lesions.  Neuro:  --GCS: E4 V5 M6  --Mental Status:  AAOX4, follows all commands, clear speech  --CN II-XII grossly intact.   --Pupils 3mm, PERRL.   --Corneal reflex, gag, cough intact.  --CAMPBELL spont and against  gravity    Medications:  Continuous  sodium chloride 0.9% Last Rate: 75 mL/hr at 07/06/19 1201   Scheduled  enoxparin 40 mg Q24H   niMODipine 60 mg Q4H   PRN  acetaminophen 650 mg Q6H PRN   ondansetron 4 mg Q6H PRN   sodium chloride 0.9% 10 mL PRN     Today I personally reviewed pertinent medications, lines/drains/airways, imaging, laboratory results,     Diet  Diet Adult Regular (IDDSI Level 7)

## 2019-07-06 NOTE — ASSESSMENT & PLAN NOTE
-nontraumatic SAH  -HH2 F2 SAH secondary to ruptured ACOM aneurysm now s/p coiling 7/5  -NSGY following  -daily TCDs  -Nimotop  -SBP<200  -continue IVF  -PT/OT  -neuro checks q 1 hr

## 2019-07-06 NOTE — PLAN OF CARE
Problem: Adult Inpatient Plan of Care  Goal: Plan of Care Review  Outcome: Ongoing (interventions implemented as appropriate)  POC reviewed with pt and pt's  at 0400. Pt verbalized understanding. Questions and concerns addressed. Pt s/p aneurysm coiling, no acute events overnight. SBP goal less than 160. Cardene gtt off. Pt progressing toward goals. Will continue to monitor. See flowsheets for full assessment and VS info

## 2019-07-06 NOTE — SUBJECTIVE & OBJECTIVE
Neurologic Chief Complaint: Headache- aneurysmal SAH s/p coiling    Subjective:     Interval History: Patient is seen for follow-up neurological assessment and treatment recommendations: ALEXIS. Patient now s/p coiling. Patient reporting improvement in headaches.     HPI, Past Medical, Family, and Social History remains the same as documented in the initial encounter.     Review of Systems   Constitutional: Negative for fever.   HENT: Negative for trouble swallowing.    Eyes: Negative for visual disturbance.   Respiratory: Negative for shortness of breath and wheezing.    Cardiovascular: Negative for chest pain and palpitations.   Gastrointestinal: Positive for constipation. Negative for diarrhea, nausea and vomiting.   Musculoskeletal: Positive for gait problem.   Neurological: Positive for dizziness and headaches. Negative for speech difficulty, weakness and numbness.   Psychiatric/Behavioral: Negative for confusion and decreased concentration.     Scheduled Meds:   enoxparin  40 mg Subcutaneous Q24H    niMODipine  60 mg Oral Q4H     Continuous Infusions:   sodium chloride 0.9% 75 mL/hr at 07/06/19 1401     PRN Meds:acetaminophen, Dextrose 10% Bolus, Dextrose 10% Bolus, glucagon (human recombinant), glucose, glucose, insulin aspart U-100, ondansetron, sodium chloride 0.9%    Objective:     Vital Signs (Most Recent):  Temp: 98.1 °F (36.7 °C) (07/06/19 1101)  Pulse: 75 (07/06/19 1401)  Resp: (!) 22 (07/06/19 1401)  BP: (!) 115/55 (07/06/19 1301)  SpO2: 96 % (07/06/19 1401)  BP Location: Right arm    Vital Signs Range (Last 24H):  Temp:  [97.4 °F (36.3 °C)-98.7 °F (37.1 °C)]   Pulse:  [61-90]   Resp:  [12-31]   BP: ()/(52-77)   SpO2:  [94 %-99 %]   Arterial Line BP: ()/(47-69)   BP Location: Right arm    Physical Exam   Constitutional: Vital signs are normal. She appears well-developed and well-nourished.   HENT:   Head: Normocephalic and atraumatic.   Right Ear: External ear normal.   Left Ear:  External ear normal.   Nose: Nose normal.   Mouth/Throat: Uvula is midline, oropharynx is clear and moist and mucous membranes are normal.   Eyes: Pupils are equal, round, and reactive to light. Conjunctivae and EOM are normal.   Cardiovascular: Normal rate.   Pulmonary/Chest: Effort normal.   Musculoskeletal: Normal range of motion.   Neurological: She has normal strength. No cranial nerve deficit or sensory deficit. GCS eye subscore is 4. GCS verbal subscore is 5. GCS motor subscore is 6.   Skin: Skin is warm and dry.   Right groin site dressing with dry drainage   Psychiatric: She has a normal mood and affect. Her speech is normal and behavior is normal. Thought content normal.       Neurological Exam:   LOC: alert  Attention Span: Good   Language: No aphasia  Articulation: No dysarthria  Orientation: Person, Place, Time   Visual Fields: Full  EOM (CN III, IV, VI): Full/intact  Pupils (CN II, III): PERRL  Facial Sensation (CN V): Normal  Facial Movement (CN VII): Symmetric facial expression    Motor: Arm left  Normal 5/5  Leg left  Normal 5/5  Arm right  Normal 5/5  Leg right Normal 5/5  Cebellar: Upper Extremity Appendicular Ataxia (Finger Nose Finger)  Left  Sensation: Intact to light touch, temperature and vibration  Tone: Normal tone throughout    Laboratory:  CMP:   Recent Labs   Lab 07/06/19  0115   CALCIUM 9.5   ALBUMIN 3.6   PROT 6.9      K 3.7   CO2 23      BUN 14   CREATININE 0.8   ALKPHOS 101   ALT 11   AST 12   BILITOT 0.5     BMP:   Recent Labs   Lab 07/06/19  0115      K 3.7      CO2 23   BUN 14   CREATININE 0.8   CALCIUM 9.5     CBC:   Recent Labs   Lab 07/06/19  0115   WBC 9.42   RBC 4.42   HGB 12.1   HCT 39.2      MCV 89   MCH 27.4   MCHC 30.9*     Lipid Panel:   Recent Labs   Lab 07/05/19  0110   CHOL 256*   LDLCALC 179.2*   HDL 55   TRIG 109     Coagulation:   Recent Labs   Lab 07/04/19  1104   INR 1.1   APTT 33.0*     Platelet Aggregation Study: No results for  input(s): PLTAGG, PLTAGINTERP, PLTAGREGLACO, ADPPLTAGGREG in the last 168 hours.  Hgb A1C:   Recent Labs   Lab 07/05/19  0110   HGBA1C 5.9*     TSH: No results for input(s): TSH in the last 168 hours.    Diagnostic Results     Brain Imaging     CT head w/o contrast 07/04/2019  1. Subarachnoid hemorrhage.  In the setting of no trauma suspect aneurysm    Vessel Imaging     IR angio 07/05/2019  1. 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    2.  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    3.  Distal embolism likely from the access catheter into the distal branches of the anterior middle cerebral artery on the left side treated successfully with intra-arterial Integrilin    CTA head 07/04/2019  Small anterior communicating artery aneurysm likely the origin of the recent subarachnoid bleed.    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

## 2019-07-07 LAB
ALBUMIN SERPL BCP-MCNC: 3.2 G/DL (ref 3.5–5.2)
ALP SERPL-CCNC: 82 U/L (ref 55–135)
ALT SERPL W/O P-5'-P-CCNC: 11 U/L (ref 10–44)
ANION GAP SERPL CALC-SCNC: 10 MMOL/L (ref 8–16)
ASCENDING AORTA: 2.56 CM
AST SERPL-CCNC: 14 U/L (ref 10–40)
AV INDEX (PROSTH): 0.74
AV MEAN GRADIENT: 5 MMHG
AV PEAK GRADIENT: 8 MMHG
AV VALVE AREA: 2.29 CM2
AV VELOCITY RATIO: 0.74
BASOPHILS # BLD AUTO: 0.02 K/UL (ref 0–0.2)
BASOPHILS NFR BLD: 0.2 % (ref 0–1.9)
BILIRUB SERPL-MCNC: 0.3 MG/DL (ref 0.1–1)
BSA FOR ECHO PROCEDURE: 1.79 M2
BUN SERPL-MCNC: 23 MG/DL (ref 8–23)
CALCIUM SERPL-MCNC: 8.7 MG/DL (ref 8.7–10.5)
CHLORIDE SERPL-SCNC: 111 MMOL/L (ref 95–110)
CO2 SERPL-SCNC: 23 MMOL/L (ref 23–29)
CREAT SERPL-MCNC: 0.8 MG/DL (ref 0.5–1.4)
CV ECHO LV RWT: 0.47 CM
DIFFERENTIAL METHOD: ABNORMAL
DOP CALC AO PEAK VEL: 1.38 M/S
DOP CALC AO VTI: 31.48 CM
DOP CALC LVOT AREA: 3.1 CM2
DOP CALC LVOT DIAMETER: 1.99 CM
DOP CALC LVOT PEAK VEL: 1.02 M/S
DOP CALC LVOT STROKE VOLUME: 72.06 CM3
DOP CALCLVOT PEAK VEL VTI: 23.18 CM
E WAVE DECELERATION TIME: 220.39 MSEC
E/A RATIO: 1.07
E/E' RATIO: 7.6 M/S
ECHO LV POSTERIOR WALL: 0.94 CM (ref 0.6–1.1)
EOSINOPHIL # BLD AUTO: 0 K/UL (ref 0–0.5)
EOSINOPHIL NFR BLD: 0 % (ref 0–8)
ERYTHROCYTE [DISTWIDTH] IN BLOOD BY AUTOMATED COUNT: 13.6 % (ref 11.5–14.5)
EST. GFR  (AFRICAN AMERICAN): >60 ML/MIN/1.73 M^2
EST. GFR  (NON AFRICAN AMERICAN): >60 ML/MIN/1.73 M^2
FRACTIONAL SHORTENING: 35 % (ref 28–44)
GLUCOSE SERPL-MCNC: 111 MG/DL (ref 70–110)
HCT VFR BLD AUTO: 35.3 % (ref 37–48.5)
HGB BLD-MCNC: 11 G/DL (ref 12–16)
IMM GRANULOCYTES # BLD AUTO: 0.11 K/UL (ref 0–0.04)
IMM GRANULOCYTES NFR BLD AUTO: 0.8 % (ref 0–0.5)
INTERVENTRICULAR SEPTUM: 0.84 CM (ref 0.6–1.1)
IVRT: 0.09 MSEC
LA MAJOR: 5.65 CM
LA MINOR: 5.59 CM
LA WIDTH: 3.47 CM
LEFT ATRIUM SIZE: 3.52 CM
LEFT ATRIUM VOLUME INDEX: 33 ML/M2
LEFT ATRIUM VOLUME: 58.35 CM3
LEFT INTERNAL DIMENSION IN SYSTOLE: 2.6 CM (ref 2.1–4)
LEFT VENTRICLE DIASTOLIC VOLUME INDEX: 39.82 ML/M2
LEFT VENTRICLE DIASTOLIC VOLUME: 70.48 ML
LEFT VENTRICLE MASS INDEX: 61 G/M2
LEFT VENTRICLE SYSTOLIC VOLUME INDEX: 13.9 ML/M2
LEFT VENTRICLE SYSTOLIC VOLUME: 24.69 ML
LEFT VENTRICULAR INTERNAL DIMENSION IN DIASTOLE: 4.01 CM (ref 3.5–6)
LEFT VENTRICULAR MASS: 108.45 G
LV LATERAL E/E' RATIO: 6.91 M/S
LV SEPTAL E/E' RATIO: 8.44 M/S
LYMPHOCYTES # BLD AUTO: 2 K/UL (ref 1–4.8)
LYMPHOCYTES NFR BLD: 15.7 % (ref 18–48)
MAGNESIUM SERPL-MCNC: 2.2 MG/DL (ref 1.6–2.6)
MCH RBC QN AUTO: 27.3 PG (ref 27–31)
MCHC RBC AUTO-ENTMCNC: 31.2 G/DL (ref 32–36)
MCV RBC AUTO: 88 FL (ref 82–98)
MONOCYTES # BLD AUTO: 1.1 K/UL (ref 0.3–1)
MONOCYTES NFR BLD: 8.6 % (ref 4–15)
MV PEAK A VEL: 0.71 M/S
MV PEAK E VEL: 0.76 M/S
NEUTROPHILS # BLD AUTO: 9.7 K/UL (ref 1.8–7.7)
NEUTROPHILS NFR BLD: 74.7 % (ref 38–73)
NRBC BLD-RTO: 0 /100 WBC
PHOSPHATE SERPL-MCNC: 3.2 MG/DL (ref 2.7–4.5)
PISA TR MAX VEL: 2.19 M/S
PLATELET # BLD AUTO: 264 K/UL (ref 150–350)
PMV BLD AUTO: 9.4 FL (ref 9.2–12.9)
POCT GLUCOSE: 112 MG/DL (ref 70–110)
POCT GLUCOSE: 128 MG/DL (ref 70–110)
POTASSIUM SERPL-SCNC: 3.7 MMOL/L (ref 3.5–5.1)
PROT SERPL-MCNC: 6.1 G/DL (ref 6–8.4)
PULM VEIN S/D RATIO: 1.65
PV PEAK D VEL: 0.26 M/S
PV PEAK S VEL: 0.43 M/S
RA MAJOR: 4.38 CM
RA PRESSURE: 3 MMHG
RA WIDTH: 3.35 CM
RBC # BLD AUTO: 4.03 M/UL (ref 4–5.4)
RIGHT VENTRICULAR END-DIASTOLIC DIMENSION: 3.34 CM
RV TISSUE DOPPLER FREE WALL SYSTOLIC VELOCITY 1 (APICAL 4 CHAMBER VIEW): 12.65 CM/S
SINUS: 2.54 CM
SODIUM SERPL-SCNC: 144 MMOL/L (ref 136–145)
STJ: 2.35 CM
TDI LATERAL: 0.11 M/S
TDI SEPTAL: 0.09 M/S
TDI: 0.1 M/S
TR MAX PG: 19 MMHG
TRICUSPID ANNULAR PLANE SYSTOLIC EXCURSION: 2.57 CM
TV REST PULMONARY ARTERY PRESSURE: 22 MMHG
WBC # BLD AUTO: 12.97 K/UL (ref 3.9–12.7)

## 2019-07-07 PROCEDURE — 25000003 PHARM REV CODE 250: Performed by: PSYCHIATRY & NEUROLOGY

## 2019-07-07 PROCEDURE — 99233 SBSQ HOSP IP/OBS HIGH 50: CPT | Mod: ,,, | Performed by: PSYCHIATRY & NEUROLOGY

## 2019-07-07 PROCEDURE — 84100 ASSAY OF PHOSPHORUS: CPT

## 2019-07-07 PROCEDURE — 25000003 PHARM REV CODE 250: Performed by: STUDENT IN AN ORGANIZED HEALTH CARE EDUCATION/TRAINING PROGRAM

## 2019-07-07 PROCEDURE — 99233 PR SUBSEQUENT HOSPITAL CARE,LEVL III: ICD-10-PCS | Mod: ,,, | Performed by: PSYCHIATRY & NEUROLOGY

## 2019-07-07 PROCEDURE — 85025 COMPLETE CBC W/AUTO DIFF WBC: CPT

## 2019-07-07 PROCEDURE — 20000000 HC ICU ROOM

## 2019-07-07 PROCEDURE — 83735 ASSAY OF MAGNESIUM: CPT

## 2019-07-07 PROCEDURE — 80053 COMPREHEN METABOLIC PANEL: CPT

## 2019-07-07 PROCEDURE — 63600175 PHARM REV CODE 636 W HCPCS: Performed by: NURSE PRACTITIONER

## 2019-07-07 PROCEDURE — 97165 OT EVAL LOW COMPLEX 30 MIN: CPT

## 2019-07-07 RX ORDER — RAMELTEON 8 MG/1
8 TABLET ORAL NIGHTLY PRN
Status: DISCONTINUED | OUTPATIENT
Start: 2019-07-07 | End: 2019-07-15

## 2019-07-07 RX ORDER — SODIUM CHLORIDE, SODIUM LACTATE, POTASSIUM CHLORIDE, CALCIUM CHLORIDE 600; 310; 30; 20 MG/100ML; MG/100ML; MG/100ML; MG/100ML
INJECTION, SOLUTION INTRAVENOUS CONTINUOUS
Status: DISCONTINUED | OUTPATIENT
Start: 2019-07-07 | End: 2019-07-18

## 2019-07-07 RX ORDER — PANTOPRAZOLE SODIUM 40 MG/1
40 TABLET, DELAYED RELEASE ORAL 2 TIMES DAILY
Status: DISCONTINUED | OUTPATIENT
Start: 2019-07-07 | End: 2019-07-18 | Stop reason: HOSPADM

## 2019-07-07 RX ORDER — SIMETHICONE 80 MG
1 TABLET,CHEWABLE ORAL 3 TIMES DAILY PRN
Status: DISCONTINUED | OUTPATIENT
Start: 2019-07-07 | End: 2019-07-18 | Stop reason: HOSPADM

## 2019-07-07 RX ORDER — PANTOPRAZOLE SODIUM 40 MG/1
40 TABLET, DELAYED RELEASE ORAL DAILY
Status: DISCONTINUED | OUTPATIENT
Start: 2019-07-07 | End: 2019-07-07

## 2019-07-07 RX ORDER — PANTOPRAZOLE SODIUM 40 MG/1
40 TABLET, DELAYED RELEASE ORAL
Status: DISCONTINUED | OUTPATIENT
Start: 2019-07-07 | End: 2019-07-07

## 2019-07-07 RX ORDER — MONTELUKAST SODIUM 10 MG/1
10 TABLET ORAL DAILY
Status: DISCONTINUED | OUTPATIENT
Start: 2019-07-07 | End: 2019-07-18 | Stop reason: HOSPADM

## 2019-07-07 RX ADMIN — ACETAMINOPHEN 650 MG: 325 TABLET ORAL at 04:07

## 2019-07-07 RX ADMIN — NIMODIPINE 60 MG: 30 CAPSULE, LIQUID FILLED ORAL at 01:07

## 2019-07-07 RX ADMIN — MONTELUKAST SODIUM 10 MG: 10 TABLET, COATED ORAL at 10:07

## 2019-07-07 RX ADMIN — NIMODIPINE 60 MG: 30 CAPSULE, LIQUID FILLED ORAL at 10:07

## 2019-07-07 RX ADMIN — ACETAMINOPHEN 650 MG: 325 TABLET ORAL at 09:07

## 2019-07-07 RX ADMIN — PANTOPRAZOLE SODIUM 40 MG: 40 TABLET, DELAYED RELEASE ORAL at 09:07

## 2019-07-07 RX ADMIN — ACETAMINOPHEN 650 MG: 325 TABLET ORAL at 10:07

## 2019-07-07 RX ADMIN — NIMODIPINE 60 MG: 30 CAPSULE, LIQUID FILLED ORAL at 03:07

## 2019-07-07 RX ADMIN — ENOXAPARIN SODIUM 40 MG: 100 INJECTION SUBCUTANEOUS at 04:07

## 2019-07-07 RX ADMIN — PANTOPRAZOLE SODIUM 40 MG: 40 TABLET, DELAYED RELEASE ORAL at 10:07

## 2019-07-07 RX ADMIN — NIMODIPINE 60 MG: 30 CAPSULE, LIQUID FILLED ORAL at 09:07

## 2019-07-07 RX ADMIN — SODIUM CHLORIDE, SODIUM LACTATE, POTASSIUM CHLORIDE, AND CALCIUM CHLORIDE: 600; 310; 30; 20 INJECTION, SOLUTION INTRAVENOUS at 11:07

## 2019-07-07 RX ADMIN — NIMODIPINE 60 MG: 30 CAPSULE, LIQUID FILLED ORAL at 05:07

## 2019-07-07 RX ADMIN — RAMELTEON 8 MG: 8 TABLET, FILM COATED ORAL at 09:07

## 2019-07-07 RX ADMIN — ACETAMINOPHEN 650 MG: 325 TABLET ORAL at 02:07

## 2019-07-07 NOTE — ASSESSMENT & PLAN NOTE
63 yo with hx of breast cancer presents to the ED with complaints of intractable headache after a barium CT abdomen on 7/2. CT head on 7/4 with diffuse SAH within basal cisterns and midline- [facility] was consulted. CTA on 7/4 revealed small anterior communicating artery aneurysm-. She was taken to IR on 7/5 and is now s/p coiling. Patient is admitted to [facility] for close monitoring.     Antithrombotics: none SAH  Statins: none SAH  Aggressive risk factor modification:   Rehab efforts: PT/OT/SLP to eval and treat.   Diagnostics ordered/pending: daily TCDs  VTE prophylaxis: lovenox  BP parameters: secured aneurysm- keep SBP <200

## 2019-07-07 NOTE — SUBJECTIVE & OBJECTIVE
Neurologic Chief Complaint: Headache- aneurysmal SAH s/p coiling    Subjective:     Interval History: Patient is seen for follow-up neurological assessment and treatment recommendations: Neuro exam stable. Patient c/o difficulty sleeping and headache overnight. TCDs show increased velocities. Patient to continue PRN tylenol for HA and start ramelteon for sleep tonight.     HPI, Past Medical, Family, and Social History remains the same as documented in the initial encounter.     Review of Systems   Constitutional: Negative for fever.   HENT: Negative for trouble swallowing.    Eyes: Negative for visual disturbance.   Respiratory: Negative for shortness of breath and wheezing.    Cardiovascular: Negative for chest pain and palpitations.   Gastrointestinal: Positive for constipation. Negative for diarrhea, nausea and vomiting.   Musculoskeletal: Positive for gait problem.   Neurological: Positive for dizziness and headaches. Negative for speech difficulty, weakness and numbness.   Psychiatric/Behavioral: Positive for sleep disturbance. Negative for confusion and decreased concentration.     Scheduled Meds:   enoxparin  40 mg Subcutaneous Q24H    montelukast  10 mg Oral Daily    niMODipine  60 mg Oral Q4H    pantoprazole  40 mg Oral BID     Continuous Infusions:   lactated ringers 75 mL/hr at 07/07/19 1500     PRN Meds:acetaminophen, Dextrose 10% Bolus, Dextrose 10% Bolus, glucagon (human recombinant), glucose, glucose, insulin aspart U-100, ondansetron, ramelteon, simethicone, sodium chloride 0.9%    Objective:     Vital Signs (Most Recent):  Temp: 98.5 °F (36.9 °C) (07/07/19 1500)  Pulse: 71 (07/07/19 1500)  Resp: 18 (07/07/19 1500)  BP: (!) 153/70 (07/07/19 1500)  SpO2: 97 % (07/07/19 1500)  BP Location: Right arm    Vital Signs Range (Last 24H):  Temp:  [98.1 °F (36.7 °C)-98.6 °F (37 °C)]   Pulse:  [65-79]   Resp:  [12-25]   BP: (110-175)/(56-80)   SpO2:  [94 %-98 %]   Arterial Line BP: ()/(56-82)   BP  Location: Right arm    Physical Exam   Constitutional: Vital signs are normal. She appears well-developed and well-nourished.   HENT:   Head: Normocephalic and atraumatic.   Right Ear: External ear normal.   Left Ear: External ear normal.   Nose: Nose normal.   Mouth/Throat: Uvula is midline, oropharynx is clear and moist and mucous membranes are normal.   Eyes: Pupils are equal, round, and reactive to light. Conjunctivae and EOM are normal.   Cardiovascular: Normal rate.   Pulmonary/Chest: Effort normal.   Musculoskeletal: Normal range of motion.   Neurological: She has normal strength. No cranial nerve deficit or sensory deficit. GCS eye subscore is 4. GCS verbal subscore is 5. GCS motor subscore is 6.   Skin: Skin is warm and dry.   Psychiatric: She has a normal mood and affect. Her speech is normal and behavior is normal. Thought content normal.       Neurological Exam:   LOC: alert  Attention Span: Good   Language: No aphasia  Articulation: No dysarthria  Orientation: Person, Place, Time   Visual Fields: Full  EOM (CN III, IV, VI): Full/intact  Pupils (CN II, III): PERRL  Facial Sensation (CN V): Normal  Facial Movement (CN VII): Symmetric facial expression    Motor: Arm left  Normal 5/5  Leg left  Normal 5/5  Arm right  Normal 5/5  Leg right Normal 5/5  Cebellar: Upper Extremity Appendicular Ataxia (Finger Nose Finger)  Left  Sensation: Intact to light touch, temperature and vibration  Tone: Normal tone throughout    Laboratory:  CMP:   Recent Labs   Lab 07/07/19 0201   CALCIUM 8.7   ALBUMIN 3.2*   PROT 6.1      K 3.7   CO2 23   *   BUN 23   CREATININE 0.8   ALKPHOS 82   ALT 11   AST 14   BILITOT 0.3     BMP:   Recent Labs   Lab 07/07/19 0201      K 3.7   *   CO2 23   BUN 23   CREATININE 0.8   CALCIUM 8.7     CBC:   Recent Labs   Lab 07/07/19 0201   WBC 12.97*   RBC 4.03   HGB 11.0*   HCT 35.3*      MCV 88   MCH 27.3   MCHC 31.2*     Lipid Panel:   Recent Labs   Lab  07/05/19  0110   CHOL 256*   LDLCALC 179.2*   HDL 55   TRIG 109     Coagulation:   Recent Labs   Lab 07/04/19  1104   INR 1.1   APTT 33.0*     Platelet Aggregation Study: No results for input(s): PLTAGG, PLTAGINTERP, PLTAGREGLACO, ADPPLTAGGREG in the last 168 hours.  Hgb A1C:   Recent Labs   Lab 07/05/19  0110   HGBA1C 5.9*     TSH: No results for input(s): TSH in the last 168 hours.    Diagnostic Results     Brain Imaging     CT head w/o contrast 07/04/2019  1. Subarachnoid hemorrhage.  In the setting of no trauma suspect aneurysm    Vessel Imaging     IR angio 07/05/2019  1. 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    2.  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    3.  Distal embolism likely from the access catheter into the distal branches of the anterior middle cerebral artery on the left side treated successfully with intra-arterial Integrilin    CTA head 07/04/2019  Small anterior communicating artery aneurysm likely the origin of the recent subarachnoid bleed.    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 The estimated ejection fraction is 63%  · Concentric left ventricular remodeling.  · No wall motion abnormalities.  · Normal LV diastolic function.  · Normal right ventricular systolic function.  · Mild tricuspid regurgitation.  · Normal central venous pressure (3 mm Hg).  · The estimated PA systolic pressure is 22 mm Hg

## 2019-07-07 NOTE — PLAN OF CARE
Problem: Adult Inpatient Plan of Care  Goal: Plan of Care Review  Outcome: Ongoing (interventions implemented as appropriate)  POC reviewed with pt and family at 1400.   Pt verbalized understanding.   Questions and concerns addressed.   See flowsheets for full assessment and VS info.     LR @ 100 ml/hr.

## 2019-07-07 NOTE — PROGRESS NOTES
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  Patient's symptoms started on Tuesday 7/2 when she was undergoing a CT abdomen for evaluation of indigestion. She started to feel a pounding and congestion of head and ears. She did not seek medical attention till today 7/4 when she went to urgent care. Urgent care doc did not feel comfortable treating it as migraine and sent patient to ER; where CT head detected SAH. She is transferred here to [facility].  Here a CTA detected [place] aneurysm and patient is being sent for angio and further neurosurg intervention,.    Hospitla course  7/4: CTH with SAH, CTA with ACom A aneurysm. Patient going to IR angio and neurosurg intervention  7/5: IR angio and coil of ACOM aneurysm  7/7: s/p coil. Daily TCDs. Vasospasm on TCD-increased fluids; follow TCDs. Resumed home inhaler, started protonix for GERD and remelteon QHS for sleep, simethicone prn for gas/indigestion. Switched NS to LR fluids      Subjective:     Interval History: NAEON  Vasospasm on TCDs  Current Neurological Medications:     Current Facility-Administered Medications   Medication Dose Route Frequency Provider Last Rate Last Dose    acetaminophen tablet 650 mg  650 mg Oral Q6H PRN Pedro Estevez PA-C   650 mg at 07/07/19 1021    dextrose 10% (D10W) Bolus  12.5 g Intravenous PRN Heriberto Blanchard MD        dextrose 10% (D10W) Bolus  25 g Intravenous PRN Heriberto Blanchard MD        enoxaparin injection 40 mg  40 mg Subcutaneous Q24H  Doris Misouth, NP   40 mg at 07/06/19 1703    glucagon (human recombinant) injection 1 mg  1 mg Intramuscular PRN Doris Miinea, NP        glucose chewable tablet 16 g  16 g Oral PRN Doris Miinea, NP        glucose chewable tablet 24 g  24 g Oral PRN Doris Miinea, NP        insulin aspart U-100 pen 1-10 Units  1-10 Units Subcutaneous QID (AC + HS) PRN Doris Miinea, NP        lactated ringers infusion   Intravenous Continuous Pedro Estevez PA-C 75 mL/hr at 07/07/19 1107      montelukast tablet 10 mg  10 mg Oral Daily Heriberto Blanchard MD   10 mg at 07/07/19 1000    niMODipine capsule 60 mg  60 mg Oral Q4H Eugene Moreno MD   60 mg at 07/07/19 1022    ondansetron injection 4 mg  4 mg Intravenous Q6H PRN Pedro Estevez PA-C   4 mg at 07/05/19 0742    pantoprazole EC tablet 40 mg  40 mg Oral BID Pedro Estevez PA-C   40 mg at 07/07/19 1021    ramelteon tablet 8 mg  8 mg Oral Nightly PRN Heriberto Blanchard MD        simethicone chewable tablet 80 mg  1 tablet Oral TID PRN Pedro Estevez PA-C        sodium chloride 0.9% flush 10 mL  10 mL Intravenous PRN Nathanael Lopez MD         Facility-Administered Medications Ordered in Other Encounters   Medication Dose Route Frequency Provider Last Rate Last Dose    0.9%  NaCl infusion   Intravenous Continuous Raymond Britton MD        sodium chloride 0.9% flush 10 mL  10 mL Intravenous PRN Raymond Britton MD           Review of Systems   Constitutional: Negative.    HENT: Negative.    Eyes: Negative.    Respiratory: Negative.    Cardiovascular: Negative.    Gastrointestinal: Negative.    Endocrine: Negative.    Genitourinary: Negative.    Musculoskeletal: Negative.    Skin: Negative.    Allergic/Immunologic: Negative.    Neurological: Positive for headaches.   Hematological: Negative.    Psychiatric/Behavioral: Negative.      Objective:     Vital Signs (Most Recent):  Temp: 98.3 °F (36.8 °C) (07/07/19 0701)  Pulse: 65  (07/07/19 1000)  Resp: 17 (07/07/19 1000)  BP: 137/65 (07/07/19 1000)  SpO2: 96 % (07/07/19 1000) Vital Signs (24h Range):  Temp:  [98.3 °F (36.8 °C)-99 °F (37.2 °C)] 98.3 °F (36.8 °C)  Pulse:  [65-85] 65  Resp:  [12-25] 17  SpO2:  [94 %-98 %] 96 %  BP: (110-137)/(55-66) 137/65  Arterial Line BP: ()/(52-74) 102/68     Weight: 69.9 kg (154 lb)  Body mass index is 25.63 kg/m².    Physical Exam   Constitutional: She is oriented to person, place, and time. She appears well-developed and well-nourished.   HENT:   Head: Normocephalic.   Eyes: Pupils are equal, round, and reactive to light. Conjunctivae and EOM are normal.   Neck: Normal range of motion.   Cardiovascular: Normal rate and regular rhythm.   Pulmonary/Chest: Breath sounds normal. No respiratory distress.   Abdominal: Soft. Bowel sounds are normal.   Neurological: She is alert and oriented to person, place, and time. She displays normal reflexes. No cranial nerve deficit or sensory deficit. She exhibits normal muscle tone. She has a normal Finger-Nose-Finger Test. Coordination normal.   Reflex Scores:       Tricep reflexes are 2+ on the right side and 2+ on the left side.       Bicep reflexes are 2+ on the right side and 2+ on the left side.       Brachioradialis reflexes are 2+ on the right side and 2+ on the left side.       Patellar reflexes are 2+ on the right side and 2+ on the left side.       Achilles reflexes are 2+ on the right side and 2+ on the left side.  Skin: Skin is warm. Capillary refill takes less than 2 seconds.   Psychiatric: She has a normal mood and affect. Her behavior is normal. Judgment and thought content normal.       NEUROLOGICAL EXAMINATION:     MENTAL STATUS   Oriented to person, place, and time.   Attention: normal.   Level of consciousness: alert    CRANIAL NERVES   Cranial nerves II through XII intact.     CN III, IV, VI   Pupils are equal, round, and reactive to light.  Extraocular motions are normal.     MOTOR EXAM    Muscle bulk: normal  Overall muscle tone: normal  Right arm tone: normal  Left arm tone: normal  Right leg tone: normal  Left leg tone: normal    Strength   Right neck flexion: 5/5  Left neck flexion: 5/5  Right neck extension: 5/5  Left neck extension: 5/5  Right deltoid: 5/5  Left deltoid: 5/5  Right biceps: 5/5  Left biceps: 5/5  Right triceps: 5/5  Left triceps: 5/5  Right wrist flexion: 5/5  Left wrist flexion: 5/5  Right wrist extension: 5/5  Left wrist extension: 5/5  Right interossei: 5/5  Left interossei: 5/5  Right abdominals: 5/5  Left abdominals: 5/5  Right iliopsoas: 5  Left iliopsoas: /5  Right quadriceps: 5  Left quadriceps: 5  Right hamstrin/5  Left hamstrin/5  Right glutei: /  Left glutei:   Right anterior tibial:   Left anterior tibial:   Right posterior tibial: 5  Left posterior tibial: 5  Right peroneal: 55  Left peroneal: 5/5  Right gastroc: 5/  Left gastroc: 5/5    REFLEXES     Reflexes   Right brachioradialis: 2+  Left brachioradialis: 2+  Right biceps: 2+  Left biceps: 2+  Right triceps: 2+  Left triceps: 2+  Right patellar: 2+  Left patellar: 2+  Right achilles: 2+  Left achilles: 2+  Right plantar: normal  Left plantar: normal    SENSORY EXAM   Light touch normal.     GAIT AND COORDINATION      Coordination   Finger to nose coordination: normal    Tremor   Resting tremor: absent  Intention tremor: absent  Action tremor: absent      Significant Labs: All pertinent lab results from the past 24 hours have been reviewed.    Significant Imaging: I have reviewed all pertinent imaging results/findings within the past 24 hours.  I have reviewed and interpreted all pertinent imaging results/findings within the past 24 hours.    Assessment and Plan:     * Nontraumatic subarachnoid hemorrhage from anterior communicating artery  CTH with SAH  CTA with AComA aneurysm,   IR angio and neurosurg intervention, s/p coil  Keppra 500 BID x 7 days  TCDs daily x 14 days.   TCDs showed vasospasm. Gave fluids, monitor  SBP<160  Na>135      Seizure prophylaxis  keppra 500 mg po BID     Aneurysm of anterior communicating artery  See SAH    Mild intermittent asthma with status asthmaticus  Resumed home inhaler    Insomnia  -remelteon QHS    GERD  -protonix BID    VTE Risk Mitigation (From admission, onward)        Ordered     enoxaparin injection 40 mg  Every 24 hours (non-standard times)      07/06/19 0930          Yamilka Melo MD  Neurology  [facility]

## 2019-07-07 NOTE — SUBJECTIVE & OBJECTIVE
Subjective:     Interval History: NAEON  Vasospasm on TCDs  Current Neurological Medications:     Current Facility-Administered Medications   Medication Dose Route Frequency Provider Last Rate Last Dose    acetaminophen tablet 650 mg  650 mg Oral Q6H PRN Pedro Estevez PA-C   650 mg at 07/07/19 1021    dextrose 10% (D10W) Bolus  12.5 g Intravenous PRN Heriberto Blanchard MD        dextrose 10% (D10W) Bolus  25 g Intravenous PRN Heriberto Blanchard MD        enoxaparin injection 40 mg  40 mg Subcutaneous Q24H Doris Miinea, NP   40 mg at 07/06/19 1703    glucagon (human recombinant) injection 1 mg  1 mg Intramuscular PRN Doris Miinea, NP        glucose chewable tablet 16 g  16 g Oral PRN Doris Miinea, NP        glucose chewable tablet 24 g  24 g Oral PRN Doris Miinea, NP        insulin aspart U-100 pen 1-10 Units  1-10 Units Subcutaneous QID (AC + HS) PRN Doris Miinea, NP        lactated ringers infusion   Intravenous Continuous Pedro Estevez PA-C 75 mL/hr at 07/07/19 1107      montelukast tablet 10 mg  10 mg Oral Daily Heriberto Blanchard MD   10 mg at 07/07/19 1000    niMODipine capsule 60 mg  60 mg Oral Q4H Eugene Moreno MD   60 mg at 07/07/19 1022    ondansetron injection 4 mg  4 mg Intravenous Q6H PRN Pedro Estevez PA-C   4 mg at 07/05/19 0742    pantoprazole EC tablet 40 mg  40 mg Oral BID Pedro Estevez PA-C   40 mg at 07/07/19 1021    ramelteon tablet 8 mg  8 mg Oral Nightly PRN Heriberto Blanchard MD        simethicone chewable tablet 80 mg  1 tablet Oral TID PRN Pedro Estevez PA-C        sodium chloride 0.9% flush 10 mL  10 mL Intravenous PRN Nathanael Lopez MD         Facility-Administered Medications Ordered in Other Encounters   Medication Dose Route Frequency Provider Last Rate Last Dose    0.9%  NaCl infusion   Intravenous Continuous Raymond Britton MD        sodium chloride 0.9% flush 10 mL  10 mL Intravenous PRN Raymond Britton,  MD           Review of Systems   Constitutional: Negative.    HENT: Negative.    Eyes: Negative.    Respiratory: Negative.    Cardiovascular: Negative.    Gastrointestinal: Negative.    Endocrine: Negative.    Genitourinary: Negative.    Musculoskeletal: Negative.    Skin: Negative.    Allergic/Immunologic: Negative.    Neurological: Positive for headaches.   Hematological: Negative.    Psychiatric/Behavioral: Negative.      Objective:     Vital Signs (Most Recent):  Temp: 98.3 °F (36.8 °C) (07/07/19 0701)  Pulse: 65 (07/07/19 1000)  Resp: 17 (07/07/19 1000)  BP: 137/65 (07/07/19 1000)  SpO2: 96 % (07/07/19 1000) Vital Signs (24h Range):  Temp:  [98.3 °F (36.8 °C)-99 °F (37.2 °C)] 98.3 °F (36.8 °C)  Pulse:  [65-85] 65  Resp:  [12-25] 17  SpO2:  [94 %-98 %] 96 %  BP: (110-137)/(55-66) 137/65  Arterial Line BP: ()/(52-74) 102/68     Weight: 69.9 kg (154 lb)  Body mass index is 25.63 kg/m².    Physical Exam   Constitutional: She is oriented to person, place, and time. She appears well-developed and well-nourished.   HENT:   Head: Normocephalic.   Eyes: Pupils are equal, round, and reactive to light. Conjunctivae and EOM are normal.   Neck: Normal range of motion.   Cardiovascular: Normal rate and regular rhythm.   Pulmonary/Chest: Breath sounds normal. No respiratory distress.   Abdominal: Soft. Bowel sounds are normal.   Neurological: She is alert and oriented to person, place, and time. She displays normal reflexes. No cranial nerve deficit or sensory deficit. She exhibits normal muscle tone. She has a normal Finger-Nose-Finger Test. Coordination normal.   Reflex Scores:       Tricep reflexes are 2+ on the right side and 2+ on the left side.       Bicep reflexes are 2+ on the right side and 2+ on the left side.       Brachioradialis reflexes are 2+ on the right side and 2+ on the left side.       Patellar reflexes are 2+ on the right side and 2+ on the left side.       Achilles reflexes are 2+ on the right side  and 2+ on the left side.  Skin: Skin is warm. Capillary refill takes less than 2 seconds.   Psychiatric: She has a normal mood and affect. Her behavior is normal. Judgment and thought content normal.       NEUROLOGICAL EXAMINATION:     MENTAL STATUS   Oriented to person, place, and time.   Attention: normal.   Level of consciousness: alert    CRANIAL NERVES   Cranial nerves II through XII intact.     CN III, IV, VI   Pupils are equal, round, and reactive to light.  Extraocular motions are normal.     MOTOR EXAM   Muscle bulk: normal  Overall muscle tone: normal  Right arm tone: normal  Left arm tone: normal  Right leg tone: normal  Left leg tone: normal    Strength   Right neck flexion: 5/5  Left neck flexion: 5/5  Right neck extension: 5/5  Left neck extension: 5/5  Right deltoid: 5/5  Left deltoid: 5/5  Right biceps: 5/5  Left biceps: 5/5  Right triceps: 5/5  Left triceps: 5/5  Right wrist flexion: 5/5  Left wrist flexion: 5/5  Right wrist extension: 5/5  Left wrist extension: 5/5  Right interossei: 5/5  Left interossei: 5/5  Right abdominals: 5/5  Left abdominals: 5/5  Right iliopsoas: 5/5  Left iliopsoas: 5/5  Right quadriceps: 5/5  Left quadriceps: 5/5  Right hamstrin/5  Left hamstrin/5  Right glutei: 5/5  Left glutei: 5/5  Right anterior tibial: 5/5  Left anterior tibial: 5/5  Right posterior tibial: 5/5  Left posterior tibial: 5/5  Right peroneal: 5/5  Left peroneal: 5/5  Right gastroc: 5/5  Left gastroc: 5/5    REFLEXES     Reflexes   Right brachioradialis: 2+  Left brachioradialis: 2+  Right biceps: 2+  Left biceps: 2+  Right triceps: 2+  Left triceps: 2+  Right patellar: 2+  Left patellar: 2+  Right achilles: 2+  Left achilles: 2+  Right plantar: normal  Left plantar: normal    SENSORY EXAM   Light touch normal.     GAIT AND COORDINATION      Coordination   Finger to nose coordination: normal    Tremor   Resting tremor: absent  Intention tremor: absent  Action tremor: absent      Significant Labs:  All pertinent lab results from the past 24 hours have been reviewed.    Significant Imaging: I have reviewed all pertinent imaging results/findings within the past 24 hours.  I have reviewed and interpreted all pertinent imaging results/findings within the past 24 hours.

## 2019-07-07 NOTE — NURSING
Notified NCC team of TCD results with increased velocities in [place], no new orders at this time.

## 2019-07-07 NOTE — PROGRESS NOTES
[facility]  Neurosurgery  Progress Note    Subjective:     History of Present Illness: 65 yo with hx of breast cancer presents to the ED with complaints of headache after a barium CT abdomen on Tuesday. Reports intractable headache which began 2 days ago. Associated n/v, neck pain. Denies visual changes, weakness, paresthesias, b/b dysfunction. No trauma, falls, or LOC. She does not take antiplatelet therapy or anticoagulants. Currently on a cardene drip started at the outside hospital. She states she is tired from the morphine given prior to transport. Family at bedside.       Post-Op Info:  Procedure(s) (LRB):  ANGIOGRAM-CEREBRAL, acomm aneurysm (N/A)   2 Days Post-Op     Interval History: 7/7: PBD 4, NAEON, AFVSS, Exam stable, TCDs w/o evidence of vasospasm    Medications:  Continuous Infusions:   lactated ringers 75 mL/hr at 07/07/19 1500     Scheduled Meds:   enoxparin  40 mg Subcutaneous Q24H    montelukast  10 mg Oral Daily    niMODipine  60 mg Oral Q4H    pantoprazole  40 mg Oral BID     PRN Meds:acetaminophen, Dextrose 10% Bolus, Dextrose 10% Bolus, glucagon (human recombinant), glucose, glucose, insulin aspart U-100, ondansetron, ramelteon, simethicone, sodium chloride 0.9%     Review of Systems    Objective:     Weight: 69.9 kg (154 lb)  Body mass index is 25.63 kg/m².  Vital Signs (Most Recent):  Temp: 98.5 °F (36.9 °C) (07/07/19 1500)  Pulse: 71 (07/07/19 1500)  Resp: 18 (07/07/19 1500)  BP: (!) 153/70 (07/07/19 1500)  SpO2: 97 % (07/07/19 1500) Vital Signs (24h Range):  Temp:  [98.1 °F (36.7 °C)-98.6 °F (37 °C)] 98.5 °F (36.9 °C)  Pulse:  [65-79] 71  Resp:  [12-25] 18  SpO2:  [94 %-98 %] 97 %  BP: (110-175)/(56-80) 153/70  Arterial Line BP: ()/(56-82) 152/77     Date 07/07/19 0700 - 07/08/19 0659   Shift 6891-0892 1354-1843 7031-4736 24 Hour Total   INTAKE   I.V.(mL/kg) 516.3(7.4) 75(1.1)  591.3(8.5)   Shift Total(mL/kg) 516.3(7.4) 75(1.1)  591.3(8.5)   OUTPUT    Urine(mL/kg/hr) 650(1.2)   650   Shift Total(mL/kg) 650(9.3)   650(9.3)   Weight (kg) 69.9 69.9 69.9 69.9                   Female External Urinary Catheter 07/04/19 1806 (Active)   Skin no redness;no breakdown 7/5/2019  3:05 AM   Tolerance no signs/symptoms of discomfort 7/5/2019  3:05 AM   Suction Continuous suction at 60 mmHg 7/4/2019  7:05 PM   Date of last wick change 07/04/19 7/4/2019  5:40 PM   Time of last wick change 1806 7/4/2019  5:40 PM   Output (mL) 300 mL 7/5/2019  5:05 AM       Neurosurgery Physical Exam  General: AOx3, GCS [id number]  CNII-XII: Intact on fine exam, PERRL, EOMi, facial sensation preserved, no facial assymetry, tongue/uvula/palate midline, shoulder shrug equal, no pronator drift  Extremities: 5/5 motor throughout, sensorium intact throughout, coordination intact throughout, DTRs 2+, no pathological reflexes    Significant Labs:  Recent Labs   Lab 07/06/19  0115 07/07/19  0201   * 111*    144   K 3.7 3.7    111*   CO2 23 23   BUN 14 23   CREATININE 0.8 0.8   CALCIUM 9.5 8.7   MG 2.4 2.2     Recent Labs   Lab 07/06/19  0115 07/07/19  0201   WBC 9.42 12.97*   HGB 12.1 11.0*   HCT 39.2 35.3*    264     No results for input(s): LABPT, INR, APTT in the last 48 hours.  Microbiology Results (last 7 days)     ** No results found for the last 168 hours. **        Significant Diagnostics:  All significant diagnostics reviewed    Assessment/Plan:     Aneurysm of anterior communicating artery  64 F who presented with severe HA, neck pain, vomiting and found to have HH2 F2 SAH secondary to ruptured ACOM aneurysm now s/p coiling 7/5:    Neuro:  --Patient admitted to Neuro-ICU; preponderance of medical care per NCC      -Q1h neurochecks while in ICU  --HOB@30  --Keppra 500 BID x7d  --TCDs daily x14d  --Nimotop 60q4 x 21d  --Continue to monitor   --Continue to follow clinically and notify NSGY immediately if any neurostatus change    CVS:  ---220 mmHg (cardene ggt;  hydralazine & labetalol PRN; transition to home meds when appropriate per NCC)  --Echo normal    Pulm:  --Recc CXR for baseline resp status  --Aggressive pulm treatment per NCC    GIT/Electrolytes:  --CMP daily      -Replete electrolytes PRN per NCC  --Na goal >135  --Recc Utox/UA    Renal:  --Strict I/Os  --Goal of euvolemia or net +1L    Heme/ID:  --Daily CBC      -Transfuse PRN  --Trend WBC and T    PPx:  --TEDs/SCDs  --SQH appropriate once aneurysm secured  --PPI    Dispo: Continued ICU care at this time, PT/OT when appropriate per NCC, CM/SW consult for dispo planning        Eugene Moreno MD  Neurosurgery  [facility]

## 2019-07-07 NOTE — PLAN OF CARE
Problem: Occupational Therapy Goal  Goal: Occupational Therapy Goal  OT evaluation completed.  LARA Batres  7/7/2019

## 2019-07-07 NOTE — PT/OT/SLP EVAL
"Occupational Therapy   Evaluation    Name: Gissel Jamison  MRN: [id number]  Admitting Diagnosis:  Nontraumatic subarachnoid hemorrhage from anterior communicating artery 2 Days Post-Op    Recommendations:     Discharge Recommendations: home  Discharge Equipment Recommendations:  none  Barriers to discharge:  None    Assessment:     Gissel Jamison is a 64 y.o. female with a medical diagnosis of Nontraumatic subarachnoid hemorrhage from anterior communicating artery.  She presents with performance deficits affecting function: impaired endurance, impaired self care skills, impaired functional mobilty.      Rehab Prognosis: Good; patient would benefit from acute skilled OT services to address these deficits and reach maximum level of function.       Plan:     Patient to be seen 2 x/week to address the above listed problems via self-care/home management, therapeutic exercises, therapeutic activities, neuromuscular re-education  · Plan of Care Expires: 08/04/19  · Plan of Care Reviewed with: patient    Subjective     Patient:  "I had a CT of my abdomen because I was having stomach issues and then I got a really bad headache that lasted 3 days.  I went to urgent care and that's when I found out about the aneurysm."    Occupational Profile:  Patient resides in [place] with  in one story home with no steps to enter.  PTA patient independent with ADLs including driving.  Patient is left handed.  PTA patient independent with ADLs including driving.  Works:  .  Hobbies:  Bowling.  Roles/Responsibilities:  Mother, wife, grandmother.    Pain/Comfort:  · Pain Rating 1: 0/10  · Pain Rating Post-Intervention 1: 0/10    Patients cultural, spiritual, [employer] conflicts given the current situation: no    Objective:     Communicated with: Nurse prior to session.  Patient found supine with telemetry, SCD, pulse ox (continuous), peripheral IV, blood pressure cuff, arterial line, bed alarm, PureWick upon OT entry to " room.    General Precautions: Standard, aspiration, fall   Orthopedic Precautions:N/A   Braces: N/A     Occupational Performance:    Bed Mobility:    · Patient completed Rolling/Turning to Left with  modified independence  · Patient completed Rolling/Turning to Right with modified independence  · Patient completed Scooting/Bridging with modified independence  · Patient completed Supine to Sit with modified independence  · Patient completed Sit to Supine with modified independence    Functional Mobility/Transfers:  · Patient completed Sit <> Stand Transfer with stand by assistance  with  no assistive device   · Patient completed Bed <> Chair Transfer using Stand Pivot technique with contact guard assistance with no assistive device    Activities of Daily Living:  · Grooming: stand by assistance while standing  · Upper Body Dressing: contact guard assistance    · Lower Body Dressing: contact guard assistance      Cognitive/Visual Perceptual:  Cognitive/Psychosocial Skills:     -       Oriented to: Person, Place, Time and Situation   -       Follows Commands/attention:Follows one-step commands  -       Communication: clear/fluent  -       Safety awareness/insight to disability: intact   -       Mood/Affect/Coping skills/emotional control: Appropriate to situation and Cooperative  Visual/Perceptual:      -Intact      Physical Exam:  Postural examination/scapula alignment:    -       Rounded shoulders  Skin integrity: Visible skin intact  Edema:  None noted  Sensation:    -       Intact  Upper Extremity Range of Motion:     -       Right Upper Extremity: WNL  -       Left Upper Extremity: WNL  Upper Extremity Strength:    -       Right Upper Extremity: WNL  -       Left Upper Extremity: WNL    AMPAC 6 Click ADL:  AMPAC Total Score: 19    Treatment & Education:  Patient education provided on role of OT.   Continued education, patient/ family training recommended.  Patient alert and oriented x 3; able to follow 4/4 one  step commands.  Patient attentive and interactive throughout the session.  Patient's functional status and disposition recommendation discussed with stroke team in daily rounds.  White board updated in patient's room.  OT asked if there were any other questions; patient/ family had no further questions.   Education:    Patient left supine with all lines intact, call button in reach and bed alarm on    GOALS:   Multidisciplinary Problems     Occupational Therapy Goals        Problem: Occupational Therapy Goal    Goal Priority Disciplines Outcome Interventions   Occupational Therapy Goal     OT, PT/OT     Description:  Goals set 7/7 to be addressed for 14 days with expiration date, 7/21:  Patient will increase functional independence with ADLs by performing:  Patient will demonstrate stand pivot transfers with modified independence.   Not met  Patient will demonstrate grooming while standing with modified independence.   Not met  Patient will demonstrate upper body dressing with modified independence.   Not met  Patient will demonstrate lower body dressing with modified independence.   Not met  Patient will demonstrate toileting with modified independence.   Not met  Patient will demonstrate bathing while seated EOB with modified independence.   Not met                        History:     Past Medical History:   Diagnosis Date    Asthma     Breast cancer     Fibromyalgia     Nontraumatic subarachnoid hemorrhage from anterior communicating artery 7/4/2019    65 yo with hx of breast cancer presents to the ED with complaints of intractable headache after a barium CT abdomen on 7/2. NSGY consulted for SAH found on CT head.  - Neurologically stable on initial assessment. HH2F2  - Stat CTA head ordered - CT head @11am reviewed. Diffuse SAH within basal cisterns and midline falx. No evidence for hydrocephalus on this study. No mass effect or midline shift.     PONV (postoperative nausea and vomiting)     for general  anesthesia       Past Surgical History:   Procedure Laterality Date    Arm Surgery      torn tendon    BACK SURGERY      x2    HYSTERECTOMY      RENETTA    MASTECTOMY      with reconstruction left breast    OOPHORECTOMY      RADIOFREQUENCY THERMAL COAGULATION, NERVE, SPINAL, CERVICAL, POSTERIOR RAMUS, MEDIAL BRANCH Left 2/8/2019    Performed by Raymond Britton MD at [facility] OR    Tonsillectomy      TONSILLECTOMY         Time Tracking:     OT Date of Treatment: 07/07/19  OT Start Time: 0537  OT Stop Time: 0551  OT Total Time (min): 14 min    Billable Minutes:Evaluation 14    LARA Batres  7/7/2019

## 2019-07-07 NOTE — PROGRESS NOTES
[facility]  Vascular Neurology  Comprehensive Stroke Center  Progress Note    Assessment/Plan:     * Nontraumatic subarachnoid hemorrhage from anterior communicating artery    63 yo with hx of breast cancer presents to the ED with complaints of intractable headache after a barium CT abdomen on 7/2. CT head on 7/4 with diffuse SAH within basal cisterns and midline- NSY was consulted. CTA on 7/4 revealed small anterior communicating artery aneurysm-. She was taken to IR on 7/5 and is now s/p coiling. Patient is admitted to NCC for close monitoring.     Antithrombotics: none SAH  Statins: none SAH  Aggressive risk factor modification:   Rehab efforts: PT/OT/SLP to eval and treat.   Diagnostics ordered/pending: daily TCDs  VTE prophylaxis: lovenox  BP parameters: secured aneurysm- keep SBP <200    Aneurysm of anterior communicating artery  S/p coiling- done on 7/4 7/6/19: NAEON. Patient now s/p coiling. Patient reporting improvement in headaches.   7/719: Neuro exam stable. Patient c/o difficulty sleeping and headache overnight. TCDs show increased velocities. Patient to continue PRN tylenol for HA and start ramelteon for sleep tonight.       STROKE DOCUMENTATION   Acute Stroke Times   Last Known Normal Date: 07/03/19  Last Known Normal Time: (unable to determine)  Symptom Onset Date: 07/03/19  Symptom Onset Time: (unclear)  Stroke Team Called Date: 07/05/19  Stroke Team Called Time: 0820  Stroke Team Arrival Date: 07/05/19  Stroke Team Arrival Time: 0825  CT Interpretation Time: 0825  Decision to Treat Time for Alteplase: (No iv alteplase candidate)  Decision to Treat Time for IR: 0825    NIH Scale:  1a. Level of Consciousness: 0-->Alert, keenly responsive  1b. LOC Questions: 0-->Answers both questions correctly  1c. LOC Commands: 0-->Performs both tasks correctly  2. Best Gaze: 0-->Normal  3. Visual: 0-->No visual loss  4. Facial Palsy: 0-->Normal symmetrical movements  5a. Motor Arm, Left:  0-->No drift, limb holds 90 (or 45) degrees for full 10 secs  5b. Motor Arm, Right: 0-->No drift, limb holds 90 (or 45) degrees for full 10 secs  6a. Motor Leg, Left: 0-->No drift, leg holds 30 degree position for full 5 secs  6b. Motor Leg, Right: 0-->No drift, leg holds 30 degree position for full 5 secs  7. Limb Ataxia: 1-->Present in one limb  8. Sensory: 0-->Normal, no sensory loss  9. Best Language: 0-->No aphasia, normal  10. Dysarthria: 0-->Normal  11. Extinction and Inattention (formerly Neglect): 0-->No abnormality  Total (NIH Stroke Scale): 1       Modified Mohsen Score: 0  Dino Coma Scale:    ABCD2 Score:    [id number] Score:   HAS -BLED Score:   ICH Score:0  Hunt & Rucker Classification:Grade II     Hemorrhagic change of an Ischemic Stroke: Does this patient have an ischemic stroke with hemorrhagic changes? No     Neurologic Chief Complaint: Headache- aneurysmal SAH s/p coiling    Subjective:     Interval History: Patient is seen for follow-up neurological assessment and treatment recommendations: Neuro exam stable. Patient c/o difficulty sleeping and headache overnight. TCDs show increased velocities. Patient to continue PRN tylenol for HA and start ramelteon for sleep tonight.     HPI, Past Medical, Family, and Social History remains the same as documented in the initial encounter.     Review of Systems   Constitutional: Negative for fever.   HENT: Negative for trouble swallowing.    Eyes: Negative for visual disturbance.   Respiratory: Negative for shortness of breath and wheezing.    Cardiovascular: Negative for chest pain and palpitations.   Gastrointestinal: Positive for constipation. Negative for diarrhea, nausea and vomiting.   Musculoskeletal: Positive for gait problem.   Neurological: Positive for dizziness and headaches. Negative for speech difficulty, weakness and numbness.   Psychiatric/Behavioral: Positive for sleep disturbance. Negative for confusion and decreased concentration.      Scheduled Meds:   enoxparin  40 mg Subcutaneous Q24H    montelukast  10 mg Oral Daily    niMODipine  60 mg Oral Q4H    pantoprazole  40 mg Oral BID     Continuous Infusions:   lactated ringers 75 mL/hr at 07/07/19 1500     PRN Meds:acetaminophen, Dextrose 10% Bolus, Dextrose 10% Bolus, glucagon (human recombinant), glucose, glucose, insulin aspart U-100, ondansetron, ramelteon, simethicone, sodium chloride 0.9%    Objective:     Vital Signs (Most Recent):  Temp: 98.5 °F (36.9 °C) (07/07/19 1500)  Pulse: 71 (07/07/19 1500)  Resp: 18 (07/07/19 1500)  BP: (!) 153/70 (07/07/19 1500)  SpO2: 97 % (07/07/19 1500)  BP Location: Right arm    Vital Signs Range (Last 24H):  Temp:  [98.1 °F (36.7 °C)-98.6 °F (37 °C)]   Pulse:  [65-79]   Resp:  [12-25]   BP: (110-175)/(56-80)   SpO2:  [94 %-98 %]   Arterial Line BP: ()/(56-82)   BP Location: Right arm    Physical Exam   Constitutional: Vital signs are normal. She appears well-developed and well-nourished.   HENT:   Head: Normocephalic and atraumatic.   Right Ear: External ear normal.   Left Ear: External ear normal.   Nose: Nose normal.   Mouth/Throat: Uvula is midline, oropharynx is clear and moist and mucous membranes are normal.   Eyes: Pupils are equal, round, and reactive to light. Conjunctivae and EOM are normal.   Cardiovascular: Normal rate.   Pulmonary/Chest: Effort normal.   Musculoskeletal: Normal range of motion.   Neurological: She has normal strength. No cranial nerve deficit or sensory deficit. GCS eye subscore is 4. GCS verbal subscore is 5. GCS motor subscore is 6.   Skin: Skin is warm and dry.   Psychiatric: She has a normal mood and affect. Her speech is normal and behavior is normal. Thought content normal.       Neurological Exam:   LOC: alert  Attention Span: Good   Language: No aphasia  Articulation: No dysarthria  Orientation: Person, Place, Time   Visual Fields: Full  EOM (CN III, IV, VI): Full/intact  Pupils (CN II, III): PERRL  Facial  Sensation (CN V): Normal  Facial Movement (CN VII): Symmetric facial expression    Motor: Arm left  Normal 5/5  Leg left  Normal 5/5  Arm right  Normal 5/5  Leg right Normal 5/5  Cebellar: Upper Extremity Appendicular Ataxia (Finger Nose Finger)  Left  Sensation: Intact to light touch, temperature and vibration  Tone: Normal tone throughout    Laboratory:  CMP:   Recent Labs   Lab 07/07/19  0201   CALCIUM 8.7   ALBUMIN 3.2*   PROT 6.1      K 3.7   CO2 23   *   BUN 23   CREATININE 0.8   ALKPHOS 82   ALT 11   AST 14   BILITOT 0.3     BMP:   Recent Labs   Lab 07/07/19  0201      K 3.7   *   CO2 23   BUN 23   CREATININE 0.8   CALCIUM 8.7     CBC:   Recent Labs   Lab 07/07/19  0201   WBC 12.97*   RBC 4.03   HGB 11.0*   HCT 35.3*      MCV 88   MCH 27.3   MCHC 31.2*     Lipid Panel:   Recent Labs   Lab 07/05/19  0110   CHOL 256*   LDLCALC 179.2*   HDL 55   TRIG 109     Coagulation:   Recent Labs   Lab 07/04/19  1104   INR 1.1   APTT 33.0*     Platelet Aggregation Study: No results for input(s): PLTAGG, PLTAGINTERP, PLTAGREGLACO, ADPPLTAGGREG in the last 168 hours.  Hgb A1C:   Recent Labs   Lab 07/05/19  0110   HGBA1C 5.9*     TSH: No results for input(s): TSH in the last 168 hours.    Diagnostic Results     Brain Imaging     CT head w/o contrast 07/04/2019  1. Subarachnoid hemorrhage.  In the setting of no trauma suspect aneurysm    Vessel Imaging     IR angio 07/05/2019  1. 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    2.  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    3.  Distal embolism likely from the access catheter into the distal branches of the anterior middle cerebral artery on the left side treated successfully with intra-arterial Integrilin    CTA head 07/04/2019  Small anterior communicating artery aneurysm likely the origin of the recent subarachnoid bleed.    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 The estimated ejection fraction is 63%  · Concentric left ventricular remodeling.  · No wall motion abnormalities.  · Normal LV diastolic function.  · Normal right ventricular systolic function.  · Mild tricuspid regurgitation.  · Normal central venous pressure (3 mm Hg).  · The estimated PA systolic pressure is 22 mm Hg        Lauren Syed NP  [facility]  Department of Vascular Neurology   [facility]

## 2019-07-07 NOTE — SUBJECTIVE & OBJECTIVE
Interval History: 7/7: PBD 4, NAEON, AFVSS, Exam stable, TCDs w/o evidence of vasospasm    Medications:  Continuous Infusions:   lactated ringers 75 mL/hr at 07/07/19 1500     Scheduled Meds:   enoxparin  40 mg Subcutaneous Q24H    montelukast  10 mg Oral Daily    niMODipine  60 mg Oral Q4H    pantoprazole  40 mg Oral BID     PRN Meds:acetaminophen, Dextrose 10% Bolus, Dextrose 10% Bolus, glucagon (human recombinant), glucose, glucose, insulin aspart U-100, ondansetron, ramelteon, simethicone, sodium chloride 0.9%     Review of Systems    Objective:     Weight: 69.9 kg (154 lb)  Body mass index is 25.63 kg/m².  Vital Signs (Most Recent):  Temp: 98.5 °F (36.9 °C) (07/07/19 1500)  Pulse: 71 (07/07/19 1500)  Resp: 18 (07/07/19 1500)  BP: (!) 153/70 (07/07/19 1500)  SpO2: 97 % (07/07/19 1500) Vital Signs (24h Range):  Temp:  [98.1 °F (36.7 °C)-98.6 °F (37 °C)] 98.5 °F (36.9 °C)  Pulse:  [65-79] 71  Resp:  [12-25] 18  SpO2:  [94 %-98 %] 97 %  BP: (110-175)/(56-80) 153/70  Arterial Line BP: ()/(56-82) 152/77     Date 07/07/19 0700 - 07/08/19 0659   Shift 1590-3930 9026-4164 1761-6975 24 Hour Total   INTAKE   I.V.(mL/kg) 516.3(7.4) 75(1.1)  591.3(8.5)   Shift Total(mL/kg) 516.3(7.4) 75(1.1)  591.3(8.5)   OUTPUT   Urine(mL/kg/hr) 650(1.2)   650   Shift Total(mL/kg) 650(9.3)   650(9.3)   Weight (kg) 69.9 69.9 69.9 69.9                   Female External Urinary Catheter 07/04/19 1806 (Active)   Skin no redness;no breakdown 7/5/2019  3:05 AM   Tolerance no signs/symptoms of discomfort 7/5/2019  3:05 AM   Suction Continuous suction at 60 mmHg 7/4/2019  7:05 PM   Date of last wick change 07/04/19 7/4/2019  5:40 PM   Time of last wick change 1806 7/4/2019  5:40 PM   Output (mL) 300 mL 7/5/2019  5:05 AM       Neurosurgery Physical Exam  General: AOx3, GCS E4V5M6  CNII-XII: Intact on fine exam, PERRL, EOMi, facial sensation preserved, no facial assymetry, tongue/uvula/palate midline, shoulder shrug equal, no pronator  drift  Extremities: 5/5 motor throughout, sensorium intact throughout, coordination intact throughout, DTRs 2+, no pathological reflexes    Significant Labs:  Recent Labs   Lab 07/06/19  0115 07/07/19  0201   * 111*    144   K 3.7 3.7    111*   CO2 23 23   BUN 14 23   CREATININE 0.8 0.8   CALCIUM 9.5 8.7   MG 2.4 2.2     Recent Labs   Lab 07/06/19  0115 07/07/19  0201   WBC 9.42 12.97*   HGB 12.1 11.0*   HCT 39.2 35.3*    264     No results for input(s): LABPT, INR, APTT in the last 48 hours.  Microbiology Results (last 7 days)     ** No results found for the last 168 hours. **        Significant Diagnostics:  All significant diagnostics reviewed

## 2019-07-07 NOTE — ASSESSMENT & PLAN NOTE
CTH with SAH  CTA with AComA aneurysm,   IR angio and neurosurg intervention, s/p coil  Keppra 500 BID x 7 days  TCDs daily x 14 days. 7/7 TCDs showed vasospasm. Gave fluids, monitor  SBP<160  Na>135

## 2019-07-07 NOTE — ASSESSMENT & PLAN NOTE
64 F who presented with severe HA, neck pain, vomiting and found to have HH2 F2 SAH secondary to ruptured ACOM aneurysm now s/p coiling 7/5:    Neuro:  --Patient admitted to Neuro-ICU; preponderance of medical care per NCC      -Q1h neurochecks while in ICU  --HOB@30  --Keppra 500 BID x7d  --TCDs daily x14d  --Nimotop 60q4 x 21d  --Continue to monitor   --Continue to follow clinically and notify NSGY immediately if any neurostatus change    CVS:  ---220 mmHg (cardene ggt; hydralazine & labetalol PRN; transition to home meds when appropriate per NCC)  --Echo normal    Pulm:  --Recc CXR for baseline resp status  --Aggressive pulm treatment per NCC    GIT/Electrolytes:  --CMP daily      -Replete electrolytes PRN per NCC  --Na goal >135  --Recc Utox/UA    Renal:  --Strict I/Os  --Goal of euvolemia or net +1L    Heme/ID:  --Daily CBC      -Transfuse PRN  --Trend WBC and T    PPx:  --TEDs/SCDs  --SQH appropriate once aneurysm secured  --PPI    Dispo: Continued ICU care at this time, PT/OT when appropriate per NCC, CM/SW consult for dispo planning

## 2019-07-08 PROBLEM — I67.848 CEREBRAL VASOSPASM: Status: ACTIVE | Noted: 2019-07-08

## 2019-07-08 LAB
ALBUMIN SERPL BCP-MCNC: 3 G/DL (ref 3.5–5.2)
ALP SERPL-CCNC: 92 U/L (ref 55–135)
ALT SERPL W/O P-5'-P-CCNC: 40 U/L (ref 10–44)
ANION GAP SERPL CALC-SCNC: 10 MMOL/L (ref 8–16)
AST SERPL-CCNC: 48 U/L (ref 10–40)
BASOPHILS # BLD AUTO: 0.04 K/UL (ref 0–0.2)
BASOPHILS NFR BLD: 0.4 % (ref 0–1.9)
BILIRUB SERPL-MCNC: 0.3 MG/DL (ref 0.1–1)
BUN SERPL-MCNC: 14 MG/DL (ref 8–23)
CALCIUM SERPL-MCNC: 8.9 MG/DL (ref 8.7–10.5)
CHLORIDE SERPL-SCNC: 107 MMOL/L (ref 95–110)
CO2 SERPL-SCNC: 24 MMOL/L (ref 23–29)
CREAT SERPL-MCNC: 0.6 MG/DL (ref 0.5–1.4)
DIFFERENTIAL METHOD: ABNORMAL
EOSINOPHIL # BLD AUTO: 0.1 K/UL (ref 0–0.5)
EOSINOPHIL NFR BLD: 1.1 % (ref 0–8)
ERYTHROCYTE [DISTWIDTH] IN BLOOD BY AUTOMATED COUNT: 13.7 % (ref 11.5–14.5)
EST. GFR  (AFRICAN AMERICAN): >60 ML/MIN/1.73 M^2
EST. GFR  (NON AFRICAN AMERICAN): >60 ML/MIN/1.73 M^2
GLUCOSE SERPL-MCNC: 100 MG/DL (ref 70–110)
GLUCOSE SERPL-MCNC: 116 MG/DL (ref 70–110)
HCO3 UR-SCNC: 26.1 MMOL/L (ref 24–28)
HCT VFR BLD AUTO: 34.5 % (ref 37–48.5)
HCT VFR BLD CALC: 33 %PCV (ref 36–54)
HGB BLD-MCNC: 10.9 G/DL (ref 12–16)
IMM GRANULOCYTES # BLD AUTO: 0.1 K/UL (ref 0–0.04)
IMM GRANULOCYTES NFR BLD AUTO: 0.9 % (ref 0–0.5)
LYMPHOCYTES # BLD AUTO: 2.6 K/UL (ref 1–4.8)
LYMPHOCYTES NFR BLD: 24.2 % (ref 18–48)
MAGNESIUM SERPL-MCNC: 1.9 MG/DL (ref 1.6–2.6)
MCH RBC QN AUTO: 27.2 PG (ref 27–31)
MCHC RBC AUTO-ENTMCNC: 31.6 G/DL (ref 32–36)
MCV RBC AUTO: 86 FL (ref 82–98)
MONOCYTES # BLD AUTO: 0.9 K/UL (ref 0.3–1)
MONOCYTES NFR BLD: 8.5 % (ref 4–15)
NEUTROPHILS # BLD AUTO: 6.9 K/UL (ref 1.8–7.7)
NEUTROPHILS NFR BLD: 64.9 % (ref 38–73)
NRBC BLD-RTO: 0 /100 WBC
PCO2 BLDA: 37.9 MMHG (ref 35–45)
PH SMN: 7.45 [PH] (ref 7.35–7.45)
PHOSPHATE SERPL-MCNC: 4.6 MG/DL (ref 2.7–4.5)
PLATELET # BLD AUTO: 264 K/UL (ref 150–350)
PMV BLD AUTO: 9.4 FL (ref 9.2–12.9)
PO2 BLDA: 414 MMHG (ref 80–100)
POC ACTIVATED CLOTTING TIME K: 125 SEC (ref 74–137)
POC BE: 2 MMOL/L
POC IONIZED CALCIUM: 1.11 MMOL/L (ref 1.06–1.42)
POC SATURATED O2: 100 % (ref 95–100)
POC TCO2: 27 MMOL/L (ref 23–27)
POCT GLUCOSE: 122 MG/DL (ref 70–110)
POCT GLUCOSE: 127 MG/DL (ref 70–110)
POCT GLUCOSE: 152 MG/DL (ref 70–110)
POCT GLUCOSE: 92 MG/DL (ref 70–110)
POTASSIUM BLD-SCNC: 3.2 MMOL/L (ref 3.5–5.1)
POTASSIUM SERPL-SCNC: 3.9 MMOL/L (ref 3.5–5.1)
PROT SERPL-MCNC: 5.8 G/DL (ref 6–8.4)
RBC # BLD AUTO: 4.01 M/UL (ref 4–5.4)
SAMPLE: ABNORMAL
SAMPLE: NORMAL
SODIUM BLD-SCNC: 142 MMOL/L (ref 136–145)
SODIUM SERPL-SCNC: 141 MMOL/L (ref 136–145)
WBC # BLD AUTO: 10.65 K/UL (ref 3.9–12.7)

## 2019-07-08 PROCEDURE — 63600175 PHARM REV CODE 636 W HCPCS: Performed by: PSYCHIATRY & NEUROLOGY

## 2019-07-08 PROCEDURE — 99233 SBSQ HOSP IP/OBS HIGH 50: CPT | Mod: ,,, | Performed by: PSYCHIATRY & NEUROLOGY

## 2019-07-08 PROCEDURE — 25000003 PHARM REV CODE 250: Performed by: PSYCHIATRY & NEUROLOGY

## 2019-07-08 PROCEDURE — 85025 COMPLETE CBC W/AUTO DIFF WBC: CPT

## 2019-07-08 PROCEDURE — 25000003 PHARM REV CODE 250: Performed by: STUDENT IN AN ORGANIZED HEALTH CARE EDUCATION/TRAINING PROGRAM

## 2019-07-08 PROCEDURE — 94761 N-INVAS EAR/PLS OXIMETRY MLT: CPT

## 2019-07-08 PROCEDURE — 99233 PR SUBSEQUENT HOSPITAL CARE,LEVL III: ICD-10-PCS | Mod: ,,, | Performed by: PSYCHIATRY & NEUROLOGY

## 2019-07-08 PROCEDURE — 83735 ASSAY OF MAGNESIUM: CPT

## 2019-07-08 PROCEDURE — 84100 ASSAY OF PHOSPHORUS: CPT

## 2019-07-08 PROCEDURE — 25000003 PHARM REV CODE 250: Performed by: UROLOGY

## 2019-07-08 PROCEDURE — 80053 COMPREHEN METABOLIC PANEL: CPT

## 2019-07-08 PROCEDURE — 97162 PT EVAL MOD COMPLEX 30 MIN: CPT

## 2019-07-08 PROCEDURE — 63600175 PHARM REV CODE 636 W HCPCS: Performed by: NURSE PRACTITIONER

## 2019-07-08 PROCEDURE — 20000000 HC ICU ROOM

## 2019-07-08 RX ORDER — POTASSIUM CHLORIDE 7.45 MG/ML
40 INJECTION INTRAVENOUS
Status: DISCONTINUED | OUTPATIENT
Start: 2019-07-08 | End: 2019-07-08

## 2019-07-08 RX ORDER — POTASSIUM CHLORIDE 7.45 MG/ML
80 INJECTION INTRAVENOUS
Status: DISCONTINUED | OUTPATIENT
Start: 2019-07-08 | End: 2019-07-08

## 2019-07-08 RX ORDER — LANOLIN ALCOHOL/MO/W.PET/CERES
800 CREAM (GRAM) TOPICAL
Status: DISCONTINUED | OUTPATIENT
Start: 2019-07-08 | End: 2019-07-18 | Stop reason: HOSPADM

## 2019-07-08 RX ORDER — MAGNESIUM SULFATE/D5W 2 G/50 ML
2 INTRAVENOUS SOLUTION, PIGGYBACK (ML) INTRAVENOUS ONCE
Status: DISCONTINUED | OUTPATIENT
Start: 2019-07-08 | End: 2019-07-08

## 2019-07-08 RX ORDER — SODIUM,POTASSIUM PHOSPHATES 280-250MG
2 POWDER IN PACKET (EA) ORAL
Status: DISCONTINUED | OUTPATIENT
Start: 2019-07-08 | End: 2019-07-18 | Stop reason: HOSPADM

## 2019-07-08 RX ORDER — MAGNESIUM SULFATE HEPTAHYDRATE 40 MG/ML
4 INJECTION, SOLUTION INTRAVENOUS
Status: DISCONTINUED | OUTPATIENT
Start: 2019-07-08 | End: 2019-07-08

## 2019-07-08 RX ORDER — POTASSIUM CHLORIDE 20 MEQ/15ML
40 SOLUTION ORAL
Status: DISCONTINUED | OUTPATIENT
Start: 2019-07-08 | End: 2019-07-18 | Stop reason: HOSPADM

## 2019-07-08 RX ORDER — MAGNESIUM SULFATE HEPTAHYDRATE 40 MG/ML
2 INJECTION, SOLUTION INTRAVENOUS ONCE
Status: COMPLETED | OUTPATIENT
Start: 2019-07-08 | End: 2019-07-08

## 2019-07-08 RX ORDER — MAGNESIUM SULFATE HEPTAHYDRATE 40 MG/ML
2 INJECTION, SOLUTION INTRAVENOUS
Status: DISCONTINUED | OUTPATIENT
Start: 2019-07-08 | End: 2019-07-08

## 2019-07-08 RX ORDER — POTASSIUM CHLORIDE 20 MEQ/15ML
60 SOLUTION ORAL
Status: DISCONTINUED | OUTPATIENT
Start: 2019-07-08 | End: 2019-07-18 | Stop reason: HOSPADM

## 2019-07-08 RX ORDER — POTASSIUM CHLORIDE 7.45 MG/ML
60 INJECTION INTRAVENOUS
Status: DISCONTINUED | OUTPATIENT
Start: 2019-07-08 | End: 2019-07-08

## 2019-07-08 RX ORDER — FLUDROCORTISONE ACETATE 0.1 MG/1
100 TABLET ORAL 2 TIMES DAILY
Status: DISCONTINUED | OUTPATIENT
Start: 2019-07-08 | End: 2019-07-10

## 2019-07-08 RX ORDER — GABAPENTIN 100 MG/1
100 CAPSULE ORAL 3 TIMES DAILY
Status: DISCONTINUED | OUTPATIENT
Start: 2019-07-08 | End: 2019-07-18 | Stop reason: HOSPADM

## 2019-07-08 RX ADMIN — NIMODIPINE 60 MG: 30 CAPSULE, LIQUID FILLED ORAL at 09:07

## 2019-07-08 RX ADMIN — GABAPENTIN 100 MG: 100 CAPSULE ORAL at 12:07

## 2019-07-08 RX ADMIN — PANTOPRAZOLE SODIUM 40 MG: 40 TABLET, DELAYED RELEASE ORAL at 08:07

## 2019-07-08 RX ADMIN — PANTOPRAZOLE SODIUM 40 MG: 40 TABLET, DELAYED RELEASE ORAL at 09:07

## 2019-07-08 RX ADMIN — ONDANSETRON 4 MG: 2 INJECTION INTRAMUSCULAR; INTRAVENOUS at 07:07

## 2019-07-08 RX ADMIN — ENOXAPARIN SODIUM 40 MG: 100 INJECTION SUBCUTANEOUS at 04:07

## 2019-07-08 RX ADMIN — NIMODIPINE 60 MG: 30 CAPSULE, LIQUID FILLED ORAL at 05:07

## 2019-07-08 RX ADMIN — NIMODIPINE 60 MG: 30 CAPSULE, LIQUID FILLED ORAL at 02:07

## 2019-07-08 RX ADMIN — FLUDROCORTISONE ACETATE 100 MCG: 0.1 TABLET ORAL at 12:07

## 2019-07-08 RX ADMIN — MAGNESIUM SULFATE IN WATER 2 G: 40 INJECTION, SOLUTION INTRAVENOUS at 12:07

## 2019-07-08 RX ADMIN — GABAPENTIN 100 MG: 100 CAPSULE ORAL at 09:07

## 2019-07-08 RX ADMIN — MONTELUKAST SODIUM 10 MG: 10 TABLET, COATED ORAL at 08:07

## 2019-07-08 RX ADMIN — NIMODIPINE 60 MG: 30 CAPSULE, LIQUID FILLED ORAL at 06:07

## 2019-07-08 RX ADMIN — NIMODIPINE 60 MG: 30 CAPSULE, LIQUID FILLED ORAL at 10:07

## 2019-07-08 RX ADMIN — FLUDROCORTISONE ACETATE 100 MCG: 0.1 TABLET ORAL at 09:07

## 2019-07-08 RX ADMIN — ONDANSETRON 4 MG: 2 INJECTION INTRAMUSCULAR; INTRAVENOUS at 09:07

## 2019-07-08 RX ADMIN — GABAPENTIN 100 MG: 100 CAPSULE ORAL at 04:07

## 2019-07-08 RX ADMIN — ACETAMINOPHEN 650 MG: 325 TABLET ORAL at 11:07

## 2019-07-08 RX ADMIN — SODIUM CHLORIDE, SODIUM LACTATE, POTASSIUM CHLORIDE, AND CALCIUM CHLORIDE 500 ML: .6; .31; .03; .02 INJECTION, SOLUTION INTRAVENOUS at 08:07

## 2019-07-08 RX ADMIN — ACETAMINOPHEN 650 MG: 325 TABLET ORAL at 08:07

## 2019-07-08 NOTE — PROGRESS NOTES
[facility]  Neurocritical Care  Progress Note    Admit Date: 7/4/2019  Service Date: 07/08/2019  Length of Stay: 4    Subjective:     Chief Complaint: Nontraumatic subarachnoid hemorrhage from anterior communicating artery    History of Present Illness: 64 y F with medical h.o breast cancer s/p mastectomy, lumpectomy, who presented to the ER as a transfer from [facility] as SAH detected on CTH.  Patient's symptoms started on Tuesday 7/2 when she was undergoing a CT abdomen for evaluation of indigestion. She started to feel a pounding and congestion of head and ears. She did not seek medical attention till today 7/4 when she went to urgent care. Urgent care doc did not feel comfortable treating it as migraine and sent patient to ER; where CT head detected SAH. She is transferred here to [facility].  Here a CTA detected [place] aneurysm and patient is being sent for angio and further neurosurg intervention,.    Hospital Course: 07/05/2019 NAEO. For conventional angiogram today ACOM aneurysm on CTA  7/7/19: add lovenox for DVT proph., initiate IVF, PT/OT, liberalize BP <200  07/08/2019 headache. Mild to moderate cerebral VSP.       Review of Symptoms: headache, nausea  Constitutional: Denies fevers, weight loss, chills, or weakness.   Eyes: Denies changes in vision.   ENT: Denies dysphagia, nasal discharge, ear pain or discharge.   Cardiovascular: Denies chest pain, palpitations, orthopnea, or claudication.   Respiratory: Denies shortness of breath, cough, hemoptysis, or wheezing.   GI: Denies  hematochezia, melena, abd pain, or changes in appetite.   : Denies dysuria, incontinence, or hematuria.   Musculoskeletal: Denies joint pain or myalgias.   Skin/breast: Denies rashes, lumps, lesions, or discharge.   Neurologic: Denies  dizziness, vertigo, or paresthesias.   Psychiatric: Denies changes in mood or hallucinations.   Endocrine: Denies polyuria, polydipsia, heat/cold intolerance.    Hematologic/Lymph: Denies lymphadenopathy, easy bruising or easy bleeding.   Allergic/Immunologic: Denies rash, rhinitis        Medications:  Continuous Infusions:   lactated ringers 100 mL/hr at 07/08/19 1000     Scheduled Meds:   enoxparin  40 mg Subcutaneous Q24H    fludrocortisone  100 mcg Oral BID    gabapentin  100 mg Oral TID    magnesium sulfate  2 g Intravenous Once    magnesium sulfate  2 g Intravenous Once    montelukast  10 mg Oral Daily    niMODipine  60 mg Oral Q4H    pantoprazole  40 mg Oral BID     PRN Meds:.acetaminophen, calcium gluconate IVPB, calcium gluconate IVPB, calcium gluconate IVPB, Dextrose 10% Bolus, Dextrose 10% Bolus, glucagon (human recombinant), glucose, glucose, insulin aspart U-100, magnesium sulfate IVPB, magnesium sulfate IVPB, ondansetron, potassium chloride in water **AND** potassium chloride in water **AND** potassium chloride in water, ramelteon, simethicone, sodium chloride 0.9%, sodium phosphate IVPB, sodium phosphate IVPB, sodium phosphate IVPB    OBJECTIVE:   Vital Signs (Most Recent):   Temp: 99 °F (37.2 °C) (07/08/19 0700)  Pulse: 70 (07/08/19 1000)  Resp: 19 (07/08/19 1000)  BP: (!) 155/70 (07/08/19 1000)  SpO2: (!) 93 % (07/08/19 1000)    Vital Signs (24h Range):   Temp:  [98.4 °F (36.9 °C)-99 °F (37.2 °C)] 99 °F (37.2 °C)  Pulse:  [61-89] 70  Resp:  [15-46] 19  SpO2:  [93 %-100 %] 93 %  BP: (120-175)/(56-80) 155/70  Arterial Line BP: ()/(40-82) 86/58    ICP/CPP (Last 24h):        I & O (Last 24h):     Intake/Output Summary (Last 24 hours) at 7/8/2019 1133  Last data filed at 7/8/2019 1000  Gross per 24 hour   Intake 2319.58 ml   Output 2600 ml   Net -280.42 ml     Physical Exam: unchanged from previous day  GA: Alert, comfortable, no acute distress.   HEENT: No scleral icterus or JVD. Neck supple  Pulmonary: Air entry equal to auscultation A/P/L. Wheezing no, crackles no  Cardiac: RRR, S1 & S2 w/o rubs/murmurs/gallops.   Abdominal: soft, non-tender,  bowel sounds present x 4. No appreciable hepatosplenomegaly.  Skin: No jaundice, rashes, or visible lesions.  Neuro:  --GCS: 15  --Mental Status:   Awake and alert, aao x3. Fluent, follows commands.   --CN II-XII grossly intact.   --Pupils 3.5 mm, PERRL.   --Corneal reflex not done in this awake patient, gag, cough intact.  --LUE strength: 5/5  --RUE strength: 5/5  --LLE strength: 5/5  --RLE strength: 5/5  MSK:  No edema in UE and LE    Vent Data:        Lines/Drains/Airway:            Arterial Line 07/05/19 1100 Left Radial (Active)   Site Assessment Clean;Dry;Intact;No redness;No swelling 7/8/2019  7:00 AM   Line Status Pulsatile blood flow 7/8/2019  7:00 AM   Art Line Waveform Dampened;Square wave test performed 7/8/2019  7:00 AM   Arterial Line Interventions Zeroed and calibrated;Leveled;Connections checked and tightened;Flushed per protocol 7/8/2019  7:00 AM   Color/Movement/Sensation Capillary refill less than 3 sec 7/8/2019  7:00 AM   Dressing Type Transparent 7/8/2019  7:00 AM   Dressing Status Biopatch in place;Clean;Dry;Intact 7/8/2019  7:00 AM   Dressing Intervention Dressing reinforced 7/8/2019  7:00 AM   Dressing Change Due 07/12/19 7/8/2019  7:00 AM      Female External Urinary Catheter 07/04/19 1806 (Active)   Skin perineum cleansed w/ soap and water 7/8/2019  7:00 AM   Tolerance no signs/symptoms of discomfort 7/8/2019  7:00 AM   Suction Continuous suction at 40 mmHg 7/8/2019  7:00 AM   Date of last wick change 07/06/19 7/8/2019  7:00 AM   Time of last wick change 2100 7/8/2019  7:00 AM   Output (mL) 250 mL 7/8/2019 10:00 AM       Nutrition/Tube Feeds (if NPO state why): npo for angiogram     Labs:  ABG: No results for input(s): PH, PO2, PCO2, HCO3, POCSATURATED, BE in the last 24 hours.  BMP:  Recent Labs   Lab 07/08/19  0205      K 3.9      CO2 24   BUN 14   CREATININE 0.6      MG 1.9   PHOS 4.6*     LFT:   Lab Results   Component Value Date    AST 48 (H) 07/08/2019    ALT 40  07/08/2019    ALKPHOS 92 07/08/2019    BILITOT 0.3 07/08/2019    ALBUMIN 3.0 (L) 07/08/2019    PROT 5.8 (L) 07/08/2019     CBC:   Lab Results   Component Value Date    WBC 10.65 07/08/2019    HGB 10.9 (L) 07/08/2019    HCT 34.5 (L) 07/08/2019    MCV 86 07/08/2019     07/08/2019     Microbiology x 7d:   Microbiology Results (last 7 days)     ** No results found for the last 168 hours. **        Imaging:    I personally reviewed the above image.  Today I independently reviewed pertinent medications, lines/drains/airways, imaging, cardiology results, laboratory results, microbiology results, notably:   ASSESSMENT/PLAN:     Active Hospital Problems    Diagnosis    *Nontraumatic subarachnoid hemorrhage from anterior communicating artery    Cerebral vasospasm    Aneurysm of anterior communicating artery    Mild intermittent asthma with status asthmaticus      Neuro:   SAH day 4  Post coil day 3  Nimodipine/ daily TCD  Maintain euvolemia   q1hr neurochecks.   Trial of  neurontin 100mg q8hrly  PT/OT    Pulmonary:   Saturations > 92% on room air    Cardiac:   Permissive hypertension upto   EF 63% 7/7    Renal:    Monitor urine output and sodium for signs of CSW  Start florinef 0.1mg [id number]    ID:   Afebrile, normal wbc    Hem/Onc:   Stable hh and plt count    Endocrine:    BS stable    Fluids/Electrolytes/Nutrition/GI:   po  Replete lytes as needed  Lines:  Art +  CVC NA  ETT NA  Man NA  NG NA  PEG NA    Proph:  DVT:SCD/lovenox  Constipation:  Last output:bowel regimen as needed  PUP:NA  VAP:NA      Uninterrupted Critical Care/Counseling Time (not including procedures):: III    Don Alvarez MD  Neurocritical care attending    Full Code    Don Alvarez MD  Neurocritical Care  [facility]

## 2019-07-08 NOTE — ASSESSMENT & PLAN NOTE
64 F who presented with severe HA, neck pain, vomiting and found to have HH2 F2 SAH secondary to ruptured ACOM aneurysm now s/p coiling 7/5:    PBD 5 PCD3    Neuro:  --Patient admitted to Neuro-ICU; preponderance of medical care per NCC      -Q1h neurochecks while in ICU  --HOB@30  --Keppra 500 BID x7d  --TCDs daily x14d  --Nimotop 60q4 x 21d  --Continue to monitor   --Continue to follow clinically and notify NSGY immediately if any neurostatus change    CVS:  ---220 mmHg (cardene ggt; hydralazine & labetalol PRN; transition to home meds when appropriate per NCC)  --Echo normal    Pulm:  --Recc CXR for baseline resp status  --Aggressive pulm treatment per NCC    GIT/Electrolytes:  --CMP daily      -Replete electrolytes PRN per NCC  --Na goal >135  --Recc Utox/UA    Renal:  --Strict I/Os  --Goal of euvolemia or net +1L - net negative today, will bolus 500cc and increase MIVF to 100/hr    Heme/ID:  --Daily CBC      -Transfuse PRN  --Trend WBC and T    PPx:  --TEDs/SCDs  --SQH appropriate once aneurysm secured  --PPI    Dispo: Continued ICU care at this time, PT/OT when appropriate per NCC, CM/SW consult for dispo planning

## 2019-07-08 NOTE — SUBJECTIVE & OBJECTIVE
Interval History: PBD 5 NAEON. Neuro exam unchanged.     Medications:  Continuous Infusions:   lactated ringers 75 mL/hr at 07/08/19 0700     Scheduled Meds:   enoxparin  40 mg Subcutaneous Q24H    lactated ringers  500 mL Intravenous Once    montelukast  10 mg Oral Daily    niMODipine  60 mg Oral Q4H    pantoprazole  40 mg Oral BID     PRN Meds:acetaminophen, Dextrose 10% Bolus, Dextrose 10% Bolus, glucagon (human recombinant), glucose, glucose, insulin aspart U-100, ondansetron, ramelteon, simethicone, sodium chloride 0.9%     Review of Systems  Objective:     Weight: 69.9 kg (154 lb)  Body mass index is 25.63 kg/m².  Vital Signs (Most Recent):  Temp: 99 °F (37.2 °C) (07/08/19 0700)  Pulse: 89 (07/08/19 0700)  Resp: (!) 22 (07/08/19 0700)  BP: (!) 159/69 (07/08/19 0700)  SpO2: 96 % (07/08/19 0700) Vital Signs (24h Range):  Temp:  [98.1 °F (36.7 °C)-99 °F (37.2 °C)] 99 °F (37.2 °C)  Pulse:  [61-89] 89  Resp:  [15-46] 22  SpO2:  [94 %-100 %] 96 %  BP: (120-175)/(56-80) 159/69  Arterial Line BP: ()/(40-82) 107/55     Date 07/08/19 0700 - 07/09/19 0659   Shift 6408-3913 4173-1620 5516-7373 24 Hour Total   INTAKE   I.V.(mL/kg) 75(1.1)   75(1.1)   Shift Total(mL/kg) 75(1.1)   75(1.1)   OUTPUT   Shift Total(mL/kg)       Weight (kg) 69.9 69.9 69.9 69.9                   Female External Urinary Catheter 07/04/19 1806 (Active)   Skin perineum cleansed w/ soap and water 7/8/2019  3:02 AM   Tolerance no signs/symptoms of discomfort 7/8/2019  3:02 AM   Suction Continuous suction at 50 mmHg 7/7/2019  7:01 AM   Date of last wick change 07/06/19 7/6/2019  9:00 PM   Time of last wick change 2100 7/6/2019  9:00 PM   Output (mL) 300 mL 7/8/2019  4:00 AM       Neurosurgery Physical Exam  General: AOx3, GCS E4V5M6  CNII-XII: Intact on fine exam, PERRL, EOMi, facial sensation preserved, no facial assymetry, tongue/uvula/palate midline, shoulder shrug equal, no pronator drift  Extremities: 5/5 motor throughout, sensorium  intact throughout, coordination intact throughout, DTRs 2+, no pathological reflexes  Significant Labs:  Recent Labs   Lab 07/07/19  0201 07/08/19  0205   * 100    141   K 3.7 3.9   * 107   CO2 23 24   BUN 23 14   CREATININE 0.8 0.6   CALCIUM 8.7 8.9   MG 2.2 1.9     Recent Labs   Lab 07/07/19  0201 07/08/19  0205   WBC 12.97* 10.65   HGB 11.0* 10.9*   HCT 35.3* 34.5*    264         Significant Diagnostics:  AM TCDs pending

## 2019-07-08 NOTE — PT/OT/SLP EVAL
Physical Therapy Evaluation     Patient Name: Gissel Jamison  MRN: [id number]   Diagnosis: Nontraumatic subarachnoid hemorrhage from anterior communicating artery    Recommendations:   Discharge Recommendations:  (anticipate home, will reassess in 7 days)   Discharge Equipment Recommendations: (TBD)   Barriers to Discharge: current therapy needs, fall risk, decreased caregiver assistance    Assessment:   Gissel Jamison is a 64 y.o. female admitted with a medical diagnosis of Nontraumatic subarachnoid hemorrhage from anterior communicating artery.  Prior to admit she was completely independent with mobility.  she now presents with the following impairments/functional limitations: impaired functional mobilty, gait instability, impaired balance, impaired self care skills.   She exhibited significant instability in standing and during OOB mobility.  Her OOB mobility is further impaired d/t her complaints of increasing headache and dizziness upon standing. Due to these impairments, she now requires moderate assistance with OOB mobility, but she is expected to progress.  Due to her anticipated LOS of 10-14 days, she is expected to progress well with therapy and return to her PLOF.  PT will reassess discharge needs and recommendations in 7 days and update medical team and CM if her balance impairments persist.       Problem List:  impaired functional mobilty, gait instability, impaired balance, impaired self care skills  Rehab Prognosis:  good.  The patient would benefit from acute skilled PT services to address these deficits and maximize their functional independence.    History:     Past Medical History:   Diagnosis Date    Asthma     Breast cancer     Fibromyalgia     Nontraumatic subarachnoid hemorrhage from anterior communicating artery 7/4/2019    63 yo with hx of breast cancer presents to the ED with complaints of intractable headache after a barium CT abdomen on 7/2. NSGY consulted for SAH found on CT head.  -  Neurologically stable on initial assessment. HH2F2  - Stat CTA head ordered - CT head @11am reviewed. Diffuse SAH within basal cisterns and midline falx. No evidence for hydrocephalus on this study. No mass effect or midline shift.     PONV (postoperative nausea and vomiting)     for general anesthesia       Past Surgical History:   Procedure Laterality Date    ANGIOGRAM-CEREBRAL, acomm aneurysm N/A 7/5/2019    Performed by Turner Mckeon MD at [facility] ZACHERY    Arm Surgery      torn tendon    BACK SURGERY      x2    HYSTERECTOMY      RENETTA    MASTECTOMY      with reconstruction left breast    OOPHORECTOMY      RADIOFREQUENCY THERMAL COAGULATION, NERVE, SPINAL, CERVICAL, POSTERIOR RAMUS, MEDIAL BRANCH Left 2/8/2019    Performed by Raymond Britton MD at [facility] OR    Tonsillectomy      TONSILLECTOMY         Subjective   Patient comments/goals: I couldn't stand because my headache gets worse when I stand up.   Pain/Comfort:  ·  Pain Rating 1: 3/10  · Pain Rating Post-Intervention 1: 6/10     Recent Vital Signs: (Last documentation)  Pulse: 78 (07/08/19 1600)  BP: (!) 175/73 (07/08/19 1600)  SpO2: 95 % (07/08/19 1600)     Living Environment:  Home: The patient lives in with her  in a 1 story home with no steps to enter.    PLOF:  Independent with mobility and driving, still working  DME owned: none    Assistance Available: Upon discharge, patient will have assistance from family.    Objective:   The patient had pulse ox (continuous), telemetry, blood pressure cuff, arterial line    General Precautions: aspiration, fall  Recent Surgery: Procedure(s) (LRB):  ANGIOGRAM-CEREBRAL, acomm aneurysm (N/A)    Recent Vital Signs:   Pulse: 78 (07/08/19 1600)  BP: (!) 175/73 (07/08/19 1600)  SpO2: 95 % (07/08/19 1600)    Physical Examination:   The patient was found supine in ICU in NAD with her  present.  She agreed to therapy and participated well.      Cognitive Function:  - Oriented to: person, place,  time and situation   - Level of Alertness: awake and alert  - Follows Commands/attention: Follows multistep  commands  - Communication: clear/fluent  - Safety awareness/insight to disability: intact  Musculoskeletal System  Upper Extremities:   PROM: WFL  Strength: WFL  Lower Extremities:  PROM: WFL  Strength: WFL  Cardiopulmonary System:   · HR: 89 bpm  · BP: 159/69  · SpO2: 96% on ra  Neuromuscular System:  · Sensation: intact LT per patient report  · Coordination: NT  Posture and gross symmetry: good alignment, balance impairments present  Vision:  NT    BALANCE:  Sitting: SBA  Headache increased to 5/10 in sitting  Standing: min to moderate assistance for increased postural sway and instability with no AD    FUNCTIONAL MOBILITY ASSESSMENT:  Bed Mobility: performed with HOB flat  · Rolling/Turning R: SBA  · Rolling/Turning L: SBA  · Supine > sit: SBA  · Sit > supine: NT  · Scooting EOB: CGA    Transfers:  · Sit <> stand transfer: min assistance for instability in initial standing   · Bed <> chair transfer: moderate assistance for increased postural sway, instability, and LOB    Gait:   Gait x 10 feet moderate assistance for LOB, increased postural sway and 1 standing rest break d/t nausea  · Patient demonstrated multidirectional LOB which coincided with increased c/o headache, dizziness and neausea  · She moved BLEs independently  · Comments: HA increased to 6/10 after gait    Therapeutic Activities, Education, or Exercises:  Therapist educated the patient and her  on the role of PT, POC, progress with mobility, goals, discharge planning, and level of assistance with transfers and Importance of progressive mobilization, out of bed positioning, and participation in therapy.  The therapist discussed the patients current mobility status, deficits, and level of assistance with RN.  Time was also provided for active listening,  therapeutic counseling and discussion of health disposition. The therapist answered  questions to patient/familys satisfaction within scope of practice.   White board updated to reflect current level of assistance.     FUNCTIONAL OUTCOME MEASURES:  [facility]  Turning over in bed (including adjusting bedclothes, sheets and blankets)?: 3  Sitting down on and standing up from a chair with arms (e.g., wheelchair, bedside commode, etc.): 3  Moving from lying on back to sitting on the side of the bed?: 3  Moving to and from a bed to a chair (including a wheelchair)?: 2  Need to walk in hospital room?: 2  Climbing 3-5 steps with a railing?: 1  Basic Mobility Total Score: 14    Goals:     Multidisciplinary Problems     Physical Therapy Goals        Problem: Physical Therapy Goal    Goal Priority Disciplines Outcome Goal Variances Interventions   Physical Therapy Goal     PT, PT/OT Ongoing (interventions implemented as appropriate)     Description:    Goals to be met by 7/18/2019    1. Pt will perform rolling to the R and L with SBA.   2. Pt will perform supine to sit from both sides of the bed with SBA.  3. Pt will perform sit to supine with SBA.  4. Pt will perform sit to stand transfers with SBA.    5. Pt will perform bed <> chair transfers with SBA.  6. Pt will perform gait x 150 feet with SBA with no AD                      Plan:   During this hospitalization, patient will be seen 4 x/week for gait training, therapeutic activities, therapeutic exercises, neuromuscular re-education to address impairments and functional mobility deficits.   · Plan of Care Expires: 08/06/19   Plan of Care Reviewed with: patient, spouse    This plan of care has been discussed with the patient and/or family who were involved in its development and are in agreement with the identified goals and treatment plan.     Clinical Decision Making:   COMPLEXITY OF PT EXAMINATION:  HISTORY  - Comorbidities that affect the PT plan of care or the patient's ability to participate in/progress with therapy (see above)  - Personal Factors:  Time since onset of injury, illness, diagnosis, or exacerbation  EXAMINATION  - Body Systems: Cognition: a gross assessment of communication ability, orientation, level of consciousness, awareness of deficits, language, assessment of ability to make needs known, expected emotional or behavioral responses, learning style or preferences, Neuromuscular System: a general assessment of gross coordinated movement (ie. coordination, gait, balance, transitions, transfers), motor control, motor learning, or visual-perceptual skills , Musculoskeletal System: the assessment of gross symmetry/posture, ROM, strength, spasticity, height, or weight and Cardiovascular System: the assessment of heart rate, blood pressure, respiratory rate, SPO2, endurance, or edema  - Activity or participation limitations: Status of current condition  and Medical acuity, medical complexity, or multiple lines/tubes/drains  CLINICAL PRESENTATION: Evolving/changing characteristics  - Vital sign response to activity or stimulation, Continuous ICU monitoring and Presence of multiple lines, tubes, drains, or other medically neccessary devices  LEVEL OF COMPLEXITY: Moderate Complexity: (1 or more apply) the patient has at least 1-2 personal factors or comorbidities that impact the plan of care; the examination addressed at least 3 body structures, functions, activity limitations, and/or participation restrictions; and the clinical presentation had evolving or changing characteristics    Time Tracking:   PT Received On:  07/08/19  PT Start Time:   1404    PT Stop Time:  1421  PT Total Time (min): 17 min      Billable Minutes: Evaluation 17    Cristy Craven, PT  7/8/2019  [phone] (pager)

## 2019-07-08 NOTE — PROGRESS NOTES
[facility]  Vascular Neurology  Comprehensive Stroke Center  Progress Note    Assessment/Plan:     * Nontraumatic subarachnoid hemorrhage from anterior communicating artery    65 yo with hx of breast cancer presents to the ED with complaints of intractable headache after a barium CT abdomen on 7/2. CT head on 7/4 with diffuse SAH within basal cisterns and midline- NSY was consulted. CTA on 7/4 revealed small anterior communicating artery aneurysm-. She was taken to IR on 7/5 and is now s/p coiling. Patient is admitted to NCC for close monitoring.     Antithrombotics: none (SAH)  Statins: none (SAH)  Aggressive risk factor modification:   Rehab efforts: PT/OT/SLP to eval and treat.   Diagnostics ordered/pending: daily TCDs  VTE prophylaxis: lovenox  BP parameters: secured aneurysm- keep SBP <200    Aneurysm of anterior communicating artery  S/p coiling- done on 7/4 7/6/19: NAEON. Patient now s/p coiling. Patient reporting improvement in headaches.   7/719: Neuro exam stable. Patient c/o difficulty sleeping and headache overnight. TCDs show increased velocities. Patient to continue PRN tylenol for HA and start ramelteon for sleep tonight.   07/08/2019 neuro exam states stable, complains of some non-constant headache worsening with moving. Some pain with lateral gaze.       STROKE DOCUMENTATION   Acute Stroke Times   Last Known Normal Date: 07/03/19  Last Known Normal Time: (unable to determine)  Symptom Onset Date: 07/03/19  Symptom Onset Time: (unclear)  Stroke Team Called Date: 07/05/19  Stroke Team Called Time: 0820  Stroke Team Arrival Date: 07/05/19  Stroke Team Arrival Time: 0825  CT Interpretation Time: 0825  Decision to Treat Time for Alteplase: (No iv alteplase candidate)  Decision to Treat Time for IR: 0825    NIH Scale:  1a. Level of Consciousness: 0-->Alert, keenly responsive  1b. LOC Questions: 0-->Answers both questions correctly  1c. LOC Commands: 0-->Performs both tasks  correctly  2. Best Gaze: 0-->Normal  3. Visual: 0-->No visual loss  4. Facial Palsy: 0-->Normal symmetrical movements  5a. Motor Arm, Left: 0-->No drift, limb holds 90 (or 45) degrees for full 10 secs  5b. Motor Arm, Right: 0-->No drift, limb holds 90 (or 45) degrees for full 10 secs  6a. Motor Leg, Left: 0-->No drift, leg holds 30 degree position for full 5 secs  6b. Motor Leg, Right: 0-->No drift, leg holds 30 degree position for full 5 secs  7. Limb Ataxia: 0-->Absent  8. Sensory: 0-->Normal, no sensory loss  9. Best Language: 0-->No aphasia, normal  10. Dysarthria: 0-->Normal  11. Extinction and Inattention (formerly Neglect): 0-->No abnormality  Total (NIH Stroke Scale): 0       Modified Mohsen Score: 0  Dino Coma Scale:    ABCD2 Score:    [id number] Score:   HAS -BLED Score:   ICH Score:0  Hunt & Rucker Classification:Grade I     Hemorrhagic change of an Ischemic Stroke: Does this patient have an ischemic stroke with hemorrhagic changes? No     Neurologic Chief Complaint: Headache, SAH    Subjective:     Interval History: Patient is seen for follow-up neurological assessment and treatment recommendations: neurological exam stays stable. Complains of intermittent headache along with mild nausea. No vomit. TCD daily.    HPI, Past Medical, Family, and Social History remains the same as documented in the initial encounter.     Review of Systems   Constitutional: Negative for fever.   HENT: Negative for trouble swallowing and voice change.    Eyes: Negative for visual disturbance.   Respiratory: Negative for shortness of breath.    Cardiovascular: Negative for palpitations.   Gastrointestinal: Positive for nausea.   Musculoskeletal: Negative for neck pain and neck stiffness.   Neurological: Positive for headaches. Negative for dizziness, tremors, seizures, facial asymmetry, speech difficulty, weakness and numbness.   Psychiatric/Behavioral: Negative for confusion.     Scheduled Meds:   enoxparin  40 mg  Subcutaneous Q24H    lactated ringers  500 mL Intravenous Once    montelukast  10 mg Oral Daily    niMODipine  60 mg Oral Q4H    pantoprazole  40 mg Oral BID     Continuous Infusions:   lactated ringers Stopped (07/08/19 0808)     PRN Meds:acetaminophen, Dextrose 10% Bolus, Dextrose 10% Bolus, glucagon (human recombinant), glucose, glucose, insulin aspart U-100, ondansetron, ramelteon, simethicone, sodium chloride 0.9%    Objective:     Vital Signs (Most Recent):  Temp: 99 °F (37.2 °C) (07/08/19 0700)  Pulse: 74 (07/08/19 0900)  Resp: 16 (07/08/19 0900)  BP: (!) 147/65 (07/08/19 0900)  SpO2: (!) 94 % (07/08/19 0900)  BP Location: Right arm    Vital Signs Range (Last 24H):  Temp:  [98.1 °F (36.7 °C)-99 °F (37.2 °C)]   Pulse:  [61-89]   Resp:  [15-46]   BP: (120-175)/(56-80)   SpO2:  [94 %-100 %]   Arterial Line BP: ()/(40-82)   BP Location: Right arm    Physical Exam   Neck: Normal range of motion. Neck supple.   Cardiovascular: Normal rate.   Pulmonary/Chest: Effort normal.   Musculoskeletal: Normal range of motion.   Skin: Capillary refill takes less than 2 seconds.   Nursing note and vitals reviewed.      Neurological Exam:   LOC: alert  Attention Span: Good   Language: No aphasia  Articulation: No dysarthria  Orientation: Person, Place, Time   Visual Fields: Full  EOM (CN III, IV, VI): Full/intact  Pupils (CN II, III): PERRL  Facial Sensation (CN V): Normal  Facial Movement (CN VII): Symmetric facial expression    Motor: Arm left  Normal 5/5  Leg left  Normal 5/5  Arm right  Normal 5/5  Leg right Normal 5/5  Cebellar: No evidence of appendicular or axial ataxia  Sensation: Intact to light touch, temperature and vibration      Diagnostic Results     Brain Imaging      CT head w/o contrast 07/04/2019  1. Subarachnoid hemorrhage.  In the setting of no trauma suspect aneurysm     Vessel Imaging      IR angio 07/05/2019  1. Anterior communicating artery aneurysm successfully embolized using a platinum  coil.    2.  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    3.  Distal embolism likely from the access catheter into the distal branches of the anterior middle cerebral artery on the left side treated successfully with intra-arterial Integrilin     CTA head 07/04/2019  Small anterior communicating artery aneurysm likely the origin of the recent subarachnoid bleed.    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 The estimated ejection fraction is 63%  · Concentric left ventricular remodeling.  · No wall motion abnormalities.  · Normal LV diastolic function.  · Normal right ventricular systolic function.  · Mild tricuspid regurgitation.  · Normal central venous pressure (3 mm Hg).  · The estimated PA systolic pressure is 22 mm Hg         Gianna Rice MD  Comprehensive Stroke Center  Department of Vascular Neurology   [facility]

## 2019-07-08 NOTE — PLAN OF CARE
Problem: Adult Inpatient Plan of Care  Goal: Plan of Care Review  Outcome: Ongoing (interventions implemented as appropriate)  POC reviewed with pt and family at 1400. Pt and family verbalized understanding. Questions and concerns addressed. No acute events today. Pt progressing toward goals. Intermittent nausea throughout shift. In chair throughout afternoon. Intermittent headache, worsens with activity. 500CC LR bolus given, LR continued at 100cc/hr. Mg replaced. Plan for midline tomorrow. Will continue to monitor. See flowsheets for full assessment and VS info.

## 2019-07-08 NOTE — PLAN OF CARE
Problem: Adult Inpatient Plan of Care  Goal: Plan of Care Review  Outcome: Ongoing (interventions implemented as appropriate)  POC reviewed with pt at 0400. Pt verbalized understanding. Questions and concerns addressed. No acute events overnight. Pt progressing toward goals. Will continue to monitor. See flowsheets for full assessment and VS info

## 2019-07-08 NOTE — PLAN OF CARE
Problem: Physical Therapy Goal  Goal: Physical Therapy Goal  Outcome: Ongoing (interventions implemented as appropriate)  Initial eval completed.  Results, POC, and therapy recommendations discussed with patient and her .   Complete evaluation documentation to follow.    Mobility Recommendations: 1 person assist transfers  D/c recommendations: anticipate home, 10-14 day hospitalization for vasospasm watch     Cristy Craven, PT  7/8/2019  [phone] (pager)

## 2019-07-08 NOTE — HPI
64 y F with medical h.o breast cancer s/p mastectomy, lumpectomy, who presented to the ER as a transfer from [facility] as SAH detected on CTH.  Patient's symptoms started on Tuesday 7/2 when she was undergoing a CT abdomen for evaluation of indigestion. She started to feel a pounding and congestion of head and ears. She did not seek medical attention till today 7/4 when she went to urgent care. Urgent care doc did not feel comfortable treating it as migraine and sent patient to ER; where CT head detected SAH. She is transferred here to [facility].  Here a CTA detected [place] aneurysm and patient is being sent for angio and further neurosurg intervention,.    Interval history: ruptured ACOM aneurysm now s/p coiling 7/5.  [facility]  On 7/14, stepped back up to Neuro ICU for concern for vasospasm  7/17 No significant events over night. TCD daily no vasospasms noted.

## 2019-07-08 NOTE — SUBJECTIVE & OBJECTIVE
Interval History: naeon    Medications:  Continuous Infusions:   lactated ringers Stopped (07/08/19 0808)     Scheduled Meds:   enoxparin  40 mg Subcutaneous Q24H    lactated ringers  500 mL Intravenous Once    montelukast  10 mg Oral Daily    niMODipine  60 mg Oral Q4H    pantoprazole  40 mg Oral BID     PRN Meds:acetaminophen, Dextrose 10% Bolus, Dextrose 10% Bolus, glucagon (human recombinant), glucose, glucose, insulin aspart U-100, ondansetron, ramelteon, simethicone, sodium chloride 0.9%     Review of Systems  Objective:     Weight: 69.9 kg (154 lb)  Body mass index is 25.63 kg/m².  Vital Signs (Most Recent):  Temp: 99 °F (37.2 °C) (07/08/19 0700)  Pulse: 74 (07/08/19 0900)  Resp: 16 (07/08/19 0900)  BP: (!) 147/65 (07/08/19 0900)  SpO2: (!) 94 % (07/08/19 0900) Vital Signs (24h Range):  Temp:  [98.1 °F (36.7 °C)-99 °F (37.2 °C)] 99 °F (37.2 °C)  Pulse:  [61-89] 74  Resp:  [15-46] 16  SpO2:  [94 %-100 %] 94 %  BP: (120-175)/(56-80) 147/65  Arterial Line BP: ()/(40-82) 100/46     Date 07/08/19 0700 - 07/09/19 0659   Shift 5359-9043 9574-7293 7358-7405 24 Hour Total   INTAKE   P.O. 240   240   I.V.(mL/kg) 163.3(2.3)   163.3(2.3)   IV Piggyback 500   500   Shift Total(mL/kg) 903.3(12.9)   903.3(12.9)   OUTPUT   Urine(mL/kg/hr) 250   250   Shift Total(mL/kg) 250(3.6)   250(3.6)   Weight (kg) 69.9 69.9 69.9 69.9                   Female External Urinary Catheter 07/04/19 1806 (Active)   Skin perineum cleansed w/ soap and water 7/8/2019  7:00 AM   Tolerance no signs/symptoms of discomfort 7/8/2019  7:00 AM   Suction Continuous suction at 40 mmHg 7/8/2019  7:00 AM   Date of last wick change 07/06/19 7/8/2019  7:00 AM   Time of last wick change 2100 7/8/2019  7:00 AM   Output (mL) 250 mL 7/8/2019  8:00 AM       Neurosurgery Physical Exam   AOX3, speech fluent  PERRL, face symm  CAMPBELL symm  SILT  No drift    Significant Labs:  Recent Labs   Lab 07/07/19  0201 07/08/19  0205   * 100    141   K 3.7  3.9   * 107   CO2 23 24   BUN 23 14   CREATININE 0.8 0.6   CALCIUM 8.7 8.9   MG 2.2 1.9     Recent Labs   Lab 07/07/19  0201 07/08/19  0205   WBC 12.97* 10.65   HGB 11.0* 10.9*   HCT 35.3* 34.5*    264     No results for input(s): LABPT, INR, APTT in the last 48 hours.  Microbiology Results (last 7 days)     ** No results found for the last 168 hours. **            Significant Diagnostics:

## 2019-07-08 NOTE — SUBJECTIVE & OBJECTIVE
Neurologic Chief Complaint: Headache, SAH    Subjective:     Interval History: Patient is seen for follow-up neurological assessment and treatment recommendations: neurological exam stays stable. Complains of intermittent headache along with mild nausea. No vomit. TCD daily.    HPI, Past Medical, Family, and Social History remains the same as documented in the initial encounter.     Review of Systems   Constitutional: Negative for fever.   HENT: Negative for trouble swallowing and voice change.    Eyes: Negative for visual disturbance.   Respiratory: Negative for shortness of breath.    Cardiovascular: Negative for palpitations.   Gastrointestinal: Positive for nausea.   Musculoskeletal: Negative for neck pain and neck stiffness.   Neurological: Positive for headaches. Negative for dizziness, tremors, seizures, facial asymmetry, speech difficulty, weakness and numbness.   Psychiatric/Behavioral: Negative for confusion.     Scheduled Meds:   enoxparin  40 mg Subcutaneous Q24H    lactated ringers  500 mL Intravenous Once    montelukast  10 mg Oral Daily    niMODipine  60 mg Oral Q4H    pantoprazole  40 mg Oral BID     Continuous Infusions:   lactated ringers Stopped (07/08/19 0808)     PRN Meds:acetaminophen, Dextrose 10% Bolus, Dextrose 10% Bolus, glucagon (human recombinant), glucose, glucose, insulin aspart U-100, ondansetron, ramelteon, simethicone, sodium chloride 0.9%    Objective:     Vital Signs (Most Recent):  Temp: 99 °F (37.2 °C) (07/08/19 0700)  Pulse: 74 (07/08/19 0900)  Resp: 16 (07/08/19 0900)  BP: (!) 147/65 (07/08/19 0900)  SpO2: (!) 94 % (07/08/19 0900)  BP Location: Right arm    Vital Signs Range (Last 24H):  Temp:  [98.1 °F (36.7 °C)-99 °F (37.2 °C)]   Pulse:  [61-89]   Resp:  [15-46]   BP: (120-175)/(56-80)   SpO2:  [94 %-100 %]   Arterial Line BP: ()/(40-82)   BP Location: Right arm    Physical Exam   Neck: Normal range of motion. Neck supple.   Cardiovascular: Normal rate.    Pulmonary/Chest: Effort normal.   Musculoskeletal: Normal range of motion.   Skin: Capillary refill takes less than 2 seconds.   Nursing note and vitals reviewed.      Neurological Exam:   LOC: alert  Attention Span: Good   Language: No aphasia  Articulation: No dysarthria  Orientation: Person, Place, Time   Visual Fields: Full  EOM (CN III, IV, VI): Full/intact  Pupils (CN II, III): PERRL  Facial Sensation (CN V): Normal  Facial Movement (CN VII): Symmetric facial expression    Motor: Arm left  Normal 5/5  Leg left  Normal 5/5  Arm right  Normal 5/5  Leg right Normal 5/5  Cebellar: No evidence of appendicular or axial ataxia  Sensation: Intact to light touch, temperature and vibration      Diagnostic Results     Brain Imaging      CT head w/o contrast 07/04/2019  1. Subarachnoid hemorrhage.  In the setting of no trauma suspect aneurysm     Vessel Imaging      IR angio 07/05/2019  1. 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    2.  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    3.  Distal embolism likely from the access catheter into the distal branches of the anterior middle cerebral artery on the left side treated successfully with intra-arterial Integrilin     CTA head 07/04/2019  Small anterior communicating artery aneurysm likely the origin of the recent subarachnoid bleed.    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 The estimated ejection fraction is 63%  · Concentric left ventricular remodeling.  · No wall motion abnormalities.  · Normal LV diastolic function.  · Normal right ventricular systolic function.  · Mild tricuspid regurgitation.  · Normal central venous pressure (3 mm Hg).  · The estimated PA systolic pressure is 22 mm Hg

## 2019-07-08 NOTE — ASSESSMENT & PLAN NOTE
63 yo with hx of breast cancer presents to the ED with complaints of intractable headache after a barium CT abdomen on 7/2. CT head on 7/4 with diffuse SAH within basal cisterns and midline- NSY was consulted. CTA on 7/4 revealed small anterior communicating artery aneurysm-. She was taken to IR on 7/5 and is now s/p coiling. Patient is admitted to [facility] for close monitoring.     Antithrombotics: none (SAH)  Statins: none (SAH)  Aggressive risk factor modification:   Rehab efforts: PT/OT/SLP to eval and treat.   Diagnostics ordered/pending: daily TCDs  VTE prophylaxis: lovenox  BP parameters: secured aneurysm- keep SBP <200

## 2019-07-08 NOTE — PROGRESS NOTES
[facility]  Neurosurgery  Progress Note    Subjective:     History of Present Illness: 63 yo with hx of breast cancer presents to the ED with complaints of headache after a barium CT abdomen on Tuesday. Reports intractable headache which began 2 days ago. Associated n/v, neck pain. Denies visual changes, weakness, paresthesias, b/b dysfunction. No trauma, falls, or LOC. She does not take antiplatelet therapy or anticoagulants. Currently on a cardene drip started at the outside hospital. She states she is tired from the morphine given prior to transport. Family at bedside.       Post-Op Info:  Procedure(s) (LRB):  ANGIOGRAM-CEREBRAL, acomm aneurysm (N/A)   3 Days Post-Op     Interval History: PBD 5 NAEON. Neuro exam unchanged.     Medications:  Continuous Infusions:   lactated ringers 75 mL/hr at 07/08/19 0700     Scheduled Meds:   enoxparin  40 mg Subcutaneous Q24H    lactated ringers  500 mL Intravenous Once    montelukast  10 mg Oral Daily    niMODipine  60 mg Oral Q4H    pantoprazole  40 mg Oral BID     PRN Meds:acetaminophen, Dextrose 10% Bolus, Dextrose 10% Bolus, glucagon (human recombinant), glucose, glucose, insulin aspart U-100, ondansetron, ramelteon, simethicone, sodium chloride 0.9%     Review of Systems  Objective:     Weight: 69.9 kg (154 lb)  Body mass index is 25.63 kg/m².  Vital Signs (Most Recent):  Temp: 99 °F (37.2 °C) (07/08/19 0700)  Pulse: 89 (07/08/19 0700)  Resp: (!) 22 (07/08/19 0700)  BP: (!) 159/69 (07/08/19 0700)  SpO2: 96 % (07/08/19 0700) Vital Signs (24h Range):  Temp:  [98.1 °F (36.7 °C)-99 °F (37.2 °C)] 99 °F (37.2 °C)  Pulse:  [61-89] 89  Resp:  [15-46] 22  SpO2:  [94 %-100 %] 96 %  BP: (120-175)/(56-80) 159/69  Arterial Line BP: ()/(40-82) 107/55     Date 07/08/19 0700 - 07/09/19 0659   Shift 7177-0111 1819-5388 0790-4285 24 Hour Total   INTAKE   I.V.(mL/kg) 75(1.1)   75(1.1)   Shift Total(mL/kg) 75(1.1)   75(1.1)   OUTPUT   Shift Total(mL/kg)        Weight (kg) 69.9 69.9 69.9 69.9                   Female External Urinary Catheter 07/04/19 1806 (Active)   Skin perineum cleansed w/ soap and water 7/8/2019  3:02 AM   Tolerance no signs/symptoms of discomfort 7/8/2019  3:02 AM   Suction Continuous suction at 50 mmHg 7/7/2019  7:01 AM   Date of last wick change 07/06/19 7/6/2019  9:00 PM   Time of last wick change 2100 7/6/2019  9:00 PM   Output (mL) 300 mL 7/8/2019  4:00 AM       Neurosurgery Physical Exam  General: AOx3, GCS [id number]  CNII-XII: Intact on fine exam, PERRL, EOMi, facial sensation preserved, no facial assymetry, tongue/uvula/palate midline, shoulder shrug equal, no pronator drift  Extremities: 5/5 motor throughout, sensorium intact throughout, coordination intact throughout, DTRs 2+, no pathological reflexes  Significant Labs:  Recent Labs   Lab 07/07/19  0201 07/08/19  0205   * 100    141   K 3.7 3.9   * 107   CO2 23 24   BUN 23 14   CREATININE 0.8 0.6   CALCIUM 8.7 8.9   MG 2.2 1.9     Recent Labs   Lab 07/07/19  0201 07/08/19  0205   WBC 12.97* 10.65   HGB 11.0* 10.9*   HCT 35.3* 34.5*    264         Significant Diagnostics:  AM TCDs pending    Assessment/Plan:     Aneurysm of anterior communicating artery  64 F who presented with severe HA, neck pain, vomiting and found to have HH2 F2 SAH secondary to ruptured ACOM aneurysm now s/p coiling 7/5:    PBD 5 PCD3    Neuro:  --Patient admitted to Neuro-ICU; preponderance of medical care per NCC      -Q1h neurochecks while in ICU  --HOB@30  --Keppra 500 BID x7d  --TCDs daily x14d  --Nimotop 60q4 x 21d  --Continue to monitor   --Continue to follow clinically and notify NSGY immediately if any neurostatus change    CVS:  ---220 mmHg (cardene ggt; hydralazine & labetalol PRN; transition to home meds when appropriate per NCC)  --Echo normal    Pulm:  --Recc CXR for baseline resp status  --Aggressive pulm treatment per NCC    GIT/Electrolytes:  --CMP daily      -Replete  electrolytes PRN per NCC  --Na goal >135  --Recc Utox/UA    Renal:  --Strict I/Os  --Goal of euvolemia or net +1L - net negative today, will bolus 500cc and increase MIVF to 100/hr    Heme/ID:  --Daily CBC      -Transfuse PRN  --Trend WBC and T    PPx:  --TEDs/SCDs  --SQH appropriate once aneurysm secured  --PPI    Dispo: Continued ICU care at this time, PT/OT when appropriate per NCC, CM/SW consult for dispo planning        Lizbeth Packer MD  Neurosurgery  [facility]

## 2019-07-08 NOTE — PLAN OF CARE
07/08/19 1555   Discharge Reassessment   Assessment Type Discharge Planning Reassessment   Provided patient/caregiver education on the expected discharge date and the discharge plan No   Do you have any problems affording any of your prescribed medications? TBD   Discharge Plan A Home with family   Discharge Plan B Home Health   DME Needed Upon Discharge  none   Patient choice form signed by patient/caregiver N/A   Anticipated Discharge Disposition Home   Can the patient answer the patient profile reliably? Yes, cognitively intact   How does the patient rate their overall health at the present time? Good   Describe the patient's ability to walk at the present time. Minor restrictions or changes   How often would a person be available to care for the patient? Often   Number of comorbid conditions (as recorded on the chart) None   During the past month, has the patient often been bothered by feeling down, depressed or hopeless? No   During the past month, has the patient often been bothered by little interest or pleasure in doing things? No   Post-Acute Status   Post-Acute Authorization Other   Post-Acute Placement Status Awaiting Internal Medical Clearance   Other Status No Post-Acute Service Needs   Discharge Delays None known at this time     Katie Munroe RN, CCRN-K, [facility]  Neuro-Critical Care   [phone]

## 2019-07-09 LAB
ALBUMIN SERPL BCP-MCNC: 2.8 G/DL (ref 3.5–5.2)
ALP SERPL-CCNC: 110 U/L (ref 55–135)
ALT SERPL W/O P-5'-P-CCNC: 46 U/L (ref 10–44)
ANION GAP SERPL CALC-SCNC: 10 MMOL/L (ref 8–16)
AST SERPL-CCNC: 43 U/L (ref 10–40)
BASOPHILS # BLD AUTO: 0.04 K/UL (ref 0–0.2)
BASOPHILS NFR BLD: 0.4 % (ref 0–1.9)
BILIRUB SERPL-MCNC: 0.5 MG/DL (ref 0.1–1)
BUN SERPL-MCNC: 11 MG/DL (ref 8–23)
CALCIUM SERPL-MCNC: 8.8 MG/DL (ref 8.7–10.5)
CHLORIDE SERPL-SCNC: 104 MMOL/L (ref 95–110)
CO2 SERPL-SCNC: 24 MMOL/L (ref 23–29)
CREAT SERPL-MCNC: 0.7 MG/DL (ref 0.5–1.4)
DIFFERENTIAL METHOD: ABNORMAL
EOSINOPHIL # BLD AUTO: 0.1 K/UL (ref 0–0.5)
EOSINOPHIL NFR BLD: 1.4 % (ref 0–8)
ERYTHROCYTE [DISTWIDTH] IN BLOOD BY AUTOMATED COUNT: 13.4 % (ref 11.5–14.5)
EST. GFR  (AFRICAN AMERICAN): >60 ML/MIN/1.73 M^2
EST. GFR  (NON AFRICAN AMERICAN): >60 ML/MIN/1.73 M^2
GLUCOSE SERPL-MCNC: 105 MG/DL (ref 70–110)
HCT VFR BLD AUTO: 34.5 % (ref 37–48.5)
HGB BLD-MCNC: 10.6 G/DL (ref 12–16)
IMM GRANULOCYTES # BLD AUTO: 0.07 K/UL (ref 0–0.04)
IMM GRANULOCYTES NFR BLD AUTO: 0.7 % (ref 0–0.5)
LYMPHOCYTES # BLD AUTO: 2.9 K/UL (ref 1–4.8)
LYMPHOCYTES NFR BLD: 28.9 % (ref 18–48)
MAGNESIUM SERPL-MCNC: 1.9 MG/DL (ref 1.6–2.6)
MCH RBC QN AUTO: 27.5 PG (ref 27–31)
MCHC RBC AUTO-ENTMCNC: 30.7 G/DL (ref 32–36)
MCV RBC AUTO: 90 FL (ref 82–98)
MONOCYTES # BLD AUTO: 0.9 K/UL (ref 0.3–1)
MONOCYTES NFR BLD: 8.6 % (ref 4–15)
NEUTROPHILS # BLD AUTO: 6.1 K/UL (ref 1.8–7.7)
NEUTROPHILS NFR BLD: 60 % (ref 38–73)
NRBC BLD-RTO: 0 /100 WBC
PHOSPHATE SERPL-MCNC: 4.4 MG/DL (ref 2.7–4.5)
PLATELET # BLD AUTO: 236 K/UL (ref 150–350)
PMV BLD AUTO: 9.5 FL (ref 9.2–12.9)
POCT GLUCOSE: 113 MG/DL (ref 70–110)
POCT GLUCOSE: 124 MG/DL (ref 70–110)
POCT GLUCOSE: 88 MG/DL (ref 70–110)
POTASSIUM SERPL-SCNC: 3.8 MMOL/L (ref 3.5–5.1)
POTASSIUM SERPL-SCNC: 4.1 MMOL/L (ref 3.5–5.1)
PROT SERPL-MCNC: 5.8 G/DL (ref 6–8.4)
RBC # BLD AUTO: 3.85 M/UL (ref 4–5.4)
SODIUM SERPL-SCNC: 138 MMOL/L (ref 136–145)
WBC # BLD AUTO: 10.11 K/UL (ref 3.9–12.7)

## 2019-07-09 PROCEDURE — 99233 SBSQ HOSP IP/OBS HIGH 50: CPT | Mod: ,,, | Performed by: PSYCHIATRY & NEUROLOGY

## 2019-07-09 PROCEDURE — 97530 THERAPEUTIC ACTIVITIES: CPT

## 2019-07-09 PROCEDURE — 25000003 PHARM REV CODE 250: Performed by: PSYCHIATRY & NEUROLOGY

## 2019-07-09 PROCEDURE — 25000003 PHARM REV CODE 250: Performed by: UROLOGY

## 2019-07-09 PROCEDURE — 97116 GAIT TRAINING THERAPY: CPT

## 2019-07-09 PROCEDURE — 20000000 HC ICU ROOM

## 2019-07-09 PROCEDURE — 99233 PR SUBSEQUENT HOSPITAL CARE,LEVL III: ICD-10-PCS | Mod: ,,, | Performed by: PSYCHIATRY & NEUROLOGY

## 2019-07-09 PROCEDURE — 84100 ASSAY OF PHOSPHORUS: CPT

## 2019-07-09 PROCEDURE — 83735 ASSAY OF MAGNESIUM: CPT

## 2019-07-09 PROCEDURE — 84132 ASSAY OF SERUM POTASSIUM: CPT

## 2019-07-09 PROCEDURE — 94761 N-INVAS EAR/PLS OXIMETRY MLT: CPT

## 2019-07-09 PROCEDURE — 63600175 PHARM REV CODE 636 W HCPCS: Performed by: NURSE PRACTITIONER

## 2019-07-09 PROCEDURE — 25000003 PHARM REV CODE 250: Performed by: STUDENT IN AN ORGANIZED HEALTH CARE EDUCATION/TRAINING PROGRAM

## 2019-07-09 PROCEDURE — 85025 COMPLETE CBC W/AUTO DIFF WBC: CPT

## 2019-07-09 PROCEDURE — 63600175 PHARM REV CODE 636 W HCPCS: Performed by: PSYCHIATRY & NEUROLOGY

## 2019-07-09 PROCEDURE — 80053 COMPREHEN METABOLIC PANEL: CPT

## 2019-07-09 RX ADMIN — NIMODIPINE 60 MG: 30 CAPSULE, LIQUID FILLED ORAL at 10:07

## 2019-07-09 RX ADMIN — MONTELUKAST SODIUM 10 MG: 10 TABLET, COATED ORAL at 08:07

## 2019-07-09 RX ADMIN — NIMODIPINE 60 MG: 30 CAPSULE, LIQUID FILLED ORAL at 05:07

## 2019-07-09 RX ADMIN — ONDANSETRON 4 MG: 2 INJECTION INTRAMUSCULAR; INTRAVENOUS at 04:07

## 2019-07-09 RX ADMIN — GABAPENTIN 100 MG: 100 CAPSULE ORAL at 04:07

## 2019-07-09 RX ADMIN — PANTOPRAZOLE SODIUM 40 MG: 40 TABLET, DELAYED RELEASE ORAL at 08:07

## 2019-07-09 RX ADMIN — NIMODIPINE 60 MG: 30 CAPSULE, LIQUID FILLED ORAL at 09:07

## 2019-07-09 RX ADMIN — NIMODIPINE 60 MG: 30 CAPSULE, LIQUID FILLED ORAL at 01:07

## 2019-07-09 RX ADMIN — GABAPENTIN 100 MG: 100 CAPSULE ORAL at 08:07

## 2019-07-09 RX ADMIN — FLUDROCORTISONE ACETATE 100 MCG: 0.1 TABLET ORAL at 08:07

## 2019-07-09 RX ADMIN — GABAPENTIN 100 MG: 100 CAPSULE ORAL at 09:07

## 2019-07-09 RX ADMIN — ONDANSETRON 4 MG: 2 INJECTION INTRAMUSCULAR; INTRAVENOUS at 10:07

## 2019-07-09 RX ADMIN — NIMODIPINE 60 MG: 30 CAPSULE, LIQUID FILLED ORAL at 02:07

## 2019-07-09 RX ADMIN — POTASSIUM CHLORIDE 40 MEQ: 20 SOLUTION ORAL at 05:07

## 2019-07-09 RX ADMIN — FLUDROCORTISONE ACETATE 100 MCG: 0.1 TABLET ORAL at 09:07

## 2019-07-09 RX ADMIN — ENOXAPARIN SODIUM 40 MG: 100 INJECTION SUBCUTANEOUS at 04:07

## 2019-07-09 RX ADMIN — PANTOPRAZOLE SODIUM 40 MG: 40 TABLET, DELAYED RELEASE ORAL at 09:07

## 2019-07-09 NOTE — PROGRESS NOTES
[facility]  Neurosurgery  Progress Note    Subjective:     History of Present Illness: 65 yo with hx of breast cancer presents to the ED with complaints of headache after a barium CT abdomen on Tuesday. Reports intractable headache which began 2 days ago. Associated n/v, neck pain. Denies visual changes, weakness, paresthesias, b/b dysfunction. No trauma, falls, or LOC. She does not take antiplatelet therapy or anticoagulants. Currently on a cardene drip started at the outside hospital. She states she is tired from the morphine given prior to transport. Family at bedside.       Post-Op Info:  Procedure(s) (LRB):  ANGIOGRAM-CEREBRAL, acomm aneurysm (N/A)   4 Days Post-Op     Interval History: naeon    Medications:  Continuous Infusions:   lactated ringers Stopped (07/08/19 0808)     Scheduled Meds:   enoxparin  40 mg Subcutaneous Q24H    lactated ringers  500 mL Intravenous Once    montelukast  10 mg Oral Daily    niMODipine  60 mg Oral Q4H    pantoprazole  40 mg Oral BID     PRN Meds:acetaminophen, Dextrose 10% Bolus, Dextrose 10% Bolus, glucagon (human recombinant), glucose, glucose, insulin aspart U-100, ondansetron, ramelteon, simethicone, sodium chloride 0.9%     Review of Systems  Objective:     Weight: 69.9 kg (154 lb)  Body mass index is 25.63 kg/m².  Vital Signs (Most Recent):  Temp: 99 °F (37.2 °C) (07/08/19 0700)  Pulse: 74 (07/08/19 0900)  Resp: 16 (07/08/19 0900)  BP: (!) 147/65 (07/08/19 0900)  SpO2: (!) 94 % (07/08/19 0900) Vital Signs (24h Range):  Temp:  [98.1 °F (36.7 °C)-99 °F (37.2 °C)] 99 °F (37.2 °C)  Pulse:  [61-89] 74  Resp:  [15-46] 16  SpO2:  [94 %-100 %] 94 %  BP: (120-175)/(56-80) 147/65  Arterial Line BP: ()/(40-82) 100/46     Date 07/08/19 0700 - 07/09/19 0659   Shift 4700-6124 6942-6383 7874-8768 24 Hour Total   INTAKE   P.O. 240   240   I.V.(mL/kg) 163.3(2.3)   163.3(2.3)   IV Piggyback 500   500   Shift Total(mL/kg) 903.3(12.9)   903.3(12.9)   OUTPUT    Urine(mL/kg/hr) 250   250   Shift Total(mL/kg) 250(3.6)   250(3.6)   Weight (kg) 69.9 69.9 69.9 69.9                   Female External Urinary Catheter 07/04/19 1806 (Active)   Skin perineum cleansed w/ soap and water 7/8/2019  7:00 AM   Tolerance no signs/symptoms of discomfort 7/8/2019  7:00 AM   Suction Continuous suction at 40 mmHg 7/8/2019  7:00 AM   Date of last wick change 07/06/19 7/8/2019  7:00 AM   Time of last wick change 2100 7/8/2019  7:00 AM   Output (mL) 250 mL 7/8/2019  8:00 AM       Neurosurgery Physical Exam   AOX3, speech fluent  PERRL, face symm  CAMPBELL symm  SILT  No drift    Significant Labs:  Recent Labs   Lab 07/07/19  0201 07/08/19  0205   * 100    141   K 3.7 3.9   * 107   CO2 23 24   BUN 23 14   CREATININE 0.8 0.6   CALCIUM 8.7 8.9   MG 2.2 1.9     Recent Labs   Lab 07/07/19  0201 07/08/19  0205   WBC 12.97* 10.65   HGB 11.0* 10.9*   HCT 35.3* 34.5*    264     No results for input(s): LABPT, INR, APTT in the last 48 hours.  Microbiology Results (last 7 days)     ** No results found for the last 168 hours. **            Significant Diagnostics:      Assessment/Plan:     Aneurysm of anterior communicating artery  64 F who presented with severe HA, neck pain, vomiting and found to have HH2 F2 SAH secondary to ruptured ACOM aneurysm now s/p coiling 7/5:    PBD 6 PCD4    Neuro:  --Patient admitted to Neuro-ICU; preponderance of medical care per NCC      -Q1h neurochecks while in ICU  --HOB@30  --Keppra 500 BID x7d  --TCDs daily x14d  --Nimotop 60q4 x 21d  --Continue to monitor   --Continue to follow clinically and notify NSGY immediately if any neurostatus change    CVS:  ---220 mmHg (cardene ggt; hydralazine & labetalol PRN; transition to home meds when appropriate per NCC)  --Echo normal    Pulm:  --Recc CXR for baseline resp status  --Aggressive pulm treatment per NCC    GIT/Electrolytes:  --CMP daily      -Replete electrolytes PRN per NCC  --Na goal  >135  --Recc Utox/UA    Renal:  --Strict I/Os  --Goal of euvolemia or net +1L - net negative today, will bolus 500cc and increase MIVF to 100/hr    Heme/ID:  --Daily CBC      -Transfuse PRN  --Trend WBC and T    PPx:  --TEDs/SCDs  --SQH appropriate once aneurysm secured  --PPI    Dispo: Continued ICU care at this time, PT/OT when appropriate per NCC, will consider stepping down to floor later this week.        Wilfredo Mccartney, DO  Neurosurgery  [facility]

## 2019-07-09 NOTE — ASSESSMENT & PLAN NOTE
63 yo with hx of breast cancer presents to the ED with complaints of intractable headache after a barium CT abdomen on 7/2. CT head on 7/4 with diffuse SAH within basal cisterns and midline- NSY was consulted. CTA on 7/4 revealed small anterior communicating artery aneurysm-. She was taken to IR on 7/5 and is now s/p coiling. Patient is admitted to [facility] for close monitoring.     Antithrombotics: none (SAH)  Statins: none (SAH)  Aggressive risk factor modification:   Rehab efforts: PT/OT/SLP to eval and treat.   Diagnostics ordered/pending: daily TCDs  VTE prophylaxis: lovenox  BP parameters: secured aneurysm- keep SBP <200

## 2019-07-09 NOTE — PLAN OF CARE
Problem: Physical Therapy Goal  Goal: Physical Therapy Goal    Goals to be met by 7/18/2019    1. Pt will perform rolling to the R and L with SBA. - MET  2. Pt will perform supine to sit from both sides of the bed with SBA. - MET  3. Pt will perform sit to supine with SBA. - MET  4. Pt will perform sit to stand transfers with SBA.    5. Pt will perform bed <> chair transfers with SBA.  6. Pt will perform gait x 150 feet with SBA with no AD     Outcome: Ongoing (interventions implemented as appropriate)  Patient participated well in therapy despite headache.  POC and goals were reviewed and remain appropriate.  Please refer to the progress note for functional mobility and activities performed during the treatment session.     Mobility: Pt is safe to perform transfers and gait within room with nursing using 1 person assist.      Cristy Craven, PT  7/9/2019  [phone] (pager)

## 2019-07-09 NOTE — ASSESSMENT & PLAN NOTE
64 F who presented with severe HA, neck pain, vomiting and found to have HH2 F2 SAH secondary to ruptured ACOM aneurysm now s/p coiling 7/5:    PBD 6 PCD4    Neuro:  --Patient admitted to Neuro-ICU; preponderance of medical care per NCC      -Q1h neurochecks while in ICU  --HOB@30  --Keppra 500 BID x7d  --TCDs daily x14d  --Nimotop 60q4 x 21d  --Continue to monitor   --Continue to follow clinically and notify NSGY immediately if any neurostatus change    CVS:  ---220 mmHg (cardene ggt; hydralazine & labetalol PRN; transition to home meds when appropriate per NCC)  --Echo normal    Pulm:  --Recc CXR for baseline resp status  --Aggressive pulm treatment per NCC    GIT/Electrolytes:  --CMP daily      -Replete electrolytes PRN per NCC  --Na goal >135  --Recc Utox/UA    Renal:  --Strict I/Os  --Goal of euvolemia or net +1L - net negative today, will bolus 500cc and increase MIVF to 100/hr    Heme/ID:  --Daily CBC      -Transfuse PRN  --Trend WBC and T    PPx:  --TEDs/SCDs  --SQH appropriate once aneurysm secured  --PPI    Dispo: Continued ICU care at this time, PT/OT when appropriate per NCC, will consider stepping down to floor later this week.

## 2019-07-09 NOTE — PROGRESS NOTES
[facility]  Neurocritical Care  Progress Note    Admit Date: 7/4/2019  Service Date: 07/09/2019  Length of Stay: 5    Subjective:     Chief Complaint: Nontraumatic subarachnoid hemorrhage from anterior communicating artery    History of Present Illness: 64 y F with medical h.o breast cancer s/p mastectomy, lumpectomy, who presented to the ER as a transfer from [facility] as SAH detected on CTH.  Patient's symptoms started on Tuesday 7/2 when she was undergoing a CT abdomen for evaluation of indigestion. She started to feel a pounding and congestion of head and ears. She did not seek medical attention till today 7/4 when she went to urgent care. Urgent care doc did not feel comfortable treating it as migraine and sent patient to ER; where CT head detected SAH. She is transferred here to [facility].  Here a CTA detected CAROLINA aneurysm and patient is being sent for angio and further neurosurg intervention,.    Hospital Course: 07/05/2019 NAEO. For conventional angiogram today ACOM aneurysm on CTA  7/7/19: add lovenox for DVT proph., initiate IVF, PT/OT, liberalize BP <200  07/08/2019 headache. Mild to moderate cerebral VSP.   07/09/2019 NAEO      Review of Symptoms: nausea associated with movement and meds.   Constitutional: Denies fevers, weight loss, chills, or weakness.   Eyes: Denies changes in vision.   ENT: Denies dysphagia, nasal discharge, ear pain or discharge.   Cardiovascular: Denies chest pain, palpitations, orthopnea, or claudication.   Respiratory: Denies shortness of breath, cough, hemoptysis, or wheezing.   GI: Denies nausea/vomitting, hematochezia, melena, abd pain, or changes in appetite.   : Denies dysuria, incontinence, or hematuria.   Musculoskeletal: Denies joint pain or myalgias.   Skin/breast: Denies rashes, lumps, lesions, or discharge.   Neurologic: Denies headache, dizziness, vertigo, or paresthesias.   Psychiatric: Denies changes in mood or hallucinations.    Endocrine: Denies polyuria, polydipsia, heat/cold intolerance.   Hematologic/Lymph: Denies lymphadenopathy, easy bruising or easy bleeding.   Allergic/Immunologic: Denies rash, rhinitis      Medications:  Continuous Infusions:   lactated ringers 100 mL/hr at 07/09/19 0800     Scheduled Meds:   enoxparin  40 mg Subcutaneous Q24H    fludrocortisone  100 mcg Oral BID    gabapentin  100 mg Oral TID    montelukast  10 mg Oral Daily    niMODipine  60 mg Oral Q4H    pantoprazole  40 mg Oral BID     PRN Meds:.acetaminophen, Dextrose 10% Bolus, Dextrose 10% Bolus, glucagon (human recombinant), glucose, glucose, insulin aspart U-100, magnesium oxide, magnesium oxide, ondansetron, potassium chloride 10%, potassium chloride 10%, potassium chloride 10%, potassium, sodium phosphates, potassium, sodium phosphates, potassium, sodium phosphates, ramelteon, simethicone, sodium chloride 0.9%    OBJECTIVE:   Vital Signs (Most Recent):   Temp: 98.1 °F (36.7 °C) (07/09/19 0700)  Pulse: 82 (07/09/19 0800)  Resp: (!) 21 (07/09/19 0800)  BP: (!) 179/78 (07/09/19 0800)  SpO2: 96 % (07/09/19 0800)    Vital Signs (24h Range):   Temp:  [98.1 °F (36.7 °C)-100.1 °F (37.8 °C)] 98.1 °F (36.7 °C)  Pulse:  [68-90] 82  Resp:  [10-33] 21  SpO2:  [90 %-98 %] 96 %  BP: (122-222)/(61-90) 179/78  Arterial Line BP: ()/(46-94) 99/94    ICP/CPP (Last 24h):        I & O (Last 24h):     Intake/Output Summary (Last 24 hours) at 7/9/2019 0835  Last data filed at 7/9/2019 0800  Gross per 24 hour   Intake 2490 ml   Output 2500 ml   Net -10 ml     Physical Exam: unchanged from previous day  GA: Alert, comfortable, no acute distress.   HEENT: No scleral icterus or JVD. Neck supple  Pulmonary: Air entry equal to auscultation A/P/L. Wheezing no, crackles no  Cardiac: RRR, S1 & S2 w/o rubs/murmurs/gallops.   Abdominal: soft, non-tender, bowel sounds present x 4. No appreciable hepatosplenomegaly.  Skin: No jaundice, rashes, or visible  lesions.  Neuro:  --GCS: 15  --Mental Status:   Awake and alert, aao x3. Fluent, follows commands.   --CN II-XII grossly intact.   --Pupils 3.5 mm, PERRL.   --Corneal reflex not done in this awake patient, gag, cough intact.  --LUE strength: 5/5  --RUE strength: 5/5  --LLE strength: 5/5  --RLE strength: 5/5  MSK:  No edema in UE and LE    Vent Data:        Lines/Drains/Airway:            Arterial Line 07/05/19 1100 Left Radial (Active)   Site Assessment Clean;Dry;Intact;No redness;No swelling 7/9/2019  3:00 AM   Line Status Pulsatile blood flow 7/9/2019  3:00 AM   Art Line Waveform Appropriate;Square wave test performed 7/9/2019  3:00 AM   Arterial Line Interventions Zeroed and calibrated;Leveled;Connections checked and tightened;Flushed per protocol 7/9/2019  3:00 AM   Color/Movement/Sensation Capillary refill less than 3 sec 7/9/2019  3:00 AM   Dressing Type Transparent 7/9/2019  3:00 AM   Dressing Status Clean;Dry;Intact;Biopatch in place 7/9/2019  3:00 AM   Dressing Intervention Dressing reinforced 7/9/2019  3:00 AM   Dressing Change Due 07/12/19 7/9/2019  3:00 AM      Female External Urinary Catheter 07/04/19 1806 (Active)   Skin no redness;no breakdown 7/9/2019  3:00 AM   Tolerance no signs/symptoms of discomfort 7/9/2019  3:00 AM   Suction Continuous suction at 50 mmHg 7/8/2019  7:01 PM   Date of last wick change 07/09/19 7/9/2019  5:00 AM   Time of last wick change 0500 7/9/2019  5:00 AM   Output (mL) 150 mL 7/9/2019  7:00 AM       Nutrition/Tube Feeds (if NPO state why): po    Labs:  ABG: No results for input(s): PH, PO2, PCO2, HCO3, POCSATURATED, BE in the last 24 hours.  BMP:  Recent Labs   Lab 07/09/19  0245      K 3.8      CO2 24   BUN 11   CREATININE 0.7      MG 1.9   PHOS 4.4     LFT:   Lab Results   Component Value Date    AST 43 (H) 07/09/2019    ALT 46 (H) 07/09/2019    ALKPHOS 110 07/09/2019    BILITOT 0.5 07/09/2019    ALBUMIN 2.8 (L) 07/09/2019    PROT 5.8 (L) 07/09/2019      CBC:   Lab Results   Component Value Date    WBC 10.11 07/09/2019    HGB 10.6 (L) 07/09/2019    HCT 34.5 (L) 07/09/2019    MCV 90 07/09/2019     07/09/2019     Microbiology x 7d:   Microbiology Results (last 7 days)     ** No results found for the last 168 hours. **        Imaging:    I personally reviewed the above image.  Today I independently reviewed pertinent medications, lines/drains/airways, imaging, cardiology results, laboratory results, microbiology results, notably:   ASSESSMENT/PLAN:     Active Hospital Problems    Diagnosis    *Nontraumatic subarachnoid hemorrhage from anterior communicating artery    Cerebral vasospasm    Aneurysm of anterior communicating artery    Mild intermittent asthma with status asthmaticus      Neuro:   SAH day 5  Post coil day 4  Nimodipine/ daily TCD  Maintain euvolemia   q1hr neurochecks.   Continue neurontin 100mg q8hrly - improved headache  PT/OT  oob to chair, mobilize as tolerated.     Pulmonary:   Saturations > 92% on room air    Cardiac:   Permissive hypertension upto   EF 63% 7/7    Renal:    Monitor urine output and sodium for signs of CSW  Continue florinef 0.1mg [id number] to control urine output    ID:   Afebrile, normal wbc    Hem/Onc:   Stable hh and plt count    Endocrine:    BS stable    Fluids/Electrolytes/Nutrition/GI:   po  Replete lytes as needed  zofran for nausea.   Lines:  Art + d/c today  CVC NA  ETT NA  Man NA  NG NA  PEG NA    Proph:  DVT:SCD/lovenox  Constipation:  Last output:bowel regimen as needed  PUP:NA  VAP:NA      Uninterrupted Critical Care/Counseling Time (not including procedures):: III    Don Alvarez MD  Neurocritical care attending    Full Code    Don Alvarez MD  Neurocritical Care  [facility]

## 2019-07-09 NOTE — PROGRESS NOTES
[facility]  Vascular Neurology  Comprehensive Stroke Center  Progress Note    Assessment/Plan:     * Nontraumatic subarachnoid hemorrhage from anterior communicating artery    63 yo with hx of breast cancer presents to the ED with complaints of intractable headache after a barium CT abdomen on 7/2. CT head on 7/4 with diffuse SAH within basal cisterns and midline- NSY was consulted. CTA on 7/4 revealed small anterior communicating artery aneurysm-. She was taken to IR on 7/5 and is now s/p coiling. Patient is admitted to [facility] for close monitoring.     TCD 7/9: Improving left CAROLINA vasospasm  Neuro exam stable        Vasospasm prophylaxis:  Nimodipine 60 mg q4hrs, statin 40 mg daily   - Monitor hemodynamics, prn IVF to sustain euvolemia and watch for electrolyte abnormalities  Antithrombotics: none (SAH)  Statins: Atorvastatin 40 mg daily  Aggressive risk factor modification:   Rehab efforts: PT/OT/SLP to eval and treat.   Diagnostics ordered/pending: daily TCDs  VTE prophylaxis: lovenox  BP parameters: secured aneurysm- keep SBP <200    Aneurysm of anterior communicating artery  S/p coiling- done on 7/4 7/6/19: NAEON. Patient now s/p coiling. Patient reporting improvement in headaches.   7/719: Neuro exam stable. Patient c/o difficulty sleeping and headache overnight. TCDs show increased velocities. Patient to continue PRN tylenol for HA and start ramelteon for sleep tonight.   07/08/2019 neuro exam states stable, complains of some non-constant headache worsening with moving. Some pain with lateral gaze.       STROKE DOCUMENTATION   Acute Stroke Times   Last Known Normal Date: 07/03/19  Last Known Normal Time: (unable to determine)  Symptom Onset Date: 07/03/19  Symptom Onset Time: (unclear)  Stroke Team Called Date: 07/05/19  Stroke Team Called Time: 0820  Stroke Team Arrival Date: 07/05/19  Stroke Team Arrival Time: 0825  CT Interpretation Time: 0825  Decision to Treat Time for Alteplase:  (No iv alteplase candidate)  Decision to Treat Time for IR: 0825    NIH Scale:  1a. Level of Consciousness: 0-->Alert, keenly responsive  1b. LOC Questions: 0-->Answers both questions correctly  1c. LOC Commands: 0-->Performs both tasks correctly  2. Best Gaze: 0-->Normal  3. Visual: 0-->No visual loss  4. Facial Palsy: 0-->Normal symmetrical movements  5a. Motor Arm, Left: 0-->No drift, limb holds 90 (or 45) degrees for full 10 secs  5b. Motor Arm, Right: 0-->No drift, limb holds 90 (or 45) degrees for full 10 secs  6a. Motor Leg, Left: 0-->No drift, leg holds 30 degree position for full 5 secs  6b. Motor Leg, Right: 0-->No drift, leg holds 30 degree position for full 5 secs  7. Limb Ataxia: 0-->Absent  8. Sensory: 0-->Normal, no sensory loss  9. Best Language: 0-->No aphasia, normal  10. Dysarthria: 0-->Normal  11. Extinction and Inattention (formerly Neglect): 0-->No abnormality  Total (NIH Stroke Scale): 0       Modified [place] Score: 0  Dino Coma Scale:    ABCD2 Score:    [id number] Score:   HAS -BLED Score:   ICH Score:0  Hunt & Rucker Classification:Grade I     Hemorrhagic change of an Ischemic Stroke: Does this patient have an ischemic stroke with hemorrhagic changes? No     Neurologic Chief Complaint: Headache, SAH    Subjective:     Interval History: Patient is seen for follow-up neurological assessment and treatment recommendations: neurological exam stays stable. Neuro exam stays stable.TCD daily.    HPI, Past Medical, Family, and Social History remains the same as documented in the initial encounter.     Review of Systems   Constitutional: Negative for fever.   HENT: Negative for trouble swallowing and voice change.    Eyes: Negative for visual disturbance.   Respiratory: Negative for shortness of breath.    Cardiovascular: Negative for palpitations.   Gastrointestinal: Positive for nausea.   Musculoskeletal: Negative for neck pain and neck stiffness.   Neurological: Positive for headaches. Negative  for dizziness, tremors, seizures, facial asymmetry, speech difficulty, weakness and numbness.   Psychiatric/Behavioral: Negative for confusion.     Scheduled Meds:   enoxparin  40 mg Subcutaneous Q24H    fludrocortisone  100 mcg Oral BID    gabapentin  100 mg Oral TID    montelukast  10 mg Oral Daily    niMODipine  60 mg Oral Q4H    pantoprazole  40 mg Oral BID     Continuous Infusions:   lactated ringers 100 mL/hr at 07/09/19 1200     PRN Meds:acetaminophen, Dextrose 10% Bolus, Dextrose 10% Bolus, glucagon (human recombinant), glucose, glucose, insulin aspart U-100, magnesium oxide, magnesium oxide, ondansetron, potassium chloride 10%, potassium chloride 10%, potassium chloride 10%, potassium, sodium phosphates, potassium, sodium phosphates, potassium, sodium phosphates, ramelteon, simethicone, sodium chloride 0.9%    Objective:     Vital Signs (Most Recent):  Temp: 98.7 °F (37.1 °C) (07/09/19 1100)  Pulse: 80 (07/09/19 1200)  Resp: 18 (07/09/19 1200)  BP: (!) 183/79 (07/09/19 1200)  SpO2: (!) 93 % (07/09/19 1200)  BP Location: Right arm    Vital Signs Range (Last 24H):  Temp:  [98.1 °F (36.7 °C)-100.1 °F (37.8 °C)]   Pulse:  [68-87]   Resp:  [10-33]   BP: (152-222)/(67-91)   SpO2:  [90 %-98 %]   Arterial Line BP: ()/()   BP Location: Right arm    Physical Exam   Neck: Normal range of motion. Neck supple.   Cardiovascular: Normal rate.   Pulmonary/Chest: Effort normal.   Musculoskeletal: Normal range of motion.   Skin: Capillary refill takes less than 2 seconds.   Nursing note and vitals reviewed.      Neurological Exam:   LOC: alert  Attention Span: Good   Language: No aphasia  Articulation: No dysarthria  Orientation: Person, Place, Time   Visual Fields: Full  EOM (CN III, IV, VI): Full/intact  Pupils (CN II, III): PERRL  Facial Sensation (CN V): Normal  Facial Movement (CN VII): Symmetric facial expression    Motor: Arm left  Normal 5/5  Leg left  Normal 5/5  Arm right  Normal 5/5  Leg right  Normal 5/5  Cebellar: No evidence of appendicular or axial ataxia  Sensation: Intact to light touch, temperature and vibration      Diagnostic Results     Brain Imaging      CT head w/o contrast 07/04/2019  1. Subarachnoid hemorrhage.  In the setting of no trauma suspect aneurysm     Vessel Imaging      IR angio 07/05/2019  1. 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    2.  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    3.  Distal embolism likely from the access catheter into the distal branches of the anterior middle cerebral artery on the left side treated successfully with intra-arterial Integrilin     CTA head 07/04/2019  Small anterior communicating artery aneurysm likely the origin of the recent subarachnoid bleed.    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 The estimated ejection fraction is 63%  · Concentric left ventricular remodeling.  · No wall motion abnormalities.  · Normal LV diastolic function.  · Normal right ventricular systolic function.  · Mild tricuspid regurgitation.  · Normal central venous pressure (3 mm Hg).  · The estimated PA systolic pressure is 22 mm Hg         Gianna Rice MD  Comprehensive Stroke Center  Department of Vascular Neurology   [facility]

## 2019-07-09 NOTE — PT/OT/SLP PROGRESS
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 female admitted with a medical diagnosis of Nontraumatic subarachnoid hemorrhage from anterior communicating artery.  She continued to complain of a headache today, but had less nausea and less instability.  She is limited by headache.  She ambulated 8 feet x 2 trials with hand held assistance (including turning) with no episodes of LOB today.  PT will continue to closely assess for changes in her presentation and discharge needs. .      Problem List:  gait instability, impaired balance, impaired self care skills, impaired functional mobilty, pain  Rehab Prognosis:  good; patient would benefit from acute skilled PT services to address these deficits and reach maximum level of function.      Subjective   PT communicated with RN prior to therapy.     Patient comments/goals: I have never experienced pain like this. I get migraines. I would wish for a migraine over this pain.   Pain/Comfort:  · Pain Rating 1: (hedache)    Recent Vital Signs: (Last documentation)  Pulse: 80 (07/09/19 1200)  BP: (!) 183/79 (07/09/19 1200)  SpO2: (!) 93 % (07/09/19 1200)     Objective:   General Precautions: aspiration, fall  Recent Surgery: Procedure(s) (LRB):  ANGIOGRAM-CEREBRAL, acomm aneurysm (N/A)    The patient currently has blood pressure cuff, pulse ox (continuous), telemetry, PureWick, peripheral IV.    The patient was found supine in ICU with wet cloth over her face.  She was visibly in pain from her headache, but agreed to attempt therapy.  Patient performed bed mobility with set up assistance. Sitting balance independent with no LOB and no nausea.  Sit to stand 2x with CGA/hand held assistance and no  LOB in initial sitting.  Gait 2 x 8 feet within room with hand held assistance.  She required a prolonged sitting rest break between trials d/t headache. She demonstrated very slow gait pattern without LOB, instability, nausea or safety concerns.  Gait deviations appear to be due to c/o headache pain.  Sit to supine SBA.  Pt was left supine in bed in NAD with Rn present.      Therapeutic Activities, Education, or Exercises:  Therapist educated the patient on the role of PT, POC, progress with mobility, goals, discharge planning, and level of assistance with transfers and Importance of progressive mobilization, out of bed positioning, and participation in therapy.  The therapist discussed the patients current mobility status, deficits, and level of assistance with RN.  Time was also provided for active listening,  therapeutic counseling and discussion of health disposition. The therapist answered questions to patient/familys satisfaction within scope of practice.   White board updated to reflect current level of assistance.     FUNCTIONAL OUTCOME MEASURES:  AMPAC:  Turning over in bed (including adjusting bedclothes, sheets and blankets)?: 4  Sitting down on and standing up from a chair with arms (e.g., wheelchair, bedside commode, etc.): 3  Moving from lying on back to sitting on the side of the bed?: 4  Moving to and from a bed to a chair (including a wheelchair)?: 3  Need to walk in hospital room?: 3  Climbing 3-5 steps with a railing?: 3  Basic Mobility Total Score: 20    Goals:     Multidisciplinary Problems     Physical Therapy Goals        Problem: Physical Therapy Goal    Goal Priority Disciplines Outcome Goal Variances Interventions   Physical Therapy Goal     PT, PT/OT Ongoing (interventions implemented as appropriate)     Description:    Goals to be met by 7/18/2019    1. Pt will perform rolling to the R and L with SBA. - MET  2. Pt will perform supine to sit from both sides of the bed with SBA. -  MET  3. Pt will perform sit to supine with SBA. - MET  4. Pt will perform sit to stand transfers with SBA.    5. Pt will perform bed <> chair transfers with SBA.  6. Pt will perform gait x 150 feet with SBA with no AD                       Plan:   During this hospitalization, patient to be seen 4 x/week to address their physical therapy related impairments and improve their overall level of function.   · Plan of Care Expires: 08/06/19   Plan of Care Reviewed with: patient    This plan of care has been discussed with the patient and/or family who were involved in its development and are in agreement with the identified goals and treatment plan.     Time Tracking:     PT Received On:  07/09/19  PT Start Time:   1305    PT Stop Time:  1332  PT Total Time (min): 27 min     Billable Minutes: Gait Training 10 and Therapeutic Activity 17     Cristy Craven, PT  7/9/2019  [phone] (pager)

## 2019-07-09 NOTE — PLAN OF CARE
Problem: Adult Inpatient Plan of Care  Goal: Plan of Care Review  Outcome: Ongoing (interventions implemented as appropriate)  POC reviewed with pt at 0500. Pt verbalized understanding. Headache decreased overnight. Pt had T-max of 100.1, lowered with tylenol. Questions and concerns addressed. Midline consult in d/t poor venous access and limb restriction. No acute events overnight. Pt progressing toward goals. Will continue to monitor. See flowsheets for full assessment and VS info

## 2019-07-09 NOTE — SUBJECTIVE & OBJECTIVE
Neurologic Chief Complaint: Headache, SAH    Subjective:     Interval History: Patient is seen for follow-up neurological assessment and treatment recommendations: neurological exam stays stable. Neuro exam stays stable.TCD daily.    HPI, Past Medical, Family, and Social History remains the same as documented in the initial encounter.     Review of Systems   Constitutional: Negative for fever.   HENT: Negative for trouble swallowing and voice change.    Eyes: Negative for visual disturbance.   Respiratory: Negative for shortness of breath.    Cardiovascular: Negative for palpitations.   Gastrointestinal: Positive for nausea.   Musculoskeletal: Negative for neck pain and neck stiffness.   Neurological: Positive for headaches. Negative for dizziness, tremors, seizures, facial asymmetry, speech difficulty, weakness and numbness.   Psychiatric/Behavioral: Negative for confusion.     Scheduled Meds:   enoxparin  40 mg Subcutaneous Q24H    fludrocortisone  100 mcg Oral BID    gabapentin  100 mg Oral TID    montelukast  10 mg Oral Daily    niMODipine  60 mg Oral Q4H    pantoprazole  40 mg Oral BID     Continuous Infusions:   lactated ringers 100 mL/hr at 07/09/19 1200     PRN Meds:acetaminophen, Dextrose 10% Bolus, Dextrose 10% Bolus, glucagon (human recombinant), glucose, glucose, insulin aspart U-100, magnesium oxide, magnesium oxide, ondansetron, potassium chloride 10%, potassium chloride 10%, potassium chloride 10%, potassium, sodium phosphates, potassium, sodium phosphates, potassium, sodium phosphates, ramelteon, simethicone, sodium chloride 0.9%    Objective:     Vital Signs (Most Recent):  Temp: 98.7 °F (37.1 °C) (07/09/19 1100)  Pulse: 80 (07/09/19 1200)  Resp: 18 (07/09/19 1200)  BP: (!) 183/79 (07/09/19 1200)  SpO2: (!) 93 % (07/09/19 1200)  BP Location: Right arm    Vital Signs Range (Last 24H):  Temp:  [98.1 °F (36.7 °C)-100.1 °F (37.8 °C)]   Pulse:  [68-87]   Resp:  [10-33]   BP: (152-222)/(67-91)    SpO2:  [90 %-98 %]   Arterial Line BP: ()/()   BP Location: Right arm    Physical Exam   Neck: Normal range of motion. Neck supple.   Cardiovascular: Normal rate.   Pulmonary/Chest: Effort normal.   Musculoskeletal: Normal range of motion.   Skin: Capillary refill takes less than 2 seconds.   Nursing note and vitals reviewed.      Neurological Exam:   LOC: alert  Attention Span: Good   Language: No aphasia  Articulation: No dysarthria  Orientation: Person, Place, Time   Visual Fields: Full  EOM (CN III, IV, VI): Full/intact  Pupils (CN II, III): PERRL  Facial Sensation (CN V): Normal  Facial Movement (CN VII): Symmetric facial expression    Motor: Arm left  Normal 5/5  Leg left  Normal 5/5  Arm right  Normal 5/5  Leg right Normal 5/5  Cebellar: No evidence of appendicular or axial ataxia  Sensation: Intact to light touch, temperature and vibration      Diagnostic Results     Brain Imaging      CT head w/o contrast 07/04/2019  1. Subarachnoid hemorrhage.  In the setting of no trauma suspect aneurysm     Vessel Imaging      IR angio 07/05/2019  1. 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    2.  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    3.  Distal embolism likely from the access catheter into the distal branches of the anterior middle cerebral artery on the left side treated successfully with intra-arterial Integrilin     CTA head 07/04/2019  Small anterior communicating artery aneurysm likely the origin of the recent subarachnoid bleed.    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 The estimated ejection fraction is 63%  · Concentric left ventricular remodeling.  · No wall motion abnormalities.  · Normal LV diastolic function.  · Normal right ventricular systolic function.  · Mild tricuspid  regurgitation.  · Normal central venous pressure (3 mm Hg).  · The estimated PA systolic pressure is 22 mm Hg

## 2019-07-09 NOTE — PLAN OF CARE
Problem: Adult Inpatient Plan of Care  Goal: Plan of Care Review  Outcome: Ongoing (interventions implemented as appropriate)  POC reviewed with pt and family at 1600. Pt and  verbalized understanding. Questions and concerns addressed. No acute events today. Pt progressing toward goals. Arterial line removed. Pt up in chair this morning. Ambulated with PT. Increased headache, nausea throughout afternoon. Regular diet tolerated moderately well. Will continue to monitor. See flowsheets for full assessment and VS info.

## 2019-07-09 NOTE — ANESTHESIA POSTPROCEDURE EVALUATION
Anesthesia Post Evaluation    Patient: Gissel Jamison    Procedure(s) Performed: Procedure(s) (LRB):  ANGIOGRAM-CEREBRAL, acomm aneurysm (N/A)    Final Anesthesia Type: general  Patient location during evaluation: PACU  Patient participation: Yes- Able to Participate  Level of consciousness: awake and alert and oriented  Post-procedure vital signs: reviewed and stable  Pain management: adequate  Airway patency: patent  PONV status at discharge: No PONV  Anesthetic complications: no      Cardiovascular status: blood pressure returned to baseline and hemodynamically stable  Respiratory status: unassisted, room air and spontaneous ventilation  Hydration status: euvolemic  Follow-up not needed.    **Note entered after PACU stay, but patient seen and evaluated in PACU.  The above reflects the patient's condition when seen.        No case tracking events are documented in the log.      Pain/Lala Score: Pain Rating Prior to Med Admin: 4 (7/8/2019 11:30 PM)  Pain Rating Post Med Admin: 4 (7/9/2019 12:30 AM)

## 2019-07-10 PROBLEM — G44.89 OTHER HEADACHE SYNDROME: Status: ACTIVE | Noted: 2019-07-10

## 2019-07-10 LAB
ALBUMIN SERPL BCP-MCNC: 2.8 G/DL (ref 3.5–5.2)
ALP SERPL-CCNC: 123 U/L (ref 55–135)
ALT SERPL W/O P-5'-P-CCNC: 47 U/L (ref 10–44)
ANION GAP SERPL CALC-SCNC: 8 MMOL/L (ref 8–16)
AST SERPL-CCNC: 39 U/L (ref 10–40)
BASOPHILS # BLD AUTO: 0.05 K/UL (ref 0–0.2)
BASOPHILS NFR BLD: 0.5 % (ref 0–1.9)
BILIRUB SERPL-MCNC: 0.4 MG/DL (ref 0.1–1)
BUN SERPL-MCNC: 11 MG/DL (ref 8–23)
CALCIUM SERPL-MCNC: 9.3 MG/DL (ref 8.7–10.5)
CHLORIDE SERPL-SCNC: 102 MMOL/L (ref 95–110)
CO2 SERPL-SCNC: 25 MMOL/L (ref 23–29)
CREAT SERPL-MCNC: 0.6 MG/DL (ref 0.5–1.4)
DIFFERENTIAL METHOD: ABNORMAL
EOSINOPHIL # BLD AUTO: 0.2 K/UL (ref 0–0.5)
EOSINOPHIL NFR BLD: 1.7 % (ref 0–8)
ERYTHROCYTE [DISTWIDTH] IN BLOOD BY AUTOMATED COUNT: 13.4 % (ref 11.5–14.5)
EST. GFR  (AFRICAN AMERICAN): >60 ML/MIN/1.73 M^2
EST. GFR  (NON AFRICAN AMERICAN): >60 ML/MIN/1.73 M^2
GLUCOSE SERPL-MCNC: 105 MG/DL (ref 70–110)
HCT VFR BLD AUTO: 35.4 % (ref 37–48.5)
HGB BLD-MCNC: 11 G/DL (ref 12–16)
IMM GRANULOCYTES # BLD AUTO: 0.12 K/UL (ref 0–0.04)
IMM GRANULOCYTES NFR BLD AUTO: 1.1 % (ref 0–0.5)
LYMPHOCYTES # BLD AUTO: 2.4 K/UL (ref 1–4.8)
LYMPHOCYTES NFR BLD: 22.5 % (ref 18–48)
MAGNESIUM SERPL-MCNC: 2 MG/DL (ref 1.6–2.6)
MCH RBC QN AUTO: 27 PG (ref 27–31)
MCHC RBC AUTO-ENTMCNC: 31.1 G/DL (ref 32–36)
MCV RBC AUTO: 87 FL (ref 82–98)
MONOCYTES # BLD AUTO: 1 K/UL (ref 0.3–1)
MONOCYTES NFR BLD: 9.3 % (ref 4–15)
NEUTROPHILS # BLD AUTO: 6.9 K/UL (ref 1.8–7.7)
NEUTROPHILS NFR BLD: 64.9 % (ref 38–73)
NRBC BLD-RTO: 0 /100 WBC
PHOSPHATE SERPL-MCNC: 4.1 MG/DL (ref 2.7–4.5)
PLATELET # BLD AUTO: 260 K/UL (ref 150–350)
PMV BLD AUTO: 9.3 FL (ref 9.2–12.9)
POCT GLUCOSE: 109 MG/DL (ref 70–110)
POCT GLUCOSE: 114 MG/DL (ref 70–110)
POTASSIUM SERPL-SCNC: 4 MMOL/L (ref 3.5–5.1)
PROT SERPL-MCNC: 6 G/DL (ref 6–8.4)
RBC # BLD AUTO: 4.07 M/UL (ref 4–5.4)
SODIUM SERPL-SCNC: 135 MMOL/L (ref 136–145)
WBC # BLD AUTO: 10.7 K/UL (ref 3.9–12.7)

## 2019-07-10 PROCEDURE — 25000003 PHARM REV CODE 250: Performed by: UROLOGY

## 2019-07-10 PROCEDURE — 99233 SBSQ HOSP IP/OBS HIGH 50: CPT | Mod: ,,, | Performed by: PSYCHIATRY & NEUROLOGY

## 2019-07-10 PROCEDURE — 20000000 HC ICU ROOM

## 2019-07-10 PROCEDURE — 99233 PR SUBSEQUENT HOSPITAL CARE,LEVL III: ICD-10-PCS | Mod: ,,, | Performed by: PSYCHIATRY & NEUROLOGY

## 2019-07-10 PROCEDURE — 25000003 PHARM REV CODE 250: Performed by: PSYCHIATRY & NEUROLOGY

## 2019-07-10 PROCEDURE — 63600175 PHARM REV CODE 636 W HCPCS: Performed by: NURSE PRACTITIONER

## 2019-07-10 PROCEDURE — 80053 COMPREHEN METABOLIC PANEL: CPT

## 2019-07-10 PROCEDURE — 84100 ASSAY OF PHOSPHORUS: CPT

## 2019-07-10 PROCEDURE — 83735 ASSAY OF MAGNESIUM: CPT

## 2019-07-10 PROCEDURE — 63600175 PHARM REV CODE 636 W HCPCS: Performed by: STUDENT IN AN ORGANIZED HEALTH CARE EDUCATION/TRAINING PROGRAM

## 2019-07-10 PROCEDURE — 94761 N-INVAS EAR/PLS OXIMETRY MLT: CPT

## 2019-07-10 PROCEDURE — 85025 COMPLETE CBC W/AUTO DIFF WBC: CPT

## 2019-07-10 RX ORDER — METHYLPREDNISOLONE SOD SUCC 125 MG
125 VIAL (EA) INJECTION ONCE
Status: COMPLETED | OUTPATIENT
Start: 2019-07-10 | End: 2019-07-10

## 2019-07-10 RX ADMIN — MONTELUKAST SODIUM 10 MG: 10 TABLET, COATED ORAL at 08:07

## 2019-07-10 RX ADMIN — NIMODIPINE 60 MG: 30 CAPSULE, LIQUID FILLED ORAL at 05:07

## 2019-07-10 RX ADMIN — PANTOPRAZOLE SODIUM 40 MG: 40 TABLET, DELAYED RELEASE ORAL at 09:07

## 2019-07-10 RX ADMIN — GABAPENTIN 100 MG: 100 CAPSULE ORAL at 02:07

## 2019-07-10 RX ADMIN — FLUDROCORTISONE ACETATE 100 MCG: 0.1 TABLET ORAL at 08:07

## 2019-07-10 RX ADMIN — NIMODIPINE 60 MG: 30 CAPSULE, LIQUID FILLED ORAL at 09:07

## 2019-07-10 RX ADMIN — NIMODIPINE 60 MG: 30 CAPSULE, LIQUID FILLED ORAL at 10:07

## 2019-07-10 RX ADMIN — PANTOPRAZOLE SODIUM 40 MG: 40 TABLET, DELAYED RELEASE ORAL at 08:07

## 2019-07-10 RX ADMIN — GABAPENTIN 100 MG: 100 CAPSULE ORAL at 08:07

## 2019-07-10 RX ADMIN — NIMODIPINE 60 MG: 30 CAPSULE, LIQUID FILLED ORAL at 02:07

## 2019-07-10 RX ADMIN — ENOXAPARIN SODIUM 40 MG: 100 INJECTION SUBCUTANEOUS at 05:07

## 2019-07-10 RX ADMIN — GABAPENTIN 100 MG: 100 CAPSULE ORAL at 09:07

## 2019-07-10 RX ADMIN — METHYLPREDNISOLONE SODIUM SUCCINATE 125 MG: 125 INJECTION, POWDER, FOR SOLUTION INTRAMUSCULAR; INTRAVENOUS at 10:07

## 2019-07-10 NOTE — PROGRESS NOTES
[facility]  Neurosurgery  Progress Note    Subjective:     History of Present Illness: 63 yo with hx of breast cancer presents to the ED with complaints of headache after a barium CT abdomen on Tuesday. Reports intractable headache which began 2 days ago. Associated n/v, neck pain. Denies visual changes, weakness, paresthesias, b/b dysfunction. No trauma, falls, or LOC. She does not take antiplatelet therapy or anticoagulants. Currently on a cardene drip started at the outside hospital. She states she is tired from the morphine given prior to transport. Family at bedside.       Post-Op Info:  Procedure(s) (LRB):  ANGIOGRAM-CEREBRAL, acomm aneurysm (N/A)   5 Days Post-Op     Interval History: PBD7. HA improved. TCDs yesterday WNL.     Medications:  Continuous Infusions:   lactated ringers 100 mL/hr at 07/10/19 1000     Scheduled Meds:   enoxparin  40 mg Subcutaneous Q24H    gabapentin  100 mg Oral TID    montelukast  10 mg Oral Daily    niMODipine  60 mg Oral Q4H    pantoprazole  40 mg Oral BID     PRN Meds:acetaminophen, magnesium oxide, magnesium oxide, ondansetron, potassium chloride 10%, potassium chloride 10%, potassium chloride 10%, potassium, sodium phosphates, potassium, sodium phosphates, potassium, sodium phosphates, ramelteon, simethicone, sodium chloride 0.9%     Review of Systems  Objective:     Weight: 69.9 kg (154 lb)  Body mass index is 25.63 kg/m².  Vital Signs (Most Recent):  Temp: 99 °F (37.2 °C) (07/10/19 0300)  Pulse: 83 (07/10/19 1000)  Resp: 20 (07/10/19 1000)  BP: (!) 165/81 (07/10/19 1000)  SpO2: (!) 93 % (07/10/19 1000) Vital Signs (24h Range):  Temp:  [98.7 °F (37.1 °C)-100 °F (37.8 °C)] 99 °F (37.2 °C)  Pulse:  [73-98] 83  Resp:  [12-37] 20  SpO2:  [92 %-98 %] 93 %  BP: (150-203)/(67-93) 165/81     Date 07/10/19 0700 - 07/11/19 0659   Shift 9920-6464 8005-7193 3830-3410 24 Hour Total   INTAKE   P.O. 240   240   I.V.(mL/kg) 400(5.7)   400(5.7)   Shift Total(mL/kg)  640(9.2)   640(9.2)   OUTPUT   Urine(mL/kg/hr) 300   300   Shift Total(mL/kg) 300(4.3)   300(4.3)   Weight (kg) 69.9 69.9 69.9 69.9                   Female External Urinary Catheter 07/04/19 1806 (Active)   Skin perineum cleansed w/ soap and water 7/10/2019  7:00 AM   Tolerance no signs/symptoms of discomfort 7/10/2019  7:00 AM   Suction Continuous suction at 50 mmHg 7/10/2019  7:00 AM   Date of last wick change 07/09/19 7/10/2019  7:00 AM   Time of last wick change 1500 7/10/2019  7:00 AM   Output (mL) 300 mL 7/10/2019 10:00 AM       Neurosurgery Physical Exam   AOX3, speech fluent  PERRL, face symm  CAMPBELL symm  SILT  No drift    Significant Labs:  Recent Labs   Lab 07/09/19  0245 07/09/19  0900 07/10/19  0210     --  105     --  135*   K 3.8 4.1 4.0     --  102   CO2 24  --  25   BUN 11  --  11   CREATININE 0.7  --  0.6   CALCIUM 8.8  --  9.3   MG 1.9  --  2.0     Recent Labs   Lab 07/09/19 0245 07/10/19  0210   WBC 10.11 10.70   HGB 10.6* 11.0*   HCT 34.5* 35.4*    260         Significant Diagnostics:  None    Assessment/Plan:     Aneurysm of anterior communicating artery  64 F who presented with severe HA, neck pain, vomiting and found to have HH2 F2 SAH secondary to ruptured ACOM aneurysm now s/p coiling 7/5:    PBD 7 PCD5    Neuro:  --Patient admitted to Neuro-ICU; preponderance of medical care per NCC      -Q1h neurochecks while in ICU  --HOB@30  --Keppra 500 BID x7d  --TCDs daily x14d  --Nimotop 60q4 x 21d  --Continue to monitor   --Continue to follow clinically and notify NSGY immediately if any neurostatus change    CVS:  ---220 mmHg (cardene ggt; hydralazine & labetalol PRN; transition to home meds when appropriate per NCC)  --Echo normal    Pulm:  --Recc CXR for baseline resp status  --Aggressive pulm treatment per NCC    GIT/Electrolytes:  --CMP daily      -Replete electrolytes PRN per NCC  --Na goal >135  --Recc Utox/UA    Renal:  --Strict I/Os  --Goal of euvolemia or  net +1L - euvolemic today    Heme/ID:  --Daily CBC      -Transfuse PRN  --Trend WBC and T    PPx:  --TEDs/SCDs  --SQH appropriate once aneurysm secured  --PPI    Dispo: Continued ICU care at this time, PT/OT when appropriate per NCC, will consider stepping down to floor later this week.        Lizbeth Packer MD  Neurosurgery  [facility]

## 2019-07-10 NOTE — PLAN OF CARE
Problem: Adult Inpatient Plan of Care  Goal: Plan of Care Review  Outcome: Ongoing (interventions implemented as appropriate)  POC reviewed with pt and family at 1600. Pt and spouse verbalized understanding. Questions and concerns addressed. No acute events today. Pt progressing toward goals. Pt to chair, couch, bathroom throughout shift. Appetite improving. Nausea resolved. Mild headache. Will continue to monitor. See flowsheets for full assessment and VS info.

## 2019-07-10 NOTE — SUBJECTIVE & OBJECTIVE
Interval History: PBD7. HA improved. TCDs yesterday WNL.     Medications:  Continuous Infusions:   lactated ringers 100 mL/hr at 07/10/19 1000     Scheduled Meds:   enoxparin  40 mg Subcutaneous Q24H    gabapentin  100 mg Oral TID    montelukast  10 mg Oral Daily    niMODipine  60 mg Oral Q4H    pantoprazole  40 mg Oral BID     PRN Meds:acetaminophen, magnesium oxide, magnesium oxide, ondansetron, potassium chloride 10%, potassium chloride 10%, potassium chloride 10%, potassium, sodium phosphates, potassium, sodium phosphates, potassium, sodium phosphates, ramelteon, simethicone, sodium chloride 0.9%     Review of Systems  Objective:     Weight: 69.9 kg (154 lb)  Body mass index is 25.63 kg/m².  Vital Signs (Most Recent):  Temp: 99 °F (37.2 °C) (07/10/19 0300)  Pulse: 83 (07/10/19 1000)  Resp: 20 (07/10/19 1000)  BP: (!) 165/81 (07/10/19 1000)  SpO2: (!) 93 % (07/10/19 1000) Vital Signs (24h Range):  Temp:  [98.7 °F (37.1 °C)-100 °F (37.8 °C)] 99 °F (37.2 °C)  Pulse:  [73-98] 83  Resp:  [12-37] 20  SpO2:  [92 %-98 %] 93 %  BP: (150-203)/(67-93) 165/81     Date 07/10/19 0700 - 07/11/19 0659   Shift 6747-6938 6421-3316 4991-5455 24 Hour Total   INTAKE   P.O. 240   240   I.V.(mL/kg) 400(5.7)   400(5.7)   Shift Total(mL/kg) 640(9.2)   640(9.2)   OUTPUT   Urine(mL/kg/hr) 300   300   Shift Total(mL/kg) 300(4.3)   300(4.3)   Weight (kg) 69.9 69.9 69.9 69.9                   Female External Urinary Catheter 07/04/19 1806 (Active)   Skin perineum cleansed w/ soap and water 7/10/2019  7:00 AM   Tolerance no signs/symptoms of discomfort 7/10/2019  7:00 AM   Suction Continuous suction at 50 mmHg 7/10/2019  7:00 AM   Date of last wick change 07/09/19 7/10/2019  7:00 AM   Time of last wick change 1500 7/10/2019  7:00 AM   Output (mL) 300 mL 7/10/2019 10:00 AM       Neurosurgery Physical Exam   AOX3, speech fluent  PERRL, face symm  CAMPBELL symm  SILT  No drift    Significant Labs:  Recent Labs   Lab 07/09/19  4528  07/09/19  0900 07/10/19  0210     --  105     --  135*   K 3.8 4.1 4.0     --  102   CO2 24  --  25   BUN 11  --  11   CREATININE 0.7  --  0.6   CALCIUM 8.8  --  9.3   MG 1.9  --  2.0     Recent Labs   Lab 07/09/19  0245 07/10/19  0210   WBC 10.11 10.70   HGB 10.6* 11.0*   HCT 34.5* 35.4*    260         Significant Diagnostics:  None

## 2019-07-10 NOTE — PLAN OF CARE
Problem: Adult Inpatient Plan of Care  Goal: Plan of Care Review  Outcome: Ongoing (interventions implemented as appropriate)  POC reviewed with pt at 0300. Pt and  verbalized understanding. Questions and concerns addressed. No acute events overnight. Low grade fever overnight. Will continue to monitor. See flowsheets for full assessment and VS info

## 2019-07-10 NOTE — SUBJECTIVE & OBJECTIVE
Neurologic Chief Complaint: Headache, SAH    Subjective:     Interval History: Patient is seen for follow-up neurological assessment and treatment recommendations: neurological exam stays stable. Vasospasm on TCD improved.    HPI, Past Medical, Family, and Social History remains the same as documented in the initial encounter.     Review of Systems   Constitutional: Negative for fever.   HENT: Negative for trouble swallowing and voice change.    Eyes: Negative for visual disturbance.   Respiratory: Negative for shortness of breath.    Cardiovascular: Negative for palpitations.   Gastrointestinal: Positive for nausea.   Musculoskeletal: Negative for neck pain and neck stiffness.   Neurological: Negative for dizziness, tremors, seizures, facial asymmetry, speech difficulty, weakness, numbness and headaches.   Psychiatric/Behavioral: Negative for confusion.     Scheduled Meds:   enoxparin  40 mg Subcutaneous Q24H    gabapentin  100 mg Oral TID    montelukast  10 mg Oral Daily    niMODipine  60 mg Oral Q4H    pantoprazole  40 mg Oral BID     Continuous Infusions:   lactated ringers 100 mL/hr at 07/10/19 1500     PRN Meds:acetaminophen, magnesium oxide, magnesium oxide, ondansetron, potassium chloride 10%, potassium chloride 10%, potassium chloride 10%, potassium, sodium phosphates, potassium, sodium phosphates, potassium, sodium phosphates, ramelteon, simethicone, sodium chloride 0.9%    Objective:     Vital Signs (Most Recent):  Temp: 98.9 °F (37.2 °C) (07/10/19 1500)  Pulse: 82 (07/10/19 1500)  Resp: 18 (07/10/19 1500)  BP: (!) 147/69 (07/10/19 1500)  SpO2: 95 % (07/10/19 1500)  BP Location: Left leg    Vital Signs Range (Last 24H):  Temp:  [98.9 °F (37.2 °C)-100 °F (37.8 °C)]   Pulse:  [74-98]   Resp:  [12-37]   BP: (147-201)/()   SpO2:  [92 %-96 %]   BP Location: Left leg    Physical Exam   Neck: Normal range of motion. Neck supple.   Cardiovascular: Normal rate.   Pulmonary/Chest: Effort normal.    Musculoskeletal: Normal range of motion.   Skin: Capillary refill takes less than 2 seconds.   Nursing note and vitals reviewed.      Neurological Exam:   LOC: alert  Attention Span: Good   Language: No aphasia  Articulation: No dysarthria  Orientation: Person, Place, Time   Visual Fields: Full  EOM (CN III, IV, VI): Full/intact  Pupils (CN II, III): PERRL  Facial Sensation (CN V): Normal  Facial Movement (CN VII): Symmetric facial expression    Motor: Arm left  Normal 5/5  Leg left  Normal 5/5  Arm right  Normal 5/5  Leg right Normal 5/5  Cebellar: No evidence of appendicular or axial ataxia  Sensation: Intact to light touch, temperature and vibration      Diagnostic Results     Brain Imaging      CT head w/o contrast 07/04/2019  1. Subarachnoid hemorrhage.  In the setting of no trauma suspect aneurysm     Vessel Imaging      IR angio 07/05/2019  1. 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    2.  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    3.  Distal embolism likely from the access catheter into the distal branches of the anterior middle cerebral artery on the left side treated successfully with intra-arterial Integrilin     CTA head 07/04/2019  Small anterior communicating artery aneurysm likely the origin of the recent subarachnoid bleed.    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 The estimated ejection fraction is 63%  · Concentric left ventricular remodeling.  · No wall motion abnormalities.  · Normal LV diastolic function.  · Normal right ventricular systolic function.  · Mild tricuspid regurgitation.  · Normal central venous pressure (3 mm Hg).  · The estimated PA systolic pressure is 22 mm Hg

## 2019-07-10 NOTE — ASSESSMENT & PLAN NOTE
64 F who presented with severe HA, neck pain, vomiting and found to have HH2 F2 SAH secondary to ruptured ACOM aneurysm now s/p coiling 7/5:    PBD 7 PCD5    Neuro:  --Patient admitted to Neuro-ICU; preponderance of medical care per NCC      -Q1h neurochecks while in ICU  --HOB@30  --Keppra 500 BID x7d  --TCDs daily x14d  --Nimotop 60q4 x 21d  --Continue to monitor   --Continue to follow clinically and notify NSGY immediately if any neurostatus change    CVS:  ---220 mmHg (cardene ggt; hydralazine & labetalol PRN; transition to home meds when appropriate per NCC)  --Echo normal    Pulm:  --Recc CXR for baseline resp status  --Aggressive pulm treatment per NCC    GIT/Electrolytes:  --CMP daily      -Replete electrolytes PRN per NCC  --Na goal >135  --Recc Utox/UA    Renal:  --Strict I/Os  --Goal of euvolemia or net +1L - euvolemic today    Heme/ID:  --Daily CBC      -Transfuse PRN  --Trend WBC and T    PPx:  --TEDs/SCDs  --SQH appropriate once aneurysm secured  --PPI    Dispo: Continued ICU care at this time, PT/OT when appropriate per NCC, will consider stepping down to floor later this week.

## 2019-07-10 NOTE — PROGRESS NOTES
[facility]  Neurology  Progress Note    Patient Name: Gissel Jamison  MRN: [id number]  Admission Date: 7/4/2019  Hospital Length of Stay: 6 days  Code Status: Full Code   Attending Provider: Yoshi Charles MD  Primary Care Physician: Magen Huerta Iii, MD   Principal Problem:Nontraumatic subarachnoid hemorrhage from anterior communicating artery      Subjective:     Interval History: NAEON    Current Neurological Medications:     Current Facility-Administered Medications   Medication Dose Route Frequency Provider Last Rate Last Dose    acetaminophen tablet 650 mg  650 mg Oral Q6H PRN Pedro Estevez PA-C   650 mg at 07/08/19 2330    enoxaparin injection 40 mg  40 mg Subcutaneous Q24H Dorisamy Romero, NP   40 mg at 07/10/19 1727    gabapentin capsule 100 mg  100 mg Oral TID Gonzalez Ferreira MD   100 mg at 07/10/19 1417    lactated ringers infusion   Intravenous Continuous Lizbeth Briceno  mL/hr at 07/10/19 1700      magnesium oxide tablet 800 mg  800 mg Oral PRN Gonzalez Ferreira MD        magnesium oxide tablet 800 mg  800 mg Oral PRN Gonzalez Ferreira MD        montelukast tablet 10 mg  10 mg Oral Daily Heriberto Blanchard MD   10 mg at 07/10/19 0837    niMODipine capsule 60 mg  60 mg Oral Q4H Don Alvarez MD   60 mg at 07/10/19 1727    ondansetron injection 4 mg  4 mg Intravenous Q6H PRN Pedro Estevez PA-C   4 mg at 07/09/19 1651    pantoprazole EC tablet 40 mg  40 mg Oral BID Pedro Estevez PA-C   40 mg at 07/10/19 0837    potassium chloride 10% oral solution 40 mEq  40 mEq Oral PRN Gonzalez Ferreira MD   40 mEq at 07/09/19 0510    potassium chloride 10% oral solution 40 mEq  40 mEq Oral PRN Gonzalez Ferreira MD        potassium chloride 10% oral solution 60 mEq  60 mEq Oral PRN Gonzalez Ferreira MD        potassium, sodium phosphates 280-160-250 mg packet 2 packet  2  packet Oral PRN Gonzalez Ferreira MD        potassium, sodium phosphates 280-160-250 mg packet 2 packet  2 packet Oral PRN Gonzalez Ferreira MD        potassium, sodium phosphates 280-160-250 mg packet 2 packet  2 packet Oral PRN Gonzalez Ferreira MD        ramelteon tablet 8 mg  8 mg Oral Nightly PRN Heriberto Blanchard MD   8 mg at 07/07/19 2141    simethicone chewable tablet 80 mg  1 tablet Oral TID PRN Pedro Estevez PA-C        sodium chloride 0.9% flush 10 mL  10 mL Intravenous PRN Nathanael Lopez MD         Facility-Administered Medications Ordered in Other Encounters   Medication Dose Route Frequency Provider Last Rate Last Dose    sodium chloride 0.9% flush 10 mL  10 mL Intravenous PRN Raymond Britton MD           Review of Systems   Constitutional: Negative.    HENT: Negative.    Eyes: Negative.    Respiratory: Negative.    Cardiovascular: Negative.    Gastrointestinal: Negative.    Endocrine: Negative.    Genitourinary: Negative.    Musculoskeletal: Negative.    Skin: Negative.    Allergic/Immunologic: Negative.    Neurological: Positive for headaches. Negative for dizziness, tremors, seizures, syncope, facial asymmetry, speech difficulty, weakness, light-headedness and numbness.   Hematological: Negative.    Psychiatric/Behavioral: Negative.      Objective:     Vital Signs (Most Recent):  Temp: 98.9 °F (37.2 °C) (07/10/19 1500)  Pulse: 89 (07/10/19 1700)  Resp: 20 (07/10/19 1700)  BP: (!) 181/85 (07/10/19 1700)  SpO2: 95 % (07/10/19 1700) Vital Signs (24h Range):  Temp:  [98.9 °F (37.2 °C)-100 °F (37.8 °C)] 98.9 °F (37.2 °C)  Pulse:  [74-98] 89  Resp:  [12-34] 20  SpO2:  [92 %-96 %] 95 %  BP: (147-186)/() 181/85     Weight: 69.9 kg (154 lb)  Body mass index is 25.63 kg/m².    Physical Exam   Constitutional: She is oriented to person, place, and time. She appears well-developed and well-nourished.   HENT:   Head: Normocephalic.   Eyes: Pupils  are equal, round, and reactive to light. Conjunctivae and EOM are normal. Right eye exhibits no discharge. Left eye exhibits no discharge. No scleral icterus.   Cardiovascular: Normal rate and regular rhythm.   Pulmonary/Chest: Effort normal and breath sounds normal.   Abdominal: Soft. Bowel sounds are normal.   Musculoskeletal: Normal range of motion.   Neurological: She is alert and oriented to person, place, and time. She displays normal reflexes. No cranial nerve deficit or sensory deficit. She exhibits normal muscle tone. She has a normal Finger-Nose-Finger Test. Coordination normal.   Reflex Scores:       Tricep reflexes are 2+ on the right side and 2+ on the left side.       Bicep reflexes are 2+ on the right side and 2+ on the left side.       Brachioradialis reflexes are 2+ on the right side and 2+ on the left side.       Patellar reflexes are 2+ on the right side and 2+ on the left side.       Achilles reflexes are 2+ on the right side and 2+ on the left side.  Skin: Skin is warm. Capillary refill takes less than 2 seconds.   Psychiatric: She has a normal mood and affect. Her speech is normal and behavior is normal. Judgment and thought content normal.       NEUROLOGICAL EXAMINATION:     MENTAL STATUS   Oriented to person, place, and time.   Speech: speech is normal     CRANIAL NERVES   Cranial nerves II through XII intact.     CN III, IV, VI   Pupils are equal, round, and reactive to light.  Extraocular motions are normal.     MOTOR EXAM   Muscle bulk: normal  Overall muscle tone: normal  Right arm tone: normal  Left arm tone: normal  Right arm pronator drift: absent  Left arm pronator drift: absent  Right leg tone: normal  Left leg tone: normal    Strength   Right neck flexion: 5/5  Left neck flexion: 5/5  Right neck extension: 5/5  Left neck extension: 5/5  Right deltoid: 5/5  Left deltoid: 5/5  Right biceps: 5/5  Left biceps: 5/5  Right triceps: 5/5  Left triceps: 5/5  Right wrist flexion: 5/5  Left  wrist flexion: 5/5  Right wrist extension: 5/5  Left wrist extension: 5/5  Right interossei: 5/5  Left interossei: 5/5  Right abdominals: 5/5  Left abdominals: 5/5  Right iliopsoas: 5/5  Left iliopsoas: 5/5  Right quadriceps: 5/5  Left quadriceps: 5/5  Right hamstrin/5  Left hamstrin/5  Right glutei: 5/5  Left glutei: 5/5  Right anterior tibial: 5/5  Left anterior tibial: 5/5  Right posterior tibial: 5/5  Left posterior tibial: 5/5  Right peroneal: 5/5  Left peroneal: 5/5  Right gastroc: 5/5  Left gastroc: 5/5    REFLEXES     Reflexes   Right brachioradialis: 2+  Left brachioradialis: 2+  Right biceps: 2+  Left biceps: 2+  Right triceps: 2+  Left triceps: 2+  Right patellar: 2+  Left patellar: 2+  Right achilles: 2+  Left achilles: 2+  Right plantar: normal  Left plantar: normal    SENSORY EXAM   Light touch normal.     GAIT AND COORDINATION      Coordination   Finger to nose coordination: normal    Tremor   Resting tremor: absent  Intention tremor: absent  Action tremor: absent      Significant Labs: All pertinent lab results from the past 24 hours have been reviewed.    Significant Imaging: I have reviewed and interpreted all pertinent imaging results/findings within the past 24 hours.    Assessment and Plan:     * Nontraumatic subarachnoid hemorrhage from anterior communicating artery  CTH with SAH  CTA with AComA aneurysm,   IR angio and neurosurg intervention, s/p coil  Keppra 500 BID x 7 days  TCDs daily x 14 days.  TCDs showed vasospasm. Gave fluids, monitor  SBP<160  Na>135      Other headache syndrome  2/2 SAH  7/10 one time dose of solumedrol 150 mg IV  Analgesia prn    Seizure prophylaxis  keppra 500 mg po BID     Aneurysm of anterior communicating artery  See SAH    Mild intermittent asthma with status asthmaticus  Resumed home inhaler        VTE Risk Mitigation (From admission, onward)        Ordered     enoxaparin injection 40 mg  Every 24 hours (non-standard times)      19 1091           Yamilka Melo MD  Neurology  [facility]

## 2019-07-10 NOTE — SUBJECTIVE & OBJECTIVE
Subjective:     Interval History: NAEON    Current Neurological Medications:     Current Facility-Administered Medications   Medication Dose Route Frequency Provider Last Rate Last Dose    acetaminophen tablet 650 mg  650 mg Oral Q6H PRN Pedro Estevez PA-C   650 mg at 07/08/19 2330    enoxaparin injection 40 mg  40 mg Subcutaneous Q24H Doris Romero NP   40 mg at 07/10/19 1727    gabapentin capsule 100 mg  100 mg Oral TID Gonzalez Ferreira MD   100 mg at 07/10/19 1417    lactated ringers infusion   Intravenous Continuous Lizbeth Briceno  mL/hr at 07/10/19 1700      magnesium oxide tablet 800 mg  800 mg Oral PRN Gonzalez Ferreira MD        magnesium oxide tablet 800 mg  800 mg Oral PRN Gonzalez Ferreira MD        montelukast tablet 10 mg  10 mg Oral Daily Heriberto Blanchard MD   10 mg at 07/10/19 0837    niMODipine capsule 60 mg  60 mg Oral Q4H Don Alvarez MD   60 mg at 07/10/19 1727    ondansetron injection 4 mg  4 mg Intravenous Q6H PRN Pedro Estevez PA-C   4 mg at 07/09/19 1651    pantoprazole EC tablet 40 mg  40 mg Oral BID Pedro Estevez PA-C   40 mg at 07/10/19 0837    potassium chloride 10% oral solution 40 mEq  40 mEq Oral PRN Gonzalez Ferreira MD   40 mEq at 07/09/19 0510    potassium chloride 10% oral solution 40 mEq  40 mEq Oral PRN Gonzalez Ferreira MD        potassium chloride 10% oral solution 60 mEq  60 mEq Oral PRN Gonzalez Ferreira MD        potassium, sodium phosphates 280-160-250 mg packet 2 packet  2 packet Oral PRN Gonzalez Ferreira MD        potassium, sodium phosphates 280-160-250 mg packet 2 packet  2 packet Oral PRN Gonzalez Ferreira MD        potassium, sodium phosphates 280-160-250 mg packet 2 packet  2 packet Oral PRN Gonzalez Ferreira MD        ramelteon tablet 8 mg  8 mg Oral Nightly PRN Heriberto Blanchard MD   8 mg at  07/07/19 2141    simethicone chewable tablet 80 mg  1 tablet Oral TID PRN Pedro Estevez PA-C        sodium chloride 0.9% flush 10 mL  10 mL Intravenous PRN Nathanael Lopez MD         Facility-Administered Medications Ordered in Other Encounters   Medication Dose Route Frequency Provider Last Rate Last Dose    sodium chloride 0.9% flush 10 mL  10 mL Intravenous PRN Raymond Britton MD           Review of Systems   Constitutional: Negative.    HENT: Negative.    Eyes: Negative.    Respiratory: Negative.    Cardiovascular: Negative.    Gastrointestinal: Negative.    Endocrine: Negative.    Genitourinary: Negative.    Musculoskeletal: Negative.    Skin: Negative.    Allergic/Immunologic: Negative.    Neurological: Positive for headaches. Negative for dizziness, tremors, seizures, syncope, facial asymmetry, speech difficulty, weakness, light-headedness and numbness.   Hematological: Negative.    Psychiatric/Behavioral: Negative.      Objective:     Vital Signs (Most Recent):  Temp: 98.9 °F (37.2 °C) (07/10/19 1500)  Pulse: 89 (07/10/19 1700)  Resp: 20 (07/10/19 1700)  BP: (!) 181/85 (07/10/19 1700)  SpO2: 95 % (07/10/19 1700) Vital Signs (24h Range):  Temp:  [98.9 °F (37.2 °C)-100 °F (37.8 °C)] 98.9 °F (37.2 °C)  Pulse:  [74-98] 89  Resp:  [12-34] 20  SpO2:  [92 %-96 %] 95 %  BP: (147-186)/() 181/85     Weight: 69.9 kg (154 lb)  Body mass index is 25.63 kg/m².    Physical Exam   Constitutional: She is oriented to person, place, and time. She appears well-developed and well-nourished.   HENT:   Head: Normocephalic.   Eyes: Pupils are equal, round, and reactive to light. Conjunctivae and EOM are normal. Right eye exhibits no discharge. Left eye exhibits no discharge. No scleral icterus.   Cardiovascular: Normal rate and regular rhythm.   Pulmonary/Chest: Effort normal and breath sounds normal.   Abdominal: Soft. Bowel sounds are normal.   Musculoskeletal: Normal range of motion.   Neurological: She  is alert and oriented to person, place, and time. She displays normal reflexes. No cranial nerve deficit or sensory deficit. She exhibits normal muscle tone. She has a normal Finger-Nose-Finger Test. Coordination normal.   Reflex Scores:       Tricep reflexes are 2+ on the right side and 2+ on the left side.       Bicep reflexes are 2+ on the right side and 2+ on the left side.       Brachioradialis reflexes are 2+ on the right side and 2+ on the left side.       Patellar reflexes are 2+ on the right side and 2+ on the left side.       Achilles reflexes are 2+ on the right side and 2+ on the left side.  Skin: Skin is warm. Capillary refill takes less than 2 seconds.   Psychiatric: She has a normal mood and affect. Her speech is normal and behavior is normal. Judgment and thought content normal.       NEUROLOGICAL EXAMINATION:     MENTAL STATUS   Oriented to person, place, and time.   Speech: speech is normal     CRANIAL NERVES   Cranial nerves II through XII intact.     CN III, IV, VI   Pupils are equal, round, and reactive to light.  Extraocular motions are normal.     MOTOR EXAM   Muscle bulk: normal  Overall muscle tone: normal  Right arm tone: normal  Left arm tone: normal  Right arm pronator drift: absent  Left arm pronator drift: absent  Right leg tone: normal  Left leg tone: normal    Strength   Right neck flexion: 5/5  Left neck flexion: 5/5  Right neck extension: 5/5  Left neck extension: 5/5  Right deltoid: 5/5  Left deltoid: 5/5  Right biceps: 5/5  Left biceps: 5/5  Right triceps: 5/5  Left triceps: 5/5  Right wrist flexion: 5/5  Left wrist flexion: 5/5  Right wrist extension: 5/5  Left wrist extension: 5/5  Right interossei: 5/5  Left interossei: 5/5  Right abdominals: 5/5  Left abdominals: 5/5  Right iliopsoas: 5/5  Left iliopsoas: 5/5  Right quadriceps: 5/5  Left quadriceps: 5/5  Right hamstrin/5  Left hamstrin/5  Right glutei: 5/5  Left glutei: 5/5  Right anterior tibial: 5/5  Left anterior  tibial: 5/5  Right posterior tibial: 5/5  Left posterior tibial: 5/5  Right peroneal: 5/5  Left peroneal: 5/5  Right gastroc: 5/5  Left gastroc: 5/5    REFLEXES     Reflexes   Right brachioradialis: 2+  Left brachioradialis: 2+  Right biceps: 2+  Left biceps: 2+  Right triceps: 2+  Left triceps: 2+  Right patellar: 2+  Left patellar: 2+  Right achilles: 2+  Left achilles: 2+  Right plantar: normal  Left plantar: normal    SENSORY EXAM   Light touch normal.     GAIT AND COORDINATION      Coordination   Finger to nose coordination: normal    Tremor   Resting tremor: absent  Intention tremor: absent  Action tremor: absent      Significant Labs: All pertinent lab results from the past 24 hours have been reviewed.    Significant Imaging: I have reviewed and interpreted all pertinent imaging results/findings within the past 24 hours.

## 2019-07-10 NOTE — ASSESSMENT & PLAN NOTE
65 yo with hx of breast cancer presents to the ED with complaints of intractable headache after a barium CT abdomen on 7/2. CT head on 7/4 with diffuse SAH within basal cisterns and midline- NSY was consulted. CTA on 7/4 revealed small anterior communicating artery aneurysm-. She was taken to IR on 7/5 and is now s/p coiling. Patient is admitted to [facility] for close monitoring.   7/10- Improved vasospasm on TCD, neuro exam stable, no symptoms    Antithrombotics: none (SAH)  Statins: none (SAH)  Aggressive risk factor modification:   Rehab efforts: PT/OT/SLP to eval and treat.   Diagnostics ordered/pending: daily TCDs  VTE prophylaxis: lovenox  BP parameters: secured aneurysm- keep SBP <200

## 2019-07-10 NOTE — PROGRESS NOTES
[facility]  Vascular Neurology  Comprehensive Stroke Center  Progress Note    Assessment/Plan:     * Nontraumatic subarachnoid hemorrhage from anterior communicating artery    63 yo with hx of breast cancer presents to the ED with complaints of intractable headache after a barium CT abdomen on 7/2. CT head on 7/4 with diffuse SAH within basal cisterns and midline- NSY was consulted. CTA on 7/4 revealed small anterior communicating artery aneurysm-. She was taken to IR on 7/5 and is now s/p coiling. Patient is admitted to [facility] for close monitoring.   7/10- Improved vasospasm on TCD, neuro exam stable, no symptoms    Antithrombotics: none (SAH)  Statins: none (SAH)  Aggressive risk factor modification:   Rehab efforts: PT/OT/SLP to eval and treat.   Diagnostics ordered/pending: daily TCDs  VTE prophylaxis: lovenox  BP parameters: secured aneurysm- keep SBP <200    Aneurysm of anterior communicating artery  S/p coiling- done on 7/4 7/6/19: NAEON. Patient now s/p coiling. Patient reporting improvement in headaches.   7/719: Neuro exam stable. Patient c/o difficulty sleeping and headache overnight. TCDs show increased velocities. Patient to continue PRN tylenol for HA and start ramelteon for sleep tonight.   07/08/2019 neuro exam states stable, complains of some non-constant headache worsening with moving. Some pain with lateral gaze.       STROKE DOCUMENTATION   Acute Stroke Times   Last Known Normal Date: 07/03/19  Last Known Normal Time: (unable to determine)  Symptom Onset Date: 07/03/19  Symptom Onset Time: (unclear)  Stroke Team Called Date: 07/05/19  Stroke Team Called Time: 0820  Stroke Team Arrival Date: 07/05/19  Stroke Team Arrival Time: 0825  CT Interpretation Time: 0825  Decision to Treat Time for Alteplase: (No iv alteplase candidate)  Decision to Treat Time for IR: 0825    NIH Scale:  1a. Level of Consciousness: 0-->Alert, keenly responsive  1b. LOC Questions: 0-->Answers both  questions correctly  1c. LOC Commands: 0-->Performs both tasks correctly  2. Best Gaze: 0-->Normal  3. Visual: 0-->No visual loss  4. Facial Palsy: 0-->Normal symmetrical movements  5a. Motor Arm, Left: 0-->No drift, limb holds 90 (or 45) degrees for full 10 secs  5b. Motor Arm, Right: 0-->No drift, limb holds 90 (or 45) degrees for full 10 secs  6a. Motor Leg, Left: 0-->No drift, leg holds 30 degree position for full 5 secs  6b. Motor Leg, Right: 0-->No drift, leg holds 30 degree position for full 5 secs  7. Limb Ataxia: 0-->Absent  8. Sensory: 0-->Normal, no sensory loss  9. Best Language: 0-->No aphasia, normal  10. Dysarthria: 0-->Normal  11. Extinction and Inattention (formerly Neglect): 0-->No abnormality  Total (NIH Stroke Scale): 0       Modified Mohsen Score: 0  [place] Coma Scale:    ABCD2 Score:    [id number] Score:   HAS -BLED Score:   ICH Score:0  Hunt & Rucker Classification:Grade I     Hemorrhagic change of an Ischemic Stroke: Does this patient have an ischemic stroke with hemorrhagic changes? No     Neurologic Chief Complaint: Headache, SAH    Subjective:     Interval History: Patient is seen for follow-up neurological assessment and treatment recommendations: neurological exam stays stable. Vasospasm on TCD improved.    HPI, Past Medical, Family, and Social History remains the same as documented in the initial encounter.     Review of Systems   Constitutional: Negative for fever.   HENT: Negative for trouble swallowing and voice change.    Eyes: Negative for visual disturbance.   Respiratory: Negative for shortness of breath.    Cardiovascular: Negative for palpitations.   Gastrointestinal: Positive for nausea.   Musculoskeletal: Negative for neck pain and neck stiffness.   Neurological: Negative for dizziness, tremors, seizures, facial asymmetry, speech difficulty, weakness, numbness and headaches.   Psychiatric/Behavioral: Negative for confusion.     Scheduled Meds:   enoxparin  40 mg  Subcutaneous Q24H    gabapentin  100 mg Oral TID    montelukast  10 mg Oral Daily    niMODipine  60 mg Oral Q4H    pantoprazole  40 mg Oral BID     Continuous Infusions:   lactated ringers 100 mL/hr at 07/10/19 1500     PRN Meds:acetaminophen, magnesium oxide, magnesium oxide, ondansetron, potassium chloride 10%, potassium chloride 10%, potassium chloride 10%, potassium, sodium phosphates, potassium, sodium phosphates, potassium, sodium phosphates, ramelteon, simethicone, sodium chloride 0.9%    Objective:     Vital Signs (Most Recent):  Temp: 98.9 °F (37.2 °C) (07/10/19 1500)  Pulse: 82 (07/10/19 1500)  Resp: 18 (07/10/19 1500)  BP: (!) 147/69 (07/10/19 1500)  SpO2: 95 % (07/10/19 1500)  BP Location: Left leg    Vital Signs Range (Last 24H):  Temp:  [98.9 °F (37.2 °C)-100 °F (37.8 °C)]   Pulse:  [74-98]   Resp:  [12-37]   BP: (147-201)/()   SpO2:  [92 %-96 %]   BP Location: Left leg    Physical Exam   Neck: Normal range of motion. Neck supple.   Cardiovascular: Normal rate.   Pulmonary/Chest: Effort normal.   Musculoskeletal: Normal range of motion.   Skin: Capillary refill takes less than 2 seconds.   Nursing note and vitals reviewed.      Neurological Exam:   LOC: alert  Attention Span: Good   Language: No aphasia  Articulation: No dysarthria  Orientation: Person, Place, Time   Visual Fields: Full  EOM (CN III, IV, VI): Full/intact  Pupils (CN II, III): PERRL  Facial Sensation (CN V): Normal  Facial Movement (CN VII): Symmetric facial expression    Motor: Arm left  Normal 5/5  Leg left  Normal 5/5  Arm right  Normal 5/5  Leg right Normal 5/5  Cebellar: No evidence of appendicular or axial ataxia  Sensation: Intact to light touch, temperature and vibration      Diagnostic Results     Brain Imaging      CT head w/o contrast 07/04/2019  1. Subarachnoid hemorrhage.  In the setting of no trauma suspect aneurysm     Vessel Imaging      IR angio 07/05/2019  1. 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    2.  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    3.  Distal embolism likely from the access catheter into the distal branches of the anterior middle cerebral artery on the left side treated successfully with intra-arterial Integrilin     CTA head 07/04/2019  Small anterior communicating artery aneurysm likely the origin of the recent subarachnoid bleed.    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 The estimated ejection fraction is 63%  · Concentric left ventricular remodeling.  · No wall motion abnormalities.  · Normal LV diastolic function.  · Normal right ventricular systolic function.  · Mild tricuspid regurgitation.  · Normal central venous pressure (3 mm Hg).  · The estimated PA systolic pressure is 22 mm Hg         Gianna Rice MD  Comprehensive Stroke Center  Department of Vascular Neurology   [facility]

## 2019-07-11 PROBLEM — R74.01 TRANSAMINITIS: Status: ACTIVE | Noted: 2019-07-11

## 2019-07-11 PROBLEM — R51.9 HEADACHE: Status: ACTIVE | Noted: 2019-07-11

## 2019-07-11 LAB
ALBUMIN SERPL BCP-MCNC: 3 G/DL (ref 3.5–5.2)
ALP SERPL-CCNC: 149 U/L (ref 55–135)
ALT SERPL W/O P-5'-P-CCNC: 71 U/L (ref 10–44)
ANION GAP SERPL CALC-SCNC: 10 MMOL/L (ref 8–16)
AST SERPL-CCNC: 64 U/L (ref 10–40)
BACTERIA #/AREA URNS AUTO: NORMAL /HPF
BASOPHILS # BLD AUTO: 0.03 K/UL (ref 0–0.2)
BASOPHILS NFR BLD: 0.3 % (ref 0–1.9)
BILIRUB SERPL-MCNC: 0.3 MG/DL (ref 0.1–1)
BILIRUB UR QL STRIP: NEGATIVE
BUN SERPL-MCNC: 16 MG/DL (ref 8–23)
CALCIUM SERPL-MCNC: 9.3 MG/DL (ref 8.7–10.5)
CHLORIDE SERPL-SCNC: 105 MMOL/L (ref 95–110)
CLARITY UR REFRACT.AUTO: CLEAR
CO2 SERPL-SCNC: 26 MMOL/L (ref 23–29)
COLOR UR AUTO: YELLOW
CREAT SERPL-MCNC: 0.7 MG/DL (ref 0.5–1.4)
DIFFERENTIAL METHOD: ABNORMAL
EOSINOPHIL # BLD AUTO: 0 K/UL (ref 0–0.5)
EOSINOPHIL NFR BLD: 0.1 % (ref 0–8)
ERYTHROCYTE [DISTWIDTH] IN BLOOD BY AUTOMATED COUNT: 13.4 % (ref 11.5–14.5)
EST. GFR  (AFRICAN AMERICAN): >60 ML/MIN/1.73 M^2
EST. GFR  (NON AFRICAN AMERICAN): >60 ML/MIN/1.73 M^2
GLUCOSE SERPL-MCNC: 122 MG/DL (ref 70–110)
GLUCOSE UR QL STRIP: ABNORMAL
HCT VFR BLD AUTO: 33.9 % (ref 37–48.5)
HGB BLD-MCNC: 10.6 G/DL (ref 12–16)
HGB UR QL STRIP: NEGATIVE
IMM GRANULOCYTES # BLD AUTO: 0.26 K/UL (ref 0–0.04)
IMM GRANULOCYTES NFR BLD AUTO: 2.6 % (ref 0–0.5)
KETONES UR QL STRIP: NEGATIVE
LEUKOCYTE ESTERASE UR QL STRIP: ABNORMAL
LYMPHOCYTES # BLD AUTO: 1.9 K/UL (ref 1–4.8)
LYMPHOCYTES NFR BLD: 19.2 % (ref 18–48)
MAGNESIUM SERPL-MCNC: 2.1 MG/DL (ref 1.6–2.6)
MCH RBC QN AUTO: 27.5 PG (ref 27–31)
MCHC RBC AUTO-ENTMCNC: 31.3 G/DL (ref 32–36)
MCV RBC AUTO: 88 FL (ref 82–98)
MICROSCOPIC COMMENT: NORMAL
MONOCYTES # BLD AUTO: 0.8 K/UL (ref 0.3–1)
MONOCYTES NFR BLD: 7.8 % (ref 4–15)
NEUTROPHILS # BLD AUTO: 7.1 K/UL (ref 1.8–7.7)
NEUTROPHILS NFR BLD: 70 % (ref 38–73)
NITRITE UR QL STRIP: NEGATIVE
NRBC BLD-RTO: 0 /100 WBC
OSMOLALITY SERPL: 294 MOSM/KG (ref 275–295)
PH UR STRIP: 5 [PH] (ref 5–8)
PHOSPHATE SERPL-MCNC: 3.5 MG/DL (ref 2.7–4.5)
PLATELET # BLD AUTO: 270 K/UL (ref 150–350)
PMV BLD AUTO: 9.8 FL (ref 9.2–12.9)
POTASSIUM SERPL-SCNC: 4.1 MMOL/L (ref 3.5–5.1)
PROT SERPL-MCNC: 6.1 G/DL (ref 6–8.4)
PROT UR QL STRIP: NEGATIVE
RBC # BLD AUTO: 3.86 M/UL (ref 4–5.4)
RBC #/AREA URNS AUTO: 0 /HPF (ref 0–4)
SODIUM SERPL-SCNC: 141 MMOL/L (ref 136–145)
SODIUM UR-SCNC: 47 MMOL/L (ref 20–250)
SP GR UR STRIP: 1.01 (ref 1–1.03)
SQUAMOUS #/AREA URNS AUTO: 0 /HPF
URN SPEC COLLECT METH UR: ABNORMAL
WBC # BLD AUTO: 10.09 K/UL (ref 3.9–12.7)
WBC #/AREA URNS AUTO: 4 /HPF (ref 0–5)

## 2019-07-11 PROCEDURE — 84100 ASSAY OF PHOSPHORUS: CPT

## 2019-07-11 PROCEDURE — 20000000 HC ICU ROOM

## 2019-07-11 PROCEDURE — 83735 ASSAY OF MAGNESIUM: CPT

## 2019-07-11 PROCEDURE — 80053 COMPREHEN METABOLIC PANEL: CPT

## 2019-07-11 PROCEDURE — 25000003 PHARM REV CODE 250: Performed by: UROLOGY

## 2019-07-11 PROCEDURE — 25000003 PHARM REV CODE 250: Performed by: PSYCHIATRY & NEUROLOGY

## 2019-07-11 PROCEDURE — 83930 ASSAY OF BLOOD OSMOLALITY: CPT

## 2019-07-11 PROCEDURE — 84300 ASSAY OF URINE SODIUM: CPT

## 2019-07-11 PROCEDURE — 63600175 PHARM REV CODE 636 W HCPCS: Performed by: NURSE PRACTITIONER

## 2019-07-11 PROCEDURE — 25000003 PHARM REV CODE 250: Performed by: NURSE PRACTITIONER

## 2019-07-11 PROCEDURE — 99233 SBSQ HOSP IP/OBS HIGH 50: CPT | Mod: ,,, | Performed by: PSYCHIATRY & NEUROLOGY

## 2019-07-11 PROCEDURE — 99233 SBSQ HOSP IP/OBS HIGH 50: CPT | Mod: ,,, | Performed by: NURSE PRACTITIONER

## 2019-07-11 PROCEDURE — 99233 PR SUBSEQUENT HOSPITAL CARE,LEVL III: ICD-10-PCS | Mod: ,,, | Performed by: NURSE PRACTITIONER

## 2019-07-11 PROCEDURE — 81001 URINALYSIS AUTO W/SCOPE: CPT

## 2019-07-11 PROCEDURE — 97116 GAIT TRAINING THERAPY: CPT

## 2019-07-11 PROCEDURE — 63600175 PHARM REV CODE 636 W HCPCS: Performed by: PSYCHIATRY & NEUROLOGY

## 2019-07-11 PROCEDURE — 85025 COMPLETE CBC W/AUTO DIFF WBC: CPT

## 2019-07-11 PROCEDURE — 99233 PR SUBSEQUENT HOSPITAL CARE,LEVL III: ICD-10-PCS | Mod: ,,, | Performed by: PSYCHIATRY & NEUROLOGY

## 2019-07-11 PROCEDURE — 25000003 PHARM REV CODE 250: Performed by: STUDENT IN AN ORGANIZED HEALTH CARE EDUCATION/TRAINING PROGRAM

## 2019-07-11 RX ORDER — AMOXICILLIN 250 MG
1 CAPSULE ORAL 2 TIMES DAILY
Status: DISCONTINUED | OUTPATIENT
Start: 2019-07-11 | End: 2019-07-18 | Stop reason: HOSPADM

## 2019-07-11 RX ORDER — DEXAMETHASONE SODIUM PHOSPHATE 4 MG/ML
4 INJECTION, SOLUTION INTRA-ARTICULAR; INTRALESIONAL; INTRAMUSCULAR; INTRAVENOUS; SOFT TISSUE ONCE AS NEEDED
Status: COMPLETED | OUTPATIENT
Start: 2019-07-11 | End: 2019-07-11

## 2019-07-11 RX ORDER — GABAPENTIN 100 MG/1
200 CAPSULE ORAL NIGHTLY
Status: DISCONTINUED | OUTPATIENT
Start: 2019-07-11 | End: 2019-07-18 | Stop reason: HOSPADM

## 2019-07-11 RX ADMIN — GABAPENTIN 100 MG: 100 CAPSULE ORAL at 08:07

## 2019-07-11 RX ADMIN — ACETAMINOPHEN 650 MG: 325 TABLET ORAL at 08:07

## 2019-07-11 RX ADMIN — SENNOSIDES,DOCUSATE SODIUM 1 TABLET: 8.6; 5 TABLET, FILM COATED ORAL at 03:07

## 2019-07-11 RX ADMIN — ONDANSETRON 4 MG: 2 INJECTION INTRAMUSCULAR; INTRAVENOUS at 05:07

## 2019-07-11 RX ADMIN — GABAPENTIN 100 MG: 100 CAPSULE ORAL at 03:07

## 2019-07-11 RX ADMIN — NIMODIPINE 60 MG: 30 CAPSULE, LIQUID FILLED ORAL at 03:07

## 2019-07-11 RX ADMIN — GABAPENTIN 200 MG: 100 CAPSULE ORAL at 08:07

## 2019-07-11 RX ADMIN — NIMODIPINE 60 MG: 30 CAPSULE, LIQUID FILLED ORAL at 09:07

## 2019-07-11 RX ADMIN — MONTELUKAST SODIUM 10 MG: 10 TABLET, COATED ORAL at 08:07

## 2019-07-11 RX ADMIN — NIMODIPINE 60 MG: 30 CAPSULE, LIQUID FILLED ORAL at 05:07

## 2019-07-11 RX ADMIN — DEXAMETHASONE SODIUM PHOSPHATE 4 MG: 4 INJECTION, SOLUTION INTRAMUSCULAR; INTRAVENOUS at 05:07

## 2019-07-11 RX ADMIN — SENNOSIDES,DOCUSATE SODIUM 1 TABLET: 8.6; 5 TABLET, FILM COATED ORAL at 08:07

## 2019-07-11 RX ADMIN — PANTOPRAZOLE SODIUM 40 MG: 40 TABLET, DELAYED RELEASE ORAL at 08:07

## 2019-07-11 RX ADMIN — NIMODIPINE 60 MG: 30 CAPSULE, LIQUID FILLED ORAL at 10:07

## 2019-07-11 RX ADMIN — NIMODIPINE 60 MG: 30 CAPSULE, LIQUID FILLED ORAL at 01:07

## 2019-07-11 RX ADMIN — ENOXAPARIN SODIUM 40 MG: 100 INJECTION SUBCUTANEOUS at 05:07

## 2019-07-11 RX ADMIN — SODIUM CHLORIDE, SODIUM LACTATE, POTASSIUM CHLORIDE, AND CALCIUM CHLORIDE 1000 ML: .6; .31; .03; .02 INJECTION, SOLUTION INTRAVENOUS at 08:07

## 2019-07-11 RX ADMIN — ACETAMINOPHEN 650 MG: 325 TABLET ORAL at 01:07

## 2019-07-11 NOTE — ASSESSMENT & PLAN NOTE
64 F who presented with severe HA, neck pain, vomiting and found to have HH2 F2 SAH secondary to ruptured ACOM aneurysm now s/p coiling 7/5:    PBD 8 PCD6    Neuro:  --Patient admitted to Neuro-ICU; preponderance of medical care per NCC      -Q1h neurochecks while in ICU  --HOB@30  --Keppra 500 BID x7d  --TCDs daily x14d  --Nimotop 60q4 x 21d  --Continue to monitor   --Continue to follow clinically and notify NSGY immediately if any neurostatus change    CVS:  ---220 mmHg (cardene ggt; hydralazine & labetalol PRN; transition to home meds when appropriate per NCC)  --Echo normal    Pulm:  --Recc CXR for baseline resp status  --Aggressive pulm treatment per NCC    GIT/Electrolytes:  --CMP daily      -Replete electrolytes PRN per NCC  --Na goal >135  --Recc Utox/UA    Renal:  --Strict I/Os  --Goal of euvolemia or net +1L: will give 1L     Heme/ID:  --Daily CBC      -Transfuse PRN  --Trend WBC and T    PPx:  --TEDs/SCDs  --SQH  --PPI    Dispo: Continued ICU care at this time, PT/OT when appropriate per NCC, will consider stepping down to floor later this week.

## 2019-07-11 NOTE — PLAN OF CARE
Problem: Adult Inpatient Plan of Care  Goal: Plan of Care Review  POC reviewed with pt at 0500. Pt verbalized understanding. Questions and concerns addressed. No acute events overnight. Pt progressing toward goals. Will continue to monitor. See flowsheets for full assessment and VS info.    - Oriented x 4  - Headache remains an issue  - No complaints of nausea  - NSR   - SBP <200  - LR @ 100ml/hr   - Tolerating diet well

## 2019-07-11 NOTE — PLAN OF CARE
Problem: Physical Therapy Goal  Goal: Physical Therapy Goal    Goals to be met by 7/18/2019    1. Pt will perform rolling to the R and L with SBA. - MET  2. Pt will perform supine to sit from both sides of the bed with SBA. - MET  3. Pt will perform sit to supine with SBA. - MET  4. Pt will perform sit to stand transfers with SBA.  - MET  - Pt will perform sit to stand transfer with independence and good erect standing posture  5. Pt will perform bed <> chair transfers with SBA.  6. Pt will perform gait x 150 feet with SBA with no AD      Outcome: Ongoing (interventions implemented as appropriate)  Patient participated well in therapy.  POC and goals were reviewed and remain appropriate.  Please refer to the progress note for functional mobility and activities performed during the treatment session.     Mobility: Pt is safe to perform gait 2x/day with nursing using supervision     Cristy Craven PT  7/11/2019  [phone] (pager)

## 2019-07-11 NOTE — PROGRESS NOTES
[facility]  Vascular Neurology  Comprehensive Stroke Center  Progress Note    Assessment/Plan:     * Nontraumatic subarachnoid hemorrhage from anterior communicating artery    65 yo with hx of breast cancer presents to the ED with complaints of intractable headache after a barium CT abdomen on 7/2. CT head on 7/4 with diffuse SAH within basal cisterns and midline- NSY was consulted. CTA on 7/4 revealed small anterior communicating artery aneurysm-. She was taken to IR on 7/5 and is now s/p coiling. Patient is admitted to [facility] for close monitoring.   7/11- Mild left CAROLINA territory vasospasm, neuro exam stable, moderate HA 5/10    Antithrombotics: none (SAH)  Statins: none (SAH)  Aggressive risk factor modification:   Rehab efforts: PT/OT recommending home   Diagnostics ordered/pending: daily TCDs  VTE prophylaxis: lovenox  BP parameters: secured aneurysm- keep SBP <200    Aneurysm of anterior communicating artery  S/p coiling- done on 7/4 7/6/19: NAEON. Patient now s/p coiling. Patient reporting improvement in headaches.   7/719: Neuro exam stable. Patient c/o difficulty sleeping and headache overnight. TCDs show increased velocities. Patient to continue PRN tylenol for HA and start ramelteon for sleep tonight.   07/08/2019 neuro exam states stable, complains of some non-constant headache worsening with moving. Some pain with lateral gaze.   07/11/2019 repeat HCT shows resolved SAH, no vasospasm, no hydrocephalus.    STROKE DOCUMENTATION   Acute Stroke Times   Last Known Normal Date: 07/03/19  Last Known Normal Time: (unable to determine)  Symptom Onset Date: 07/03/19  Symptom Onset Time: (unclear)  Stroke Team Called Date: 07/05/19  Stroke Team Called Time: 0820  Stroke Team Arrival Date: 07/05/19  Stroke Team Arrival Time: 0825  CT Interpretation Time: 0825  Decision to Treat Time for Alteplase: (No iv alteplase candidate)  Decision to Treat Time for IR: 0825    NIH Scale:  1a. Level of  Consciousness: 0-->Alert, keenly responsive  1b. LOC Questions: 0-->Answers both questions correctly  1c. LOC Commands: 0-->Performs both tasks correctly  2. Best Gaze: 0-->Normal  3. Visual: 0-->No visual loss  4. Facial Palsy: 0-->Normal symmetrical movements  5a. Motor Arm, Left: 0-->No drift, limb holds 90 (or 45) degrees for full 10 secs  5b. Motor Arm, Right: 0-->No drift, limb holds 90 (or 45) degrees for full 10 secs  6a. Motor Leg, Left: 0-->No drift, leg holds 30 degree position for full 5 secs  6b. Motor Leg, Right: 0-->No drift, leg holds 30 degree position for full 5 secs  7. Limb Ataxia: 0-->Absent  8. Sensory: 0-->Normal, no sensory loss  9. Best Language: 0-->No aphasia, normal  10. Dysarthria: 0-->Normal  11. Extinction and Inattention (formerly Neglect): 0-->No abnormality  Total (NIH Stroke Scale): 0       Modified Evans Score: 0  [place] Coma Scale:    ABCD2 Score:    [id number] Score:   HAS -BLED Score:   ICH Score:0  Hunt & Rucker Classification:Grade I     Hemorrhagic change of an Ischemic Stroke: Does this patient have an ischemic stroke with hemorrhagic changes? No     Neurologic Chief Complaint: Headache, SAH    Subjective:     Interval History: Patient is seen for follow-up neurological assessment and treatment recommendations: neurological exam stays stable. CTH with no hydrocephalus/vasospasm, SAH has resolved. HA 5/10, tolerable.SCD with mild spasm on LACA.    HPI, Past Medical, Family, and Social History remains the same as documented in the initial encounter.     Review of Systems   Constitutional: Negative for fever.   HENT: Negative for trouble swallowing and voice change.    Eyes: Negative for visual disturbance.   Respiratory: Negative for shortness of breath.    Cardiovascular: Negative for palpitations.   Gastrointestinal: Negative for nausea.   Musculoskeletal: Negative for neck pain and neck stiffness.   Neurological: Negative for dizziness, tremors, seizures, facial  asymmetry, speech difficulty, weakness, numbness and headaches.   Psychiatric/Behavioral: Negative for confusion.     Scheduled Meds:   enoxparin  40 mg Subcutaneous Q24H    gabapentin  100 mg Oral TID    montelukast  10 mg Oral Daily    niMODipine  60 mg Oral Q4H    pantoprazole  40 mg Oral BID     Continuous Infusions:   lactated ringers 100 mL/hr at 07/11/19 1305     PRN Meds:acetaminophen, magnesium oxide, magnesium oxide, ondansetron, potassium chloride 10%, potassium chloride 10%, potassium chloride 10%, potassium, sodium phosphates, potassium, sodium phosphates, potassium, sodium phosphates, ramelteon, simethicone, sodium chloride 0.9%    Objective:     Vital Signs (Most Recent):  Temp: 98.2 °F (36.8 °C) (07/11/19 1105)  Pulse: 83 (07/11/19 1305)  Resp: 18 (07/11/19 1305)  BP: (!) 174/74 (07/11/19 1305)  SpO2: 97 % (07/11/19 1305)  BP Location: Right leg    Vital Signs Range (Last 24H):  Temp:  [98.1 °F (36.7 °C)-99.2 °F (37.3 °C)]   Pulse:  [70-95]   Resp:  [13-34]   BP: (124-200)/(58-99)   SpO2:  [93 %-100 %]   BP Location: Right leg    Physical Exam   Neck: Normal range of motion. Neck supple.   Cardiovascular: Normal rate.   Pulmonary/Chest: Effort normal.   Musculoskeletal: Normal range of motion.   Skin: Capillary refill takes less than 2 seconds.   Nursing note and vitals reviewed.      Neurological Exam:   LOC: alert  Attention Span: Good   Language: No aphasia  Articulation: No dysarthria  Orientation: Person, Place, Time   Visual Fields: Full  EOM (CN III, IV, VI): Full/intact  Pupils (CN II, III): PERRL  Facial Sensation (CN V): Normal  Facial Movement (CN VII): Symmetric facial expression    Motor: Arm left  Normal 5/5  Leg left  Normal 5/5  Arm right  Normal 5/5  Leg right Normal 5/5  Cebellar: No evidence of appendicular or axial ataxia  Sensation: Intact to light touch, temperature and vibration      Diagnostic Results     Brain Imaging      CT head w/o contrast 07/04/2019  1.  Subarachnoid hemorrhage.  In the setting of no trauma suspect aneurysm     CT head 7/11  Subarachnoid hemorrhage almost completely resolved.  Interval coiling of a A-comm aneurysm.  No vasospasm related complication seen.  Vessel Imaging      IR angio 07/05/2019  1. 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    2.  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    3.  Distal embolism likely from the access catheter into the distal branches of the anterior middle cerebral artery on the left side treated successfully with intra-arterial Integrilin     CTA head 07/04/2019  Small anterior communicating artery aneurysm likely the origin of the recent subarachnoid bleed.    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 The estimated ejection fraction is 63%  · Concentric left ventricular remodeling.  · No wall motion abnormalities.  · Normal LV diastolic function.  · Normal right ventricular systolic function.  · Mild tricuspid regurgitation.  · Normal central venous pressure (3 mm Hg).  · The estimated PA systolic pressure is 22 mm Hg         Gianna Rice MD  Comprehensive Stroke Center  Department of Vascular Neurology   [facility]

## 2019-07-11 NOTE — SUBJECTIVE & OBJECTIVE
Interval History: naeon    Medications:  Continuous Infusions:   lactated ringers 100 mL/hr at 07/11/19 0705     Scheduled Meds:   enoxparin  40 mg Subcutaneous Q24H    gabapentin  100 mg Oral TID    montelukast  10 mg Oral Daily    niMODipine  60 mg Oral Q4H    pantoprazole  40 mg Oral BID     PRN Meds:acetaminophen, magnesium oxide, magnesium oxide, ondansetron, potassium chloride 10%, potassium chloride 10%, potassium chloride 10%, potassium, sodium phosphates, potassium, sodium phosphates, potassium, sodium phosphates, ramelteon, simethicone, sodium chloride 0.9%     Review of Systems  Objective:     Weight: 69.9 kg (154 lb)  Body mass index is 25.63 kg/m².  Vital Signs (Most Recent):  Temp: 99 °F (37.2 °C) (07/11/19 0305)  Pulse: 72 (07/11/19 0705)  Resp: 14 (07/11/19 0705)  BP: (!) 170/79 (07/11/19 0705)  SpO2: 96 % (07/11/19 0705) Vital Signs (24h Range):  Temp:  [98.7 °F (37.1 °C)-99.2 °F (37.3 °C)] 99 °F (37.2 °C)  Pulse:  [70-98] 72  Resp:  [12-34] 14  SpO2:  [93 %-96 %] 96 %  BP: (124-200)/() 170/79     Date 07/11/19 0700 - 07/12/19 0659   Shift 6785-9723 0954-8717 1169-1694 24 Hour Total   INTAKE   I.V.(mL/kg) 100(1.4)   100(1.4)   Shift Total(mL/kg) 100(1.4)   100(1.4)   OUTPUT   Shift Total(mL/kg)       Weight (kg) 69.9 69.9 69.9 69.9                   Female External Urinary Catheter 07/04/19 1806 (Active)   Skin no redness;no breakdown 7/11/2019  3:05 AM   Tolerance no signs/symptoms of discomfort 7/11/2019  3:05 AM   Suction Continuous suction at 50 mmHg 7/11/2019  3:05 AM   Date of last wick change 07/10/19 7/10/2019  7:05 PM   Time of last wick change 1100 7/10/2019  3:00 PM   Output (mL) 250 mL 7/11/2019  6:05 AM       Neurosurgery Physical Exam   AOX3, speech fluent  PERRL, EOMI, face symm, tongue midline  CAMPBELL symm  SILT  No drift    Significant Labs:  Recent Labs   Lab 07/09/19  0900 07/10/19  0210 07/11/19  0137   GLU  --  105 122*   NA  --  135* 141   K 4.1 4.0 4.1   CL  --  102  105   CO2  --  25 26   BUN  --  11 16   CREATININE  --  0.6 0.7   CALCIUM  --  9.3 9.3   MG  --  2.0 2.1     Recent Labs   Lab 07/10/19  0210 07/11/19  0137   WBC 10.70 10.09   HGB 11.0* 10.6*   HCT 35.4* 33.9*    270     No results for input(s): LABPT, INR, APTT in the last 48 hours.  Microbiology Results (last 7 days)     ** No results found for the last 168 hours. **            Significant Diagnostics:

## 2019-07-11 NOTE — PLAN OF CARE
Problem: Adult Inpatient Plan of Care  Goal: Plan of Care Review  Outcome: Ongoing (interventions implemented as appropriate)  POC reviewed with pt and family at 1400.   Pt verbalized understanding.   Questions and concerns addressed.   See flowsheets for full assessment and VS info.

## 2019-07-11 NOTE — ASSESSMENT & PLAN NOTE
-nontraumatic SAH  -HH2 F2 SAH secondary to ruptured ACOM aneurysm now s/p coiling 7/5  -NSGY following  -daily TCDs  -Nimotop  -SBP<200  -continue IVF  -PT/OT  -neuro checks q 1 hr  -CT head today (per NSGY team)-Subarachnoid hemorrhage almost completely resolved.Interval coiling of a A-comm aneurysm.No vasospasm related complication seen.

## 2019-07-11 NOTE — PROGRESS NOTES
[facility]  Neurocritical Care  Progress Note    Admit Date: 7/4/2019  Service Date: 07/11/2019  Length of Stay: 7    Subjective:     Chief Complaint: Nontraumatic subarachnoid hemorrhage from anterior communicating artery    History of Present Illness: 64 y F with medical h.o breast cancer s/p mastectomy, lumpectomy, who presented to the ER as a transfer from [facility] as SAH detected on CTH.  Patient's symptoms started on Tuesday 7/2 when she was undergoing a CT abdomen for evaluation of indigestion. She started to feel a pounding and congestion of head and ears. She did not seek medical attention till today 7/4 when she went to urgent care. Urgent care doc did not feel comfortable treating it as migraine and sent patient to ER; where CT head detected SAH. She is transferred here to [facility].  Here a CTA detected CAROLINA aneurysm and patient is being sent for angio and further neurosurg intervention,.    Hospital Course: 07/05/2019 NAEO. For conventional angiogram today ACOM aneurysm on CTA  7/7/19: add lovenox for DVT proph., initiate IVF, PT/OT, liberalize BP <200  07/08/2019 headache. Mild to moderate cerebral VSP.   07/09/2019 NAEON  7/10/2019: increased night time gabapentin, add senna doc, CT head today (ordered per NSGY)-stable, continue TCDs and nimotop, dexamethasone 4 mg once PRN (for headache).         Review of Systems  Constitutional: Denies fevers, weight loss, chills, or weakness.  Eyes: Denies changes in vision.  ENT: Denies dysphagia, nasal discharge, ear pain or discharge.  Cardiovascular: Denies chest pain, palpitations, orthopnea, or claudication.  Respiratory: Denies shortness of breath, cough, hemoptysis, or wheezing.  GI: Denies nausea/vomitting, hematochezia, melena, abd pain, or changes in appetite.  : Denies dysuria, incontinence, or hematuria.  Musculoskeletal: Denies joint pain or myalgias.  Skin/breast: Denies rashes, lumps, lesions, or discharge.  Neurologic:  Denies  dizziness, vertigo, or paresthesias. Complains of headache  Psychiatric: Denies changes in mood or hallucinations.  Endocrine: Denies polyuria, polydipsia, heat/cold intolerance.  Hematologic/Lymph: Denies lymphadenopathy, easy bruising or easy bleeding.  Allergic/Immunologic: Denies rash, rhinitis.   Objective:     Vitals:  Temp: 98.2 °F (36.8 °C)  Pulse: 81  Rhythm: normal sinus rhythm  BP: (!) 196/88  MAP (mmHg): 126  Resp: 17  SpO2: 100 %  O2 Device (Oxygen Therapy): room air    Temp  Min: 98.1 °F (36.7 °C)  Max: 99.2 °F (37.3 °C)  Pulse  Min: 70  Max: 95  BP  Min: 124/58  Max: 200/91  MAP (mmHg)  Min: 84  Max: 135  Resp  Min: 13  Max: 34  SpO2  Min: 93 %  Max: 100 %    07/10 0701 - 07/11 0700  In: 3480 [P.O.:1180; I.V.:2300]  Out: 2275 [Urine:2275]   Unmeasured Output  Urine Occurrence: 1  Stool Occurrence: 0  Pad Count: 2       Physical Exam  GA: Alert, comfortable, no acute distress.   HEENT: No scleral icterus or JVD.   Pulmonary: Clear to auscultation A/L.   Cardiac: RRR S1 & S2 w/o rubs/murmurs/gallops.   Abdominal: Bowel sounds present x 4. No appreciable hepatosplenomegaly.  Skin: No jaundice, rashes, or visible lesions.  Neuro:  --GCS: E4 V5 M6  --Mental Status: AAOX4, follows all commands, clear speech  --Pupils 3->2mm, PERRL.   --Corneal reflex, gag, cough intact.  --CAMPBELL spont and against gravity, no drift in upper extremities    Medications:  Continuous  lactated ringers Last Rate: 100 mL/hr at 07/11/19 1505   Scheduled  enoxparin 40 mg Q24H   gabapentin 100 mg TID   gabapentin 200 mg QHS   montelukast 10 mg Daily   niMODipine 60 mg Q4H   pantoprazole 40 mg BID   senna-docusate 8.6-50 mg 1 tablet BID   PRN  dexamethasone 4 mg Once PRN   magnesium oxide 800 mg PRN   magnesium oxide 800 mg PRN   ondansetron 4 mg Q6H PRN   potassium chloride 10% 40 mEq PRN   potassium chloride 10% 40 mEq PRN   potassium chloride 10% 60 mEq PRN   potassium, sodium phosphates 2 packet PRN   potassium, sodium  phosphates 2 packet PRN   potassium, sodium phosphates 2 packet PRN   ramelteon 8 mg Nightly PRN   simethicone 1 tablet TID PRN   sodium chloride 0.9% 10 mL PRN     Today I personally reviewed pertinent medications, lines/drains/airways, imaging, laboratory results,     Diet  Diet Adult Regular (IDDSI Level 7)      Assessment/Plan:     Neuro  * Nontraumatic subarachnoid hemorrhage from anterior communicating artery  -nontraumatic SAH  -HH2 F2 SAH secondary to ruptured ACOM aneurysm now s/p coiling 7/5  -NSGY following  -daily TCDs  -Nimotop  -SBP<200  -continue IVF  -PT/OT  -neuro checks q 1 hr  -CT head today (per NSGY team)-Subarachnoid hemorrhage almost completely resolved.Interval coiling of a A-comm aneurysm.No vasospasm related complication seen.    Headache  -increased gabapentin today for h/a  -dexamethasone 4mg once PRN for headache    Cerebral vasospasm  Continue nimotop and TCDs    Aneurysm of anterior communicating artery  S/p coiling      GI  Transaminitis  D/c PRN tylenol  Follow CMP          The patient is being Prophylaxed for:  Venous Thromboembolism with: Mechanical  Stress Ulcer with: None  Ventilator Pneumonia with: not applicable    Activity Orders          Diet Adult Regular (IDDSI Level 7): Regular starting at 07/08 1607        Full Code    Doris Romero NP  Neurocritical Care  [facility]

## 2019-07-11 NOTE — SUBJECTIVE & OBJECTIVE
Review of Systems  Constitutional: Denies fevers, weight loss, chills, or weakness.  Eyes: Denies changes in vision.  ENT: Denies dysphagia, nasal discharge, ear pain or discharge.  Cardiovascular: Denies chest pain, palpitations, orthopnea, or claudication.  Respiratory: Denies shortness of breath, cough, hemoptysis, or wheezing.  GI: Denies nausea/vomitting, hematochezia, melena, abd pain, or changes in appetite.  : Denies dysuria, incontinence, or hematuria.  Musculoskeletal: Denies joint pain or myalgias.  Skin/breast: Denies rashes, lumps, lesions, or discharge.  Neurologic: Denies  dizziness, vertigo, or paresthesias. Complains of headache  Psychiatric: Denies changes in mood or hallucinations.  Endocrine: Denies polyuria, polydipsia, heat/cold intolerance.  Hematologic/Lymph: Denies lymphadenopathy, easy bruising or easy bleeding.  Allergic/Immunologic: Denies rash, rhinitis.   Objective:     Vitals:  Temp: 98.2 °F (36.8 °C)  Pulse: 81  Rhythm: normal sinus rhythm  BP: (!) 196/88  MAP (mmHg): 126  Resp: 17  SpO2: 100 %  O2 Device (Oxygen Therapy): room air    Temp  Min: 98.1 °F (36.7 °C)  Max: 99.2 °F (37.3 °C)  Pulse  Min: 70  Max: 95  BP  Min: 124/58  Max: 200/91  MAP (mmHg)  Min: 84  Max: 135  Resp  Min: 13  Max: 34  SpO2  Min: 93 %  Max: 100 %    07/10 0701 - 07/11 0700  In: 3480 [P.O.:1180; I.V.:2300]  Out: 2275 [Urine:2275]   Unmeasured Output  Urine Occurrence: 1  Stool Occurrence: 0  Pad Count: 2       Physical Exam  GA: Alert, comfortable, no acute distress.   HEENT: No scleral icterus or JVD.   Pulmonary: Clear to auscultation A/L.   Cardiac: RRR S1 & S2 w/o rubs/murmurs/gallops.   Abdominal: Bowel sounds present x 4. No appreciable hepatosplenomegaly.  Skin: No jaundice, rashes, or visible lesions.  Neuro:  --GCS: E4 V5 M6  --Mental Status: AAOX4, follows all commands, clear speech  --Pupils 3->2mm, PERRL.   --Corneal reflex, gag, cough intact.  --CAMPBELL spont and against gravity, no drift in  upper extremities    Medications:  Continuous  lactated ringers Last Rate: 100 mL/hr at 07/11/19 1505   Scheduled  enoxparin 40 mg Q24H   gabapentin 100 mg TID   gabapentin 200 mg QHS   montelukast 10 mg Daily   niMODipine 60 mg Q4H   pantoprazole 40 mg BID   senna-docusate 8.6-50 mg 1 tablet BID   PRN  dexamethasone 4 mg Once PRN   magnesium oxide 800 mg PRN   magnesium oxide 800 mg PRN   ondansetron 4 mg Q6H PRN   potassium chloride 10% 40 mEq PRN   potassium chloride 10% 40 mEq PRN   potassium chloride 10% 60 mEq PRN   potassium, sodium phosphates 2 packet PRN   potassium, sodium phosphates 2 packet PRN   potassium, sodium phosphates 2 packet PRN   ramelteon 8 mg Nightly PRN   simethicone 1 tablet TID PRN   sodium chloride 0.9% 10 mL PRN     Today I personally reviewed pertinent medications, lines/drains/airways, imaging, laboratory results,     Diet  Diet Adult Regular (IDDSI Level 7)

## 2019-07-11 NOTE — ASSESSMENT & PLAN NOTE
63 yo with hx of breast cancer presents to the ED with complaints of intractable headache after a barium CT abdomen on 7/2. CT head on 7/4 with diffuse SAH within basal cisterns and midline- NSY was consulted. CTA on 7/4 revealed small anterior communicating artery aneurysm-. She was taken to IR on 7/5 and is now s/p coiling. Patient is admitted to [facility] for close monitoring.   7/11- Mild left CAROLINA territory vasospasm, neuro exam stable, moderate HA 5/10    Antithrombotics: none (SAH)  Statins: none (SAH)  Aggressive risk factor modification:   Rehab efforts: PT/OT/SLP to eval and treat.   Diagnostics ordered/pending: daily TCDs  VTE prophylaxis: lovenox  BP parameters: secured aneurysm- keep SBP <200

## 2019-07-11 NOTE — PT/OT/SLP PROGRESS
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 female admitted with a medical diagnosis of Nontraumatic subarachnoid hemorrhage from anterior communicating artery.  She continues to increase her activity tolerance daily.  She is now ambulating with close supervision.  She demonstrates altered gait kinematics such as slow gait speed and fwd flexed posture.  She had decreased ability to adjust gait speed, change directions, and maintain erect posture.  These deviations appear to be compensatory strategies for comfort d/t continuous headache.  She is safe to ambulate with nursing in the [place]anna.        Problem List:  impaired balance, impaired functional mobilty, gait instability  Rehab Prognosis:  good; patient would benefit from acute skilled PT services to address these deficits and reach maximum level of function.      Subjective   PT communicated with RN prior to therapy.     Patient comments/goals: I try to sit up in the chair during the day.  I am also walking to the bathroom now.  I really do want to get better."  Pain/Comfort:  · Pain Rating 1: 0/10  · Pain Rating Post-Intervention 1: 0/10    Recent Vital Signs: (Last documentation)  Pulse: 83 (07/11/19 1305)  BP: (!) 174/74 (07/11/19 1305)  SpO2: 97 % (07/11/19 1305)     Objective:   General Precautions: aspiration, seizure  Recent Surgery: Procedure(s) (LRB):  ANGIOGRAM-CEREBRAL, acomm aneurysm (N/A)    The patient currently has blood pressure cuff, pulse ox (continuous), telemetry, peripheral IV, PureWick.    The patient was found sitting up in the chair in ICU in NAD with her  present. She agreed to therapy and participated well.  Sit to stand transfer with " close supervision for line management.  She required multiple attempts to rise from seated position, but she completed without external assistance.  Gait 2 x 20 feet in room with close supervision for balance and line management.  Gait 10 feet to/from the bathroom with close supervision for line management. She demonstrates fwd flexed posture during gait, slow gait speed, wide HERMINIA.  During retro walking and turning, she had significant decrease in speed and step lengths.  PT instructed patient in increasing step lengths and foot clearance, maintaining balance, and awareness of the environment during mobility. Patient performed toileting and hand hygiene with set up assistance. She was left sitting up in the chair with all lines intact, call bell in reach, and family present.     Therapeutic Activities, Education, or Exercises:  Therapist educated the patient and her  on the role of PT, POC, progress with mobility, goals, discharge planning, and level of assistance with transfers and Importance of progressive mobilization, out of bed positioning, and participation in therapy.  The therapist discussed the patients current mobility status, deficits, and level of assistance with RN.  Time was also provided for active listening,  therapeutic counseling and discussion of health disposition. The therapist answered questions to patient/familys satisfaction within scope of practice.   White board updated to reflect current level of assistance.     FUNCTIONAL OUTCOME MEASURES:  AMPAC:  Turning over in bed (including adjusting bedclothes, sheets and blankets)?: 4  Sitting down on and standing up from a chair with arms (e.g., wheelchair, bedside commode, etc.): 4  Moving from lying on back to sitting on the side of the bed?: 4  Moving to and from a bed to a chair (including a wheelchair)?: 3  Need to walk in hospital room?: 3  Climbing 3-5 steps with a railing?: 3  Basic Mobility Total Score: 21    Goals:      Multidisciplinary Problems     Physical Therapy Goals        Problem: Physical Therapy Goal    Goal Priority Disciplines Outcome Goal Variances Interventions   Physical Therapy Goal     PT, PT/OT Ongoing (interventions implemented as appropriate)     Description:    Goals to be met by 7/18/2019    1. Pt will perform rolling to the R and L with SBA. - MET  2. Pt will perform supine to sit from both sides of the bed with SBA. - MET  3. Pt will perform sit to supine with SBA. - MET  4. Pt will perform sit to stand transfers with SBA.  - MET  - Pt will perform sit to stand transfer with independence and good erect standing posture  5. Pt will perform bed <> chair transfers with SBA.  6. Pt will perform gait x 150 feet with SBA with no AD                        Plan:   During this hospitalization, patient to be seen 3 x/week to address their physical therapy related impairments and improve their overall level of function.   · Plan of Care Expires: 08/06/19   Plan of Care Reviewed with: patient    This plan of care has been discussed with the patient and/or family who were involved in its development and are in agreement with the identified goals and treatment plan.     Time Tracking:     PT Received On:  07/11/19  PT Start Time:   1317    PT Stop Time:  1338  PT Total Time (min): 21 min     Billable Minutes: Gait Training 21     Cristy Craven, PT  7/11/2019  [phone] (pager)

## 2019-07-11 NOTE — PT/OT/SLP PROGRESS
Physical Therapy      Patient Name:  Gissel Jamison   MRN:  [id number]    The patient was just returning to the room with nursing following a test. The RN requested to allow the patient to rest for awhile.  PT will return this afternoon for therapy.    Cristy Craven, PT   7/11/2019

## 2019-07-11 NOTE — PLAN OF CARE
07/11/19 1424   Discharge Reassessment   Assessment Type Discharge Planning Reassessment   Provided patient/caregiver education on the expected discharge date and the discharge plan Yes   Do you have any problems affording any of your prescribed medications? TBD   Discharge Plan A Home with family   Discharge Plan B Home with family   DME Needed Upon Discharge  none   Patient choice form signed by patient/caregiver N/A   Anticipated Discharge Disposition Home   Can the patient answer the patient profile reliably? Yes, cognitively intact   How does the patient rate their overall health at the present time? Good   Describe the patient's ability to walk at the present time. No restrictions   How often would a person be available to care for the patient? Often   Number of comorbid conditions (as recorded on the chart) None   During the past month, has the patient often been bothered by feeling down, depressed or hopeless? No   During the past month, has the patient often been bothered by little interest or pleasure in doing things? No   Post-Acute Status   Post-Acute Authorization Other   Other Status No Post-Acute Service Needs   Discharge Delays None known at this time         Discharge/ My Health Packet Folder Given to patient/family:      yes      PCP:  Magen Huerta Iii, MD        Pharmacy:    [id number] - [place], [place] - [address] [facility]  [address] [facility] [address]  Phone: [phone] Fax: [phone]        Emergency Contacts:  Extended Emergency Contact Information  Primary Emergency Contact: Khari Jamison  Address: [address] [place] [address] [facility]  Home Phone: [phone]  Mobile Phone: [phone]  Relation: Spouse'    Insurance:    Payor: BLUE CROSS BLUE SHIELD / Plan: [employer] [place] PPO / Product Type: PPO /     Katie Munroe RN, CCRN-K, CCM  Neuro-Critical Care   X [phone]

## 2019-07-11 NOTE — PROGRESS NOTES
Transported pt to CT,via wheelchair, portable monitor, ambubag, with RN x2. Returned to room 9087. Pt up in chair, attached to ICU monitor. Call light in reach. VSS. Will continue to monitor.

## 2019-07-11 NOTE — SUBJECTIVE & OBJECTIVE
Neurologic Chief Complaint: Headache, SAH    Subjective:     Interval History: Patient is seen for follow-up neurological assessment and treatment recommendations: neurological exam stays stable. CTH with no hydrocephalus/vasospasm, SAH has resolved. HA 5/10, tolerable.SCD with mild spasm on LACA.    HPI, Past Medical, Family, and Social History remains the same as documented in the initial encounter.     Review of Systems   Constitutional: Negative for fever.   HENT: Negative for trouble swallowing and voice change.    Eyes: Negative for visual disturbance.   Respiratory: Negative for shortness of breath.    Cardiovascular: Negative for palpitations.   Gastrointestinal: Negative for nausea.   Musculoskeletal: Negative for neck pain and neck stiffness.   Neurological: Negative for dizziness, tremors, seizures, facial asymmetry, speech difficulty, weakness, numbness and headaches.   Psychiatric/Behavioral: Negative for confusion.     Scheduled Meds:   enoxparin  40 mg Subcutaneous Q24H    gabapentin  100 mg Oral TID    montelukast  10 mg Oral Daily    niMODipine  60 mg Oral Q4H    pantoprazole  40 mg Oral BID     Continuous Infusions:   lactated ringers 100 mL/hr at 07/11/19 1305     PRN Meds:acetaminophen, magnesium oxide, magnesium oxide, ondansetron, potassium chloride 10%, potassium chloride 10%, potassium chloride 10%, potassium, sodium phosphates, potassium, sodium phosphates, potassium, sodium phosphates, ramelteon, simethicone, sodium chloride 0.9%    Objective:     Vital Signs (Most Recent):  Temp: 98.2 °F (36.8 °C) (07/11/19 1105)  Pulse: 83 (07/11/19 1305)  Resp: 18 (07/11/19 1305)  BP: (!) 174/74 (07/11/19 1305)  SpO2: 97 % (07/11/19 1305)  BP Location: Right leg    Vital Signs Range (Last 24H):  Temp:  [98.1 °F (36.7 °C)-99.2 °F (37.3 °C)]   Pulse:  [70-95]   Resp:  [13-34]   BP: (124-200)/(58-99)   SpO2:  [93 %-100 %]   BP Location: Right leg    Physical Exam   Neck: Normal range of motion. Neck  supple.   Cardiovascular: Normal rate.   Pulmonary/Chest: Effort normal.   Musculoskeletal: Normal range of motion.   Skin: Capillary refill takes less than 2 seconds.   Nursing note and vitals reviewed.      Neurological Exam:   LOC: alert  Attention Span: Good   Language: No aphasia  Articulation: No dysarthria  Orientation: Person, Place, Time   Visual Fields: Full  EOM (CN III, IV, VI): Full/intact  Pupils (CN II, III): PERRL  Facial Sensation (CN V): Normal  Facial Movement (CN VII): Symmetric facial expression    Motor: Arm left  Normal 5/5  Leg left  Normal 5/5  Arm right  Normal 5/5  Leg right Normal 5/5  Cebellar: No evidence of appendicular or axial ataxia  Sensation: Intact to light touch, temperature and vibration      Diagnostic Results     Brain Imaging      CT head w/o contrast 07/04/2019  1. Subarachnoid hemorrhage.  In the setting of no trauma suspect aneurysm     CT head 7/11  Subarachnoid hemorrhage almost completely resolved.  Interval coiling of a A-comm aneurysm.  No vasospasm related complication seen.  Vessel Imaging      IR angio 07/05/2019  1. 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    2.  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    3.  Distal embolism likely from the access catheter into the distal branches of the anterior middle cerebral artery on the left side treated successfully with intra-arterial Integrilin     CTA head 07/04/2019  Small anterior communicating artery aneurysm likely the origin of the recent subarachnoid bleed.    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 The estimated ejection fraction is 63%  · Concentric left ventricular remodeling.  · No wall motion abnormalities.  · Normal LV diastolic function.  · Normal right ventricular systolic function.  · Mild tricuspid  regurgitation.  · Normal central venous pressure (3 mm Hg).  · The estimated PA systolic pressure is 22 mm Hg

## 2019-07-11 NOTE — PROGRESS NOTES
[facility]  Neurosurgery  Progress Note    Subjective:     History of Present Illness: 65 yo with hx of breast cancer presents to the ED with complaints of headache after a barium CT abdomen on Tuesday. Reports intractable headache which began 2 days ago. Associated n/v, neck pain. Denies visual changes, weakness, paresthesias, b/b dysfunction. No trauma, falls, or LOC. She does not take antiplatelet therapy or anticoagulants. Currently on a cardene drip started at the outside hospital. She states she is tired from the morphine given prior to transport. Family at bedside.       Post-Op Info:  Procedure(s) (LRB):  ANGIOGRAM-CEREBRAL, acomm aneurysm (N/A)   6 Days Post-Op     Interval History: naeon    Medications:  Continuous Infusions:   lactated ringers 100 mL/hr at 07/11/19 0705     Scheduled Meds:   enoxparin  40 mg Subcutaneous Q24H    gabapentin  100 mg Oral TID    montelukast  10 mg Oral Daily    niMODipine  60 mg Oral Q4H    pantoprazole  40 mg Oral BID     PRN Meds:acetaminophen, magnesium oxide, magnesium oxide, ondansetron, potassium chloride 10%, potassium chloride 10%, potassium chloride 10%, potassium, sodium phosphates, potassium, sodium phosphates, potassium, sodium phosphates, ramelteon, simethicone, sodium chloride 0.9%     Review of Systems  Objective:     Weight: 69.9 kg (154 lb)  Body mass index is 25.63 kg/m².  Vital Signs (Most Recent):  Temp: 99 °F (37.2 °C) (07/11/19 0305)  Pulse: 72 (07/11/19 0705)  Resp: 14 (07/11/19 0705)  BP: (!) 170/79 (07/11/19 0705)  SpO2: 96 % (07/11/19 0705) Vital Signs (24h Range):  Temp:  [98.7 °F (37.1 °C)-99.2 °F (37.3 °C)] 99 °F (37.2 °C)  Pulse:  [70-98] 72  Resp:  [12-34] 14  SpO2:  [93 %-96 %] 96 %  BP: (124-200)/() 170/79     Date 07/11/19 0700 - 07/12/19 0659   Shift 9161-9698 0917-3967 6973-4162 24 Hour Total   INTAKE   I.V.(mL/kg) 100(1.4)   100(1.4)   Shift Total(mL/kg) 100(1.4)   100(1.4)   OUTPUT   Shift Total(mL/kg)        Weight (kg) 69.9 69.9 69.9 69.9                   Female External Urinary Catheter 07/04/19 1806 (Active)   Skin no redness;no breakdown 7/11/2019  3:05 AM   Tolerance no signs/symptoms of discomfort 7/11/2019  3:05 AM   Suction Continuous suction at 50 mmHg 7/11/2019  3:05 AM   Date of last wick change 07/10/19 7/10/2019  7:05 PM   Time of last wick change 1100 7/10/2019  3:00 PM   Output (mL) 250 mL 7/11/2019  6:05 AM       Neurosurgery Physical Exam   AOX3, speech fluent  PERRL, EOMI, face symm, tongue midline  CAMPBELL symm  SILT  No drift    Significant Labs:  Recent Labs   Lab 07/09/19  0900 07/10/19  0210 07/11/19  0137   GLU  --  105 122*   NA  --  135* 141   K 4.1 4.0 4.1   CL  --  102 105   CO2  --  25 26   BUN  --  11 16   CREATININE  --  0.6 0.7   CALCIUM  --  9.3 9.3   MG  --  2.0 2.1     Recent Labs   Lab 07/10/19  0210 07/11/19  0137   WBC 10.70 10.09   HGB 11.0* 10.6*   HCT 35.4* 33.9*    270     No results for input(s): LABPT, INR, APTT in the last 48 hours.  Microbiology Results (last 7 days)     ** No results found for the last 168 hours. **            Significant Diagnostics:      Assessment/Plan:     Aneurysm of anterior communicating artery  64 F who presented with severe HA, neck pain, vomiting and found to have HH2 F2 SAH secondary to ruptured ACOM aneurysm now s/p coiling 7/5:    PBD 8 PCD6    Neuro:  --Patient admitted to Neuro-ICU; preponderance of medical care per NCC      -Q1h neurochecks while in ICU  --HOB@30  --Keppra 500 BID x7d  --TCDs daily x14d  --Nimotop 60q4 x 21d  --Continue to monitor   --Continue to follow clinically and notify NSGY immediately if any neurostatus change    CVS:  ---220 mmHg (cardene ggt; hydralazine & labetalol PRN; transition to home meds when appropriate per NCC)  --Echo normal    Pulm:  --Recc CXR for baseline resp status  --Aggressive pulm treatment per NCC    GIT/Electrolytes:  --CMP daily      -Replete electrolytes PRN per NCC  --Na goal  >135  --Recc Utox/UA    Renal:  --Strict I/Os  --Goal of euvolemia or net +1L: will give 1L     Heme/ID:  --Daily CBC      -Transfuse PRN  --Trend WBC and T    PPx:  --TEDs/SCDs  --SQH  --PPI    Dispo: Continued ICU care at this time, PT/OT when appropriate per NCC, will consider stepping down to floor later this week.        Wilfredo Mccartney, DO  Neurosurgery  [facility]

## 2019-07-12 PROBLEM — D64.9 ANEMIA: Status: ACTIVE | Noted: 2019-07-12

## 2019-07-12 LAB
ALBUMIN SERPL BCP-MCNC: 3 G/DL (ref 3.5–5.2)
ALP SERPL-CCNC: 157 U/L (ref 55–135)
ALT SERPL W/O P-5'-P-CCNC: 93 U/L (ref 10–44)
ANION GAP SERPL CALC-SCNC: 11 MMOL/L (ref 8–16)
AST SERPL-CCNC: 62 U/L (ref 10–40)
BASOPHILS # BLD AUTO: 0.04 K/UL (ref 0–0.2)
BASOPHILS NFR BLD: 0.3 % (ref 0–1.9)
BILIRUB SERPL-MCNC: 0.3 MG/DL (ref 0.1–1)
BUN SERPL-MCNC: 12 MG/DL (ref 8–23)
CALCIUM SERPL-MCNC: 9.2 MG/DL (ref 8.7–10.5)
CHLORIDE SERPL-SCNC: 104 MMOL/L (ref 95–110)
CO2 SERPL-SCNC: 24 MMOL/L (ref 23–29)
CREAT SERPL-MCNC: 0.7 MG/DL (ref 0.5–1.4)
DIFFERENTIAL METHOD: ABNORMAL
EOSINOPHIL # BLD AUTO: 0 K/UL (ref 0–0.5)
EOSINOPHIL NFR BLD: 0.3 % (ref 0–8)
ERYTHROCYTE [DISTWIDTH] IN BLOOD BY AUTOMATED COUNT: 13.5 % (ref 11.5–14.5)
EST. GFR  (AFRICAN AMERICAN): >60 ML/MIN/1.73 M^2
EST. GFR  (NON AFRICAN AMERICAN): >60 ML/MIN/1.73 M^2
GLUCOSE SERPL-MCNC: 107 MG/DL (ref 70–110)
HCT VFR BLD AUTO: 35.9 % (ref 37–48.5)
HGB BLD-MCNC: 10.8 G/DL (ref 12–16)
IMM GRANULOCYTES # BLD AUTO: 0.21 K/UL (ref 0–0.04)
IMM GRANULOCYTES NFR BLD AUTO: 1.8 % (ref 0–0.5)
LYMPHOCYTES # BLD AUTO: 2 K/UL (ref 1–4.8)
LYMPHOCYTES NFR BLD: 16.5 % (ref 18–48)
MAGNESIUM SERPL-MCNC: 2 MG/DL (ref 1.6–2.6)
MCH RBC QN AUTO: 27.3 PG (ref 27–31)
MCHC RBC AUTO-ENTMCNC: 30.1 G/DL (ref 32–36)
MCV RBC AUTO: 91 FL (ref 82–98)
MONOCYTES # BLD AUTO: 0.6 K/UL (ref 0.3–1)
MONOCYTES NFR BLD: 5.4 % (ref 4–15)
NEUTROPHILS # BLD AUTO: 9 K/UL (ref 1.8–7.7)
NEUTROPHILS NFR BLD: 75.7 % (ref 38–73)
NRBC BLD-RTO: 0 /100 WBC
PHOSPHATE SERPL-MCNC: 4.4 MG/DL (ref 2.7–4.5)
PLATELET # BLD AUTO: 283 K/UL (ref 150–350)
PMV BLD AUTO: 9.5 FL (ref 9.2–12.9)
POTASSIUM SERPL-SCNC: 4.4 MMOL/L (ref 3.5–5.1)
PROT SERPL-MCNC: 6.1 G/DL (ref 6–8.4)
RBC # BLD AUTO: 3.95 M/UL (ref 4–5.4)
SODIUM SERPL-SCNC: 139 MMOL/L (ref 136–145)
WBC # BLD AUTO: 11.88 K/UL (ref 3.9–12.7)

## 2019-07-12 PROCEDURE — 25000003 PHARM REV CODE 250: Performed by: NURSE PRACTITIONER

## 2019-07-12 PROCEDURE — 25000003 PHARM REV CODE 250: Performed by: PSYCHIATRY & NEUROLOGY

## 2019-07-12 PROCEDURE — 20600001 HC STEP DOWN PRIVATE ROOM

## 2019-07-12 PROCEDURE — 99233 PR SUBSEQUENT HOSPITAL CARE,LEVL III: ICD-10-PCS | Mod: ,,, | Performed by: PSYCHIATRY & NEUROLOGY

## 2019-07-12 PROCEDURE — 80053 COMPREHEN METABOLIC PANEL: CPT

## 2019-07-12 PROCEDURE — 94761 N-INVAS EAR/PLS OXIMETRY MLT: CPT

## 2019-07-12 PROCEDURE — 84100 ASSAY OF PHOSPHORUS: CPT

## 2019-07-12 PROCEDURE — 83735 ASSAY OF MAGNESIUM: CPT

## 2019-07-12 PROCEDURE — 99233 SBSQ HOSP IP/OBS HIGH 50: CPT | Mod: ,,, | Performed by: PSYCHIATRY & NEUROLOGY

## 2019-07-12 PROCEDURE — 63600175 PHARM REV CODE 636 W HCPCS: Performed by: NURSE PRACTITIONER

## 2019-07-12 PROCEDURE — 85025 COMPLETE CBC W/AUTO DIFF WBC: CPT

## 2019-07-12 PROCEDURE — 25000003 PHARM REV CODE 250: Performed by: UROLOGY

## 2019-07-12 PROCEDURE — 97535 SELF CARE MNGMENT TRAINING: CPT

## 2019-07-12 RX ADMIN — NIMODIPINE 60 MG: 30 CAPSULE, LIQUID FILLED ORAL at 05:07

## 2019-07-12 RX ADMIN — NIMODIPINE 60 MG: 30 CAPSULE, LIQUID FILLED ORAL at 02:07

## 2019-07-12 RX ADMIN — NIMODIPINE 60 MG: 30 CAPSULE, LIQUID FILLED ORAL at 10:07

## 2019-07-12 RX ADMIN — MONTELUKAST SODIUM 10 MG: 10 TABLET, COATED ORAL at 08:07

## 2019-07-12 RX ADMIN — GABAPENTIN 100 MG: 100 CAPSULE ORAL at 08:07

## 2019-07-12 RX ADMIN — NIMODIPINE 60 MG: 30 CAPSULE, LIQUID FILLED ORAL at 01:07

## 2019-07-12 RX ADMIN — GABAPENTIN 200 MG: 100 CAPSULE ORAL at 09:07

## 2019-07-12 RX ADMIN — ENOXAPARIN SODIUM 40 MG: 100 INJECTION SUBCUTANEOUS at 05:07

## 2019-07-12 RX ADMIN — PANTOPRAZOLE SODIUM 40 MG: 40 TABLET, DELAYED RELEASE ORAL at 08:07

## 2019-07-12 RX ADMIN — SENNOSIDES,DOCUSATE SODIUM 1 TABLET: 8.6; 5 TABLET, FILM COATED ORAL at 08:07

## 2019-07-12 RX ADMIN — PANTOPRAZOLE SODIUM 40 MG: 40 TABLET, DELAYED RELEASE ORAL at 09:07

## 2019-07-12 RX ADMIN — NIMODIPINE 60 MG: 30 CAPSULE, LIQUID FILLED ORAL at 09:07

## 2019-07-12 RX ADMIN — SENNOSIDES,DOCUSATE SODIUM 1 TABLET: 8.6; 5 TABLET, FILM COATED ORAL at 09:07

## 2019-07-12 RX ADMIN — GABAPENTIN 100 MG: 100 CAPSULE ORAL at 02:07

## 2019-07-12 NOTE — PT/OT/SLP PROGRESS
Occupational Therapy   Treatment    Name: Gissel Jamison  MRN: [id number]  Admitting Diagnosis:  Nontraumatic subarachnoid hemorrhage from anterior communicating artery  7 Days Post-Op    Recommendations:     Discharge Recommendations: home  Discharge Equipment Recommendations:  none  Barriers to discharge:  None    Assessment:     Gissel Jamison is a 64 y.o. female with a medical diagnosis of Nontraumatic subarachnoid hemorrhage from anterior communicating artery.  She presents with performance deficits affecting function are weakness, impaired self care skills, impaired endurance, impaired functional mobilty.     Rehab Prognosis:  Good; patient would benefit from acute skilled OT services to address these deficits and reach maximum level of function.       Plan:     Patient to be seen 2 x/week to address the above listed problems via self-care/home management, therapeutic activities, therapeutic exercises, sensory integration  · Plan of Care Expires: 08/04/19  · Plan of Care Reviewed with: patient, spouse    Subjective   MD reported that the patient will likely transfer to the floor today.  Pain/Comfort:  · Pain Rating 1: 0/10  · Pain Rating Post-Intervention 1: 0/10    Objective:     Communicated with: Nurse prior to session.  Patient found supine with bed alarm, blood pressure cuff, SCD, telemetry, peripheral IV, pulse ox (continuous), PureWick, oxygen upon OT entry to room.    General Precautions: Standard, aspiration, fall, seizure   Orthopedic Precautions:N/A   Braces: N/A     Occupational Performance:     Bed Mobility:    · Patient completed Rolling/Turning to Left with  modified independence  · Patient completed Rolling/Turning to Right with modified independence  · Patient completed Supine to Sit with modified independence  · Patient completed Sit to Supine with modified independence     Functional Mobility/Transfers:  · Patient completed Sit <> Stand Transfer with supervision  with  no assistive device    · Patient completed Bed <> Chair Transfer using Stand Pivot technique with supervision with no assistive device    Activities of Daily Living:  · Grooming: supervision    · Upper Body Dressing: supervision    · Lower Body Dressing: supervision        [facility] 6 Click ADL: 19    Patient left supine with all lines intact, call button in reach and bed alarm onEducation:      GOALS:   Multidisciplinary Problems     Occupational Therapy Goals        Problem: Occupational Therapy Goal    Goal Priority Disciplines Outcome Interventions   Occupational Therapy Goal     OT, PT/OT     Description:  Goals set 7/7 to be addressed for 14 days with expiration date, 7/21:  Patient will increase functional independence with ADLs by performing:  Patient will demonstrate stand pivot transfers with modified independence.   Not met  Patient will demonstrate grooming while standing with modified independence.   Not met  Patient will demonstrate upper body dressing with modified independence.   Not met  Patient will demonstrate lower body dressing with modified independence.   Not met  Patient will demonstrate toileting with modified independence.   Not met  Patient will demonstrate bathing while seated EOB with modified independence.   Not met                        Time Tracking:     OT Date of Treatment: 07/12/19  OT Start Time: 0625  OT Stop Time: 0648  OT Total Time (min): 23 min    Billable Minutes:Self Care/Home Management 23    LARA Batres  7/12/2019

## 2019-07-12 NOTE — PROGRESS NOTES
[facility]  Neurosurgery  Progress Note    Subjective:     History of Present Illness: 65 yo with hx of breast cancer presents to the ED with complaints of headache after a barium CT abdomen on Tuesday. Reports intractable headache which began 2 days ago. Associated n/v, neck pain. Denies visual changes, weakness, paresthesias, b/b dysfunction. No trauma, falls, or LOC. She does not take antiplatelet therapy or anticoagulants. Currently on a cardene drip started at the outside hospital. She states she is tired from the morphine given prior to transport. Family at bedside.       Post-Op Info:  Procedure(s) (LRB):  ANGIOGRAM-CEREBRAL, acomm aneurysm (N/A)   7 Days Post-Op     Interval History: naeon    Medications:  Continuous Infusions:   lactated ringers 100 mL/hr at 07/12/19 1005     Scheduled Meds:   enoxparin  40 mg Subcutaneous Q24H    gabapentin  100 mg Oral TID    gabapentin  200 mg Oral QHS    montelukast  10 mg Oral Daily    niMODipine  60 mg Oral Q4H    pantoprazole  40 mg Oral BID    senna-docusate 8.6-50 mg  1 tablet Oral BID     PRN Meds:magnesium oxide, magnesium oxide, ondansetron, potassium chloride 10%, potassium chloride 10%, potassium chloride 10%, potassium, sodium phosphates, potassium, sodium phosphates, potassium, sodium phosphates, ramelteon, simethicone, sodium chloride 0.9%     Review of Systems  Objective:     Weight: 69.9 kg (154 lb)  Body mass index is 25.63 kg/m².  Vital Signs (Most Recent):  Temp: 97.4 °F (36.3 °C) (07/12/19 0705)  Pulse: 79 (07/12/19 1005)  Resp: 11 (07/12/19 1005)  BP: (!) 153/65 (07/12/19 1005)  SpO2: 97 % (07/12/19 1005) Vital Signs (24h Range):  Temp:  [97.4 °F (36.3 °C)-99.6 °F (37.6 °C)] 97.4 °F (36.3 °C)  Pulse:  [] 79  Resp:  [11-25] 11  SpO2:  [93 %-100 %] 97 %  BP: (139-200)/(63-97) 153/65     Date 07/12/19 0700 - 07/13/19 0659   Shift 1883-3341 2784-0445 8428-8596 24 Hour Total   INTAKE   P.O. 200   200   I.V.(mL/kg)  406.7(5.8)   406.7(5.8)   Shift Total(mL/kg) 606.7(8.7)   606.7(8.7)   OUTPUT   Urine(mL/kg/hr) 500   500   Shift Total(mL/kg) 500(7.2)   500(7.2)   Weight (kg) 69.9 69.9 69.9 69.9                   Female External Urinary Catheter 07/04/19 1806 (Active)   Skin no redness;no breakdown 7/12/2019  7:05 AM   Tolerance no signs/symptoms of discomfort 7/12/2019  7:05 AM   Suction Continuous suction at 40 mmHg 7/12/2019  7:05 AM   Date of last wick change 07/12/19 7/12/2019  7:05 AM   Time of last wick change 0905 7/12/2019  7:05 AM   Output (mL) 500 mL 7/12/2019  8:05 AM       Neurosurgery Physical Exam   AOX3, speech fluent  PERRL, EOMI, face symm, tongue midline  CAMPBELL symm  SILT  No drift    Significant Labs:  Recent Labs   Lab 07/11/19  0137 07/12/19  0112   * 107    139   K 4.1 4.4    104   CO2 26 24   BUN 16 12   CREATININE 0.7 0.7   CALCIUM 9.3 9.2   MG 2.1 2.0     Recent Labs   Lab 07/11/19 0137 07/12/19  0112   WBC 10.09 11.88   HGB 10.6* 10.8*   HCT 33.9* 35.9*    283     No results for input(s): LABPT, INR, APTT in the last 48 hours.  Microbiology Results (last 7 days)     ** No results found for the last 168 hours. **            Significant Diagnostics:  CT head: stable from prior    Assessment/Plan:     Aneurysm of anterior communicating artery  64 F who presented with severe HA, neck pain, vomiting and found to have HH2 F2 SAH secondary to ruptured ACOM aneurysm now s/p coiling 7/5:    PBD 9 PCD7    Neuro:  --Patient admitted to Neuro-ICU; preponderance of medical care per NCC      -Q1h neurochecks while in ICU  --HOB@30  --Keppra 500 BID x7d  --TCDs daily x14d  --Nimotop 60q4 x 21d  --Continue to monitor   --Continue to follow clinically and notify NSGY immediately if any neurostatus change    CVS:  ---220 mmHg (cardene ggt; hydralazine & labetalol PRN; transition to home meds when appropriate per NCC)  --Echo normal    Pulm:  --Recc CXR for baseline resp  status  --Aggressive pulm treatment per NCC    GIT/Electrolytes:  --CMP daily      -Replete electrolytes PRN per NCC  --Na goal >135  --Recc Utox/UA    Renal:  --Strict I/Os  --Goal of euvolemia or net +1L    Heme/ID:  --Daily CBC      -Transfuse PRN  --Trend WBC and T    PPx:  --TEDs/SCDs  --SQH  --PPI    Dispo: Consider step down to floor today.        Wilfredo Mccartney, DO  Neurosurgery  [facility]

## 2019-07-12 NOTE — PLAN OF CARE
Problem: Occupational Therapy Goal  Goal: Occupational Therapy Goal  Goals set 7/7 to be addressed for 14 days with expiration date, 7/21:  Patient will increase functional independence with ADLs by performing:  Patient will demonstrate stand pivot transfers with modified independence.   Not met  Patient will demonstrate grooming while standing with modified independence.   Not met  Patient will demonstrate upper body dressing with modified independence.   Not met  Patient will demonstrate lower body dressing with modified independence.   Not met  Patient will demonstrate toileting with modified independence.   Not met  Patient will demonstrate bathing while seated EOB with modified independence.   Not met       Goals remain appropriate.  LARA Batres  7/12/2019

## 2019-07-12 NOTE — PLAN OF CARE
Problem: Adult Inpatient Plan of Care  Goal: Plan of Care Review  Outcome: Ongoing (interventions implemented as appropriate)  POC reviewed with pt and family at 1400.   Pt verbalized understanding.   Questions and concerns addressed.   See flowsheets for full assessment and VS info.    NPO at midnight. Ultrasound abdomen in the morning.

## 2019-07-12 NOTE — ASSESSMENT & PLAN NOTE
64 F who presented with severe HA, neck pain, vomiting and found to have HH2 F2 SAH secondary to ruptured ACOM aneurysm now s/p coiling 7/5:    PBD 9 PCD7    Neuro:  --Patient admitted to Neuro-ICU; preponderance of medical care per NCC      -Q1h neurochecks while in ICU  --HOB@30  --Keppra 500 BID x7d  --TCDs daily x14d  --Nimotop 60q4 x 21d  --Continue to monitor   --Continue to follow clinically and notify NSGY immediately if any neurostatus change    CVS:  ---220 mmHg (cardene ggt; hydralazine & labetalol PRN; transition to home meds when appropriate per NCC)  --Echo normal    Pulm:  --Recc CXR for baseline resp status  --Aggressive pulm treatment per NCC    GIT/Electrolytes:  --CMP daily      -Replete electrolytes PRN per NCC  --Na goal >135  --Recc Utox/UA    Renal:  --Strict I/Os  --Goal of euvolemia or net +1L    Heme/ID:  --Daily CBC      -Transfuse PRN  --Trend WBC and T    PPx:  --TEDs/SCDs  --SQH  --PPI    Dispo: Consider step down to floor today.

## 2019-07-12 NOTE — PROGRESS NOTES
[facility]  Vascular Neurology  Comprehensive Stroke Center  Progress Note    Assessment/Plan:     * Nontraumatic subarachnoid hemorrhage from anterior communicating artery    65 yo with hx of breast cancer presents to the ED with complaints of intractable headache after a barium CT abdomen on 7/2. CT head on 7/4 with diffuse SAH within basal cisterns and midline- NSY was consulted. CTA on 7/4 revealed small anterior communicating artery aneurysm-. She was taken to IR on 7/5 and is now s/p coiling. Patient is admitted to [facility] for close monitoring.   Mild left [place] territory vasospasm seen on 7/11 TCD. TCD from today pending.    Antithrombotics: none (SAH)  Statins: none (SAH)  Aggressive risk factor modification:   Rehab efforts: PT/OT/SLP to eval and treat.   Diagnostics ordered/pending: daily TCDs  VTE prophylaxis: lovenox  BP parameters: secured aneurysm- keep SBP <200    Aneurysm of anterior communicating artery  S/p coiling- done on 7/4 7/6/19: NAEON. Patient now s/p coiling. Patient reporting improvement in headaches.   7/719: Neuro exam stable. Patient c/o difficulty sleeping and headache overnight. TCDs show increased velocities. Patient to continue PRN tylenol for HA and start ramelteon for sleep tonight.   07/08/2019 neuro exam states stable, complains of some non-constant headache worsening with moving. Some pain with lateral gaze.   07/11/2019 repeat HCT shows resolved SAH, no vasospasm, no hydrocephalus.  7/12: Neuro exam normal. No headache this AM. Patient states headache was severe yesterday afternoon. TCD from today pending.     STROKE DOCUMENTATION   Acute Stroke Times   Last Known Normal Date: 07/03/19  Last Known Normal Time: (unable to determine)  Symptom Onset Date: 07/03/19  Symptom Onset Time: (unclear)  Stroke Team Called Date: 07/05/19  Stroke Team Called Time: 0820  Stroke Team Arrival Date: 07/05/19  Stroke Team Arrival Time: 0825  CT Interpretation Time:  0825  Decision to Treat Time for Alteplase: (No iv alteplase candidate)  Decision to Treat Time for IR: 0825    NIH Scale:  1a. Level of Consciousness: 0-->Alert, keenly responsive  1b. LOC Questions: 0-->Answers both questions correctly  1c. LOC Commands: 0-->Performs both tasks correctly  2. Best Gaze: 0-->Normal  3. Visual: 0-->No visual loss  4. Facial Palsy: 0-->Normal symmetrical movements  5a. Motor Arm, Left: 0-->No drift, limb holds 90 (or 45) degrees for full 10 secs  5b. Motor Arm, Right: 0-->No drift, limb holds 90 (or 45) degrees for full 10 secs  6a. Motor Leg, Left: 0-->No drift, leg holds 30 degree position for full 5 secs  6b. Motor Leg, Right: 0-->No drift, leg holds 30 degree position for full 5 secs  7. Limb Ataxia: 0-->Absent  8. Sensory: 0-->Normal, no sensory loss  9. Best Language: 0-->No aphasia, normal  10. Dysarthria: 0-->Normal  11. Extinction and Inattention (formerly Neglect): 0-->No abnormality  Total (NIH Stroke Scale): 0       Modified Crawford Score: 0  Dino Coma Scale:    ABCD2 Score:    [id number] Score:   HAS -BLED Score:   ICH Score:0  Hunt & Rucker Classification:Grade I     Hemorrhagic change of an Ischemic Stroke: Does this patient have an ischemic stroke with hemorrhagic changes? No     Neurologic Chief Complaint: Headache, SAH    Subjective:     Interval History: Patient is seen for follow-up neurological assessment and treatment recommendations:     Neuro exam normal. No headache this AM. Patient states headache was severe yesterday afternoon. TCD from today pending.     HPI, Past Medical, Family, and Social History remains the same as documented in the initial encounter.     Review of Systems   Constitutional: Negative for fever.   HENT: Negative for trouble swallowing and voice change.    Eyes: Negative for visual disturbance.   Respiratory: Negative for shortness of breath.    Cardiovascular: Negative for palpitations.   Gastrointestinal: Negative for nausea.    Musculoskeletal: Negative for neck pain and neck stiffness.   Neurological: Negative for dizziness, tremors, seizures, facial asymmetry, speech difficulty, weakness, numbness and headaches.   Psychiatric/Behavioral: Negative for confusion.     Scheduled Meds:   enoxparin  40 mg Subcutaneous Q24H    gabapentin  100 mg Oral TID    gabapentin  200 mg Oral QHS    montelukast  10 mg Oral Daily    niMODipine  60 mg Oral Q4H    pantoprazole  40 mg Oral BID    senna-docusate 8.6-50 mg  1 tablet Oral BID     Continuous Infusions:   lactated ringers 100 mL/hr at 07/12/19 1005     PRN Meds:magnesium oxide, magnesium oxide, ondansetron, potassium chloride 10%, potassium chloride 10%, potassium chloride 10%, potassium, sodium phosphates, potassium, sodium phosphates, potassium, sodium phosphates, ramelteon, simethicone, sodium chloride 0.9%    Objective:     Vital Signs (Most Recent):  Temp: 97.4 °F (36.3 °C) (07/12/19 0705)  Pulse: 79 (07/12/19 1005)  Resp: 11 (07/12/19 1005)  BP: (!) 153/65 (07/12/19 1005)  SpO2: 97 % (07/12/19 1005)  BP Location: Right arm    Vital Signs Range (Last 24H):  Temp:  [97.4 °F (36.3 °C)-99.6 °F (37.6 °C)]   Pulse:  []   Resp:  [11-25]   BP: (139-200)/(63-97)   SpO2:  [93 %-100 %]   BP Location: Right arm    Physical Exam   Neck: Normal range of motion. Neck supple.   Cardiovascular: Normal rate.   Pulmonary/Chest: Effort normal.   Musculoskeletal: Normal range of motion.   Skin: Capillary refill takes less than 2 seconds.   Nursing note and vitals reviewed.      Neurological Exam:   LOC: alert  Attention Span: Good   Language: No aphasia  Articulation: No dysarthria  Orientation: Person, Place, Time   Visual Fields: Full  EOM (CN III, IV, VI): Full/intact  Pupils (CN II, III): PERRL  Facial Sensation (CN V): Normal  Facial Movement (CN VII): Symmetric facial expression    Motor: Arm left  Normal 5/5  Leg left  Normal 5/5  Arm right  Normal 5/5  Leg right Normal 5/5  Cebellar: No  evidence of appendicular or axial ataxia  Sensation: Intact to light touch, temperature and vibration      Diagnostic Results     Brain Imaging      CT head w/o contrast 07/04/2019  1. Subarachnoid hemorrhage.  In the setting of no trauma suspect aneurysm     CT head 7/11  Subarachnoid hemorrhage almost completely resolved.  Interval coiling of a A-comm aneurysm.  No vasospasm related complication seen.  Vessel Imaging      IR angio 07/05/2019  1. 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    2.  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    3.  Distal embolism likely from the access catheter into the distal branches of the anterior middle cerebral artery on the left side treated successfully with intra-arterial Integrilin     CTA head 07/04/2019  Small anterior communicating artery aneurysm likely the origin of the recent subarachnoid bleed.    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 The estimated ejection fraction is 63%  · Concentric left ventricular remodeling.  · No wall motion abnormalities.  · Normal LV diastolic function.  · Normal right ventricular systolic function.  · Mild tricuspid regurgitation.  · Normal central venous pressure (3 mm Hg).  · The estimated PA systolic pressure is 22 mm Hg         Meggan Wilson NP  Comprehensive Stroke Center  Department of Vascular Neurology   [facility]

## 2019-07-12 NOTE — ASSESSMENT & PLAN NOTE
-nontraumatic SAH  -HH2 F2 SAH secondary to ruptured ACOM aneurysm now s/p coiling 7/5  -NSGY following  -daily TCDs  -Nimotop  -SBP<200  -continue IVF  -PT/OT  -neuro checks q 1 hr  -monitor in ICU overnight with possible stepdown tomorrow

## 2019-07-12 NOTE — SUBJECTIVE & OBJECTIVE
Neurologic Chief Complaint: Headache, SAH    Subjective:     Interval History: Patient is seen for follow-up neurological assessment and treatment recommendations:     Neuro exam normal. No headache this AM. Patient states headache was severe yesterday afternoon. TCD from today pending.     HPI, Past Medical, Family, and Social History remains the same as documented in the initial encounter.     Review of Systems   Constitutional: Negative for fever.   HENT: Negative for trouble swallowing and voice change.    Eyes: Negative for visual disturbance.   Respiratory: Negative for shortness of breath.    Cardiovascular: Negative for palpitations.   Gastrointestinal: Negative for nausea.   Musculoskeletal: Negative for neck pain and neck stiffness.   Neurological: Negative for dizziness, tremors, seizures, facial asymmetry, speech difficulty, weakness, numbness and headaches.   Psychiatric/Behavioral: Negative for confusion.     Scheduled Meds:   enoxparin  40 mg Subcutaneous Q24H    gabapentin  100 mg Oral TID    gabapentin  200 mg Oral QHS    montelukast  10 mg Oral Daily    niMODipine  60 mg Oral Q4H    pantoprazole  40 mg Oral BID    senna-docusate 8.6-50 mg  1 tablet Oral BID     Continuous Infusions:   lactated ringers 100 mL/hr at 07/12/19 1005     PRN Meds:magnesium oxide, magnesium oxide, ondansetron, potassium chloride 10%, potassium chloride 10%, potassium chloride 10%, potassium, sodium phosphates, potassium, sodium phosphates, potassium, sodium phosphates, ramelteon, simethicone, sodium chloride 0.9%    Objective:     Vital Signs (Most Recent):  Temp: 97.4 °F (36.3 °C) (07/12/19 0705)  Pulse: 79 (07/12/19 1005)  Resp: 11 (07/12/19 1005)  BP: (!) 153/65 (07/12/19 1005)  SpO2: 97 % (07/12/19 1005)  BP Location: Right arm    Vital Signs Range (Last 24H):  Temp:  [97.4 °F (36.3 °C)-99.6 °F (37.6 °C)]   Pulse:  []   Resp:  [11-25]   BP: (139-200)/(63-97)   SpO2:  [93 %-100 %]   BP Location: Right  arm    Physical Exam   Neck: Normal range of motion. Neck supple.   Cardiovascular: Normal rate.   Pulmonary/Chest: Effort normal.   Musculoskeletal: Normal range of motion.   Skin: Capillary refill takes less than 2 seconds.   Nursing note and vitals reviewed.      Neurological Exam:   LOC: alert  Attention Span: Good   Language: No aphasia  Articulation: No dysarthria  Orientation: Person, Place, Time   Visual Fields: Full  EOM (CN III, IV, VI): Full/intact  Pupils (CN II, III): PERRL  Facial Sensation (CN V): Normal  Facial Movement (CN VII): Symmetric facial expression    Motor: Arm left  Normal 5/5  Leg left  Normal 5/5  Arm right  Normal 5/5  Leg right Normal 5/5  Cebellar: No evidence of appendicular or axial ataxia  Sensation: Intact to light touch, temperature and vibration      Diagnostic Results     Brain Imaging      CT head w/o contrast 07/04/2019  1. Subarachnoid hemorrhage.  In the setting of no trauma suspect aneurysm     CT head 7/11  Subarachnoid hemorrhage almost completely resolved.  Interval coiling of a A-comm aneurysm.  No vasospasm related complication seen.  Vessel Imaging      IR angio 07/05/2019  1. 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    2.  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    3.  Distal embolism likely from the access catheter into the distal branches of the anterior middle cerebral artery on the left side treated successfully with intra-arterial Integrilin     CTA head 07/04/2019  Small anterior communicating artery aneurysm likely the origin of the recent subarachnoid bleed.    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 The estimated ejection fraction is 63%  · Concentric left ventricular remodeling.  · No wall motion abnormalities.  · Normal LV diastolic function.  · Normal right  ventricular systolic function.  · Mild tricuspid regurgitation.  · Normal central venous pressure (3 mm Hg).  · The estimated PA systolic pressure is 22 mm Hg

## 2019-07-12 NOTE — PROGRESS NOTES
[facility]  Neurocritical Care  Progress Note    Admit Date: 7/4/2019  Service Date: 07/12/2019  Length of Stay: 8    Subjective:     Chief Complaint: Nontraumatic subarachnoid hemorrhage from anterior communicating artery    History of Present Illness: 64 y F with medical h.o breast cancer s/p mastectomy, lumpectomy, who presented to the ER as a transfer from [facility] as SAH detected on CTH.  Patient's symptoms started on Tuesday 7/2 when she was undergoing a CT abdomen for evaluation of indigestion. She started to feel a pounding and congestion of head and ears. She did not seek medical attention till today 7/4 when she went to urgent care. Urgent care doc did not feel comfortable treating it as migraine and sent patient to ER; where CT head detected SAH. She is transferred here to [facility].  Here a CTA detected CAROLINA aneurysm and patient is being sent for angio and further neurosurg intervention,.    Hospital Course: 07/05/2019 NAEO. For conventional angiogram today ACOM aneurysm on CTA  7/7/19: add lovenox for DVT proph., initiate IVF, PT/OT, liberalize BP <200  07/08/2019 headache. Mild to moderate cerebral VSP.   07/09/2019 NAEON  7/10/2019: increased night time gabapentin, add senna doc, CT head today (ordered per NSGY)-stable, continue TCDs and nimotop, dexamethasone 4 mg once PRN (for headache).   07/12/2019: severe headache overnight, resolved with steroids        Review of Systems    Constitutional: Denies fevers, weight loss, chills, or weakness.  Eyes: Denies changes in vision.  ENT: Denies dysphagia, nasal discharge, ear pain or discharge.  Cardiovascular: Denies chest pain, palpitations, orthopnea, or claudication.  Respiratory: Denies shortness of breath, cough, hemoptysis, or wheezing.  GI: Denies nausea/vomitting, hematochezia, melena, abd pain, or changes in appetite.  : Denies dysuria, incontinence, or hematuria.  Musculoskeletal: Denies joint pain or  myalgias.  Skin/breast: Denies rashes, lumps, lesions, or discharge.  Neurologic: Denies  dizziness, vertigo, or paresthesias. Complains of headache  Psychiatric: Denies changes in mood or hallucinations.  Endocrine: Denies polyuria, polydipsia, heat/cold intolerance.  Hematologic/Lymph: Denies lymphadenopathy, easy bruising or easy bleeding.  Allergic/Immunologic: Denies rash, rhinitis.   Objective:     Vitals:  Temp: 98 °F (36.7 °C)  Pulse: 92  Rhythm: normal sinus rhythm  BP: (!) 166/77  MAP (mmHg): 110  Resp: (!) 22  SpO2: 99 %  O2 Device (Oxygen Therapy): room air    Temp  Min: 97.4 °F (36.3 °C)  Max: 99.6 °F (37.6 °C)  Pulse  Min: 66  Max: 104  BP  Min: 139/63  Max: 200/84  MAP (mmHg)  Min: 90  Max: 138  Resp  Min: 11  Max: 25  SpO2  Min: 93 %  Max: 100 %    07/11 0701 - 07/12 0700  In: 4100 [P.O.:700; I.V.:2400]  Out: 3300 [Urine:3300]   Unmeasured Output  Urine Occurrence: 1  Stool Occurrence: 0  Emesis Occurrence: 0  Pad Count: 2       Physical Exam    GA: Alert, comfortable, no acute distress.   HEENT: No scleral icterus or JVD.   Pulmonary: Clear to auscultation A/L.   Cardiac: RRR S1 & S2 w/o rubs/murmurs/gallops.   Abdominal: Bowel sounds present x 4. No appreciable hepatosplenomegaly.  Skin: No jaundice, rashes, or visible lesions.  Neuro:  --GCS: E4 V5 M6  --Mental Status: AAOX4, follows all commands, clear speech  --Pupils 3->2mm, PERRL.   --Corneal reflex, gag, cough intact.  --CAMPBELL spont and against gravity, no drift in upper extremities    Medications:  Continuous    lactated ringers Last Rate: 100 mL/hr at 07/12/19 1505   Scheduled    enoxparin 40 mg Q24H   gabapentin 100 mg TID   gabapentin 200 mg QHS   montelukast 10 mg Daily   niMODipine 60 mg Q4H   pantoprazole 40 mg BID   senna-docusate 8.6-50 mg 1 tablet BID   PRN    magnesium oxide 800 mg PRN   magnesium oxide 800 mg PRN   ondansetron 4 mg Q6H PRN   potassium chloride 10% 40 mEq PRN   potassium chloride 10% 40 mEq PRN   potassium chloride  10% 60 mEq PRN   potassium, sodium phosphates 2 packet PRN   potassium, sodium phosphates 2 packet PRN   potassium, sodium phosphates 2 packet PRN   ramelteon 8 mg Nightly PRN   simethicone 1 tablet TID PRN   sodium chloride 0.9% 10 mL PRN     Today I personally reviewed pertinent medications, lines/drains/airways, imaging, laboratory results,     Diet  Diet Adult Regular (IDDSI Level 7)  Diet NPO      Assessment/Plan:     Neuro  * Nontraumatic subarachnoid hemorrhage from anterior communicating artery  -nontraumatic SAH  -HH2 F2 SAH secondary to ruptured ACOM aneurysm now s/p coiling 7/5  -NSGY following  -daily TCDs  -Nimotop  -SBP<200  -continue IVF  -PT/OT  -neuro checks q 1 hr  -monitor in ICU overnight with possible stepdown tomorrow    Cerebral vasospasm  Continue nimotop and TCDs    Aneurysm of anterior communicating artery  S/p coiling      Oncology  Anemia  monitor    GI  Transaminitis  Abdominal u/s pending          The patient is being Prophylaxed for:  Venous Thromboembolism with: Mechanical or Chemical  Stress Ulcer with: PPI  Ventilator Pneumonia with: not applicable    Activity Orders          Diet NPO: NPO starting at 07/13 0001    Diet Adult Regular (IDDSI Level 7): Regular starting at 07/08 1607        Full Code    Frederic Greenberg MD  Neurocritical Care  [facility]        30 minutes of hospital care time was spent personally by me on the following activities: development of treatment plan with patient or surrogate and bedside caregivers, discussions with consultants, evaluation of patient's response to treatment, examination of patient, ordering and performing treatments and interventions, ordering and review of laboratory studies, ordering and review of radiographic studies, pulse oximetry, antibiotic titration if applicable, vasopressor titration if applicable, re-evaluation of patient's condition. This critical care time did not overlap with that of any other provider or  involve time for any procedures.

## 2019-07-12 NOTE — ASSESSMENT & PLAN NOTE
65 yo with hx of breast cancer presents to the ED with complaints of intractable headache after a barium CT abdomen on 7/2. CT head on 7/4 with diffuse SAH within basal cisterns and midline- NSY was consulted. CTA on 7/4 revealed small anterior communicating artery aneurysm-. She was taken to IR on 7/5 and is now s/p coiling. Patient is admitted to [facility] for close monitoring.   Mild left CAROLINA territory vasospasm seen on 7/11 TCD. TCD from today pending.    Antithrombotics: none (SAH)  Statins: none (SAH)  Aggressive risk factor modification:   Rehab efforts: PT/OT/SLP to eval and treat.   Diagnostics ordered/pending: daily TCDs  VTE prophylaxis: lovenox  BP parameters: secured aneurysm- keep SBP <200

## 2019-07-12 NOTE — SUBJECTIVE & OBJECTIVE
Review of Systems    Constitutional: Denies fevers, weight loss, chills, or weakness.  Eyes: Denies changes in vision.  ENT: Denies dysphagia, nasal discharge, ear pain or discharge.  Cardiovascular: Denies chest pain, palpitations, orthopnea, or claudication.  Respiratory: Denies shortness of breath, cough, hemoptysis, or wheezing.  GI: Denies nausea/vomitting, hematochezia, melena, abd pain, or changes in appetite.  : Denies dysuria, incontinence, or hematuria.  Musculoskeletal: Denies joint pain or myalgias.  Skin/breast: Denies rashes, lumps, lesions, or discharge.  Neurologic: Denies  dizziness, vertigo, or paresthesias. Complains of headache  Psychiatric: Denies changes in mood or hallucinations.  Endocrine: Denies polyuria, polydipsia, heat/cold intolerance.  Hematologic/Lymph: Denies lymphadenopathy, easy bruising or easy bleeding.  Allergic/Immunologic: Denies rash, rhinitis.   Objective:     Vitals:  Temp: 98 °F (36.7 °C)  Pulse: 92  Rhythm: normal sinus rhythm  BP: (!) 166/77  MAP (mmHg): 110  Resp: (!) 22  SpO2: 99 %  O2 Device (Oxygen Therapy): room air    Temp  Min: 97.4 °F (36.3 °C)  Max: 99.6 °F (37.6 °C)  Pulse  Min: 66  Max: 104  BP  Min: 139/63  Max: 200/84  MAP (mmHg)  Min: 90  Max: 138  Resp  Min: 11  Max: 25  SpO2  Min: 93 %  Max: 100 %    07/11 0701 - 07/12 0700  In: 4100 [P.O.:700; I.V.:2400]  Out: 3300 [Urine:3300]   Unmeasured Output  Urine Occurrence: 1  Stool Occurrence: 0  Emesis Occurrence: 0  Pad Count: 2       Physical Exam    GA: Alert, comfortable, no acute distress.   HEENT: No scleral icterus or JVD.   Pulmonary: Clear to auscultation A/L.   Cardiac: RRR S1 & S2 w/o rubs/murmurs/gallops.   Abdominal: Bowel sounds present x 4. No appreciable hepatosplenomegaly.  Skin: No jaundice, rashes, or visible lesions.  Neuro:  --GCS: E4 V5 M6  --Mental Status: AAOX4, follows all commands, clear speech  --Pupils 3->2mm, PERRL.   --Corneal reflex, gag, cough intact.  --CAMPBELL spont and  against gravity, no drift in upper extremities    Medications:  Continuous    lactated ringers Last Rate: 100 mL/hr at 07/12/19 1505   Scheduled    enoxparin 40 mg Q24H   gabapentin 100 mg TID   gabapentin 200 mg QHS   montelukast 10 mg Daily   niMODipine 60 mg Q4H   pantoprazole 40 mg BID   senna-docusate 8.6-50 mg 1 tablet BID   PRN    magnesium oxide 800 mg PRN   magnesium oxide 800 mg PRN   ondansetron 4 mg Q6H PRN   potassium chloride 10% 40 mEq PRN   potassium chloride 10% 40 mEq PRN   potassium chloride 10% 60 mEq PRN   potassium, sodium phosphates 2 packet PRN   potassium, sodium phosphates 2 packet PRN   potassium, sodium phosphates 2 packet PRN   ramelteon 8 mg Nightly PRN   simethicone 1 tablet TID PRN   sodium chloride 0.9% 10 mL PRN     Today I personally reviewed pertinent medications, lines/drains/airways, imaging, laboratory results,     Diet  Diet Adult Regular (IDDSI Level 7)  Diet NPO

## 2019-07-12 NOTE — NURSING TRANSFER
Nursing Transfer Note      7/12/2019     Transfer To: [id number]    Transfer via wheelchair    Transfer with cardiac monitoring    Transported by RN    Medicines sent: NA    Chart send with patient: Yes    Notified: spouse    Patient reassessed at: 7/12/19 1800 (date, time)    Upon arrival to floor: cardiac monitor applied, patient oriented to room, call bell in reach and bed in lowest position

## 2019-07-12 NOTE — PLAN OF CARE
Problem: Adult Inpatient Plan of Care  Goal: Plan of Care Review  Outcome: Ongoing (interventions implemented as appropriate)    POC reviewed with pt at 0330. Pt verbalized understanding. Questions and concerns addressed. No acute events overnight. LR gtt @ 100. Pt progressing toward goals. Will continue to monitor. See flowsheets for full assessment and VS info.

## 2019-07-12 NOTE — SUBJECTIVE & OBJECTIVE
Interval History: naeon    Medications:  Continuous Infusions:   lactated ringers 100 mL/hr at 07/12/19 1005     Scheduled Meds:   enoxparin  40 mg Subcutaneous Q24H    gabapentin  100 mg Oral TID    gabapentin  200 mg Oral QHS    montelukast  10 mg Oral Daily    niMODipine  60 mg Oral Q4H    pantoprazole  40 mg Oral BID    senna-docusate 8.6-50 mg  1 tablet Oral BID     PRN Meds:magnesium oxide, magnesium oxide, ondansetron, potassium chloride 10%, potassium chloride 10%, potassium chloride 10%, potassium, sodium phosphates, potassium, sodium phosphates, potassium, sodium phosphates, ramelteon, simethicone, sodium chloride 0.9%     Review of Systems  Objective:     Weight: 69.9 kg (154 lb)  Body mass index is 25.63 kg/m².  Vital Signs (Most Recent):  Temp: 97.4 °F (36.3 °C) (07/12/19 0705)  Pulse: 79 (07/12/19 1005)  Resp: 11 (07/12/19 1005)  BP: (!) 153/65 (07/12/19 1005)  SpO2: 97 % (07/12/19 1005) Vital Signs (24h Range):  Temp:  [97.4 °F (36.3 °C)-99.6 °F (37.6 °C)] 97.4 °F (36.3 °C)  Pulse:  [] 79  Resp:  [11-25] 11  SpO2:  [93 %-100 %] 97 %  BP: (139-200)/(63-97) 153/65     Date 07/12/19 0700 - 07/13/19 0659   Shift 7131-9604 8509-5294 9850-5752 24 Hour Total   INTAKE   P.O. 200   200   I.V.(mL/kg) 406.7(5.8)   406.7(5.8)   Shift Total(mL/kg) 606.7(8.7)   606.7(8.7)   OUTPUT   Urine(mL/kg/hr) 500   500   Shift Total(mL/kg) 500(7.2)   500(7.2)   Weight (kg) 69.9 69.9 69.9 69.9                   Female External Urinary Catheter 07/04/19 1806 (Active)   Skin no redness;no breakdown 7/12/2019  7:05 AM   Tolerance no signs/symptoms of discomfort 7/12/2019  7:05 AM   Suction Continuous suction at 40 mmHg 7/12/2019  7:05 AM   Date of last wick change 07/12/19 7/12/2019  7:05 AM   Time of last wick change 0905 7/12/2019  7:05 AM   Output (mL) 500 mL 7/12/2019  8:05 AM       Neurosurgery Physical Exam   AOX3, speech fluent  PERRL, EOMI, face symm, tongue midline  CAMPBELL symm  SILT  No drift    Significant  Labs:  Recent Labs   Lab 07/11/19 0137 07/12/19  0112   * 107    139   K 4.1 4.4    104   CO2 26 24   BUN 16 12   CREATININE 0.7 0.7   CALCIUM 9.3 9.2   MG 2.1 2.0     Recent Labs   Lab 07/11/19 0137 07/12/19 0112   WBC 10.09 11.88   HGB 10.6* 10.8*   HCT 33.9* 35.9*    283     No results for input(s): LABPT, INR, APTT in the last 48 hours.  Microbiology Results (last 7 days)     ** No results found for the last 168 hours. **            Significant Diagnostics:  CT head: stable from prior

## 2019-07-13 LAB
ALBUMIN SERPL BCP-MCNC: 3.2 G/DL (ref 3.5–5.2)
ALP SERPL-CCNC: 152 U/L (ref 55–135)
ALT SERPL W/O P-5'-P-CCNC: 73 U/L (ref 10–44)
ANION GAP SERPL CALC-SCNC: 10 MMOL/L (ref 8–16)
AST SERPL-CCNC: 39 U/L (ref 10–40)
BASOPHILS # BLD AUTO: 0.06 K/UL (ref 0–0.2)
BASOPHILS NFR BLD: 0.7 % (ref 0–1.9)
BILIRUB SERPL-MCNC: 0.4 MG/DL (ref 0.1–1)
BUN SERPL-MCNC: 13 MG/DL (ref 8–23)
CALCIUM SERPL-MCNC: 9.1 MG/DL (ref 8.7–10.5)
CHLORIDE SERPL-SCNC: 103 MMOL/L (ref 95–110)
CO2 SERPL-SCNC: 26 MMOL/L (ref 23–29)
CREAT SERPL-MCNC: 0.7 MG/DL (ref 0.5–1.4)
DIFFERENTIAL METHOD: ABNORMAL
EOSINOPHIL # BLD AUTO: 0.2 K/UL (ref 0–0.5)
EOSINOPHIL NFR BLD: 1.8 % (ref 0–8)
ERYTHROCYTE [DISTWIDTH] IN BLOOD BY AUTOMATED COUNT: 13.5 % (ref 11.5–14.5)
EST. GFR  (AFRICAN AMERICAN): >60 ML/MIN/1.73 M^2
EST. GFR  (NON AFRICAN AMERICAN): >60 ML/MIN/1.73 M^2
GLUCOSE SERPL-MCNC: 105 MG/DL (ref 70–110)
HCT VFR BLD AUTO: 38.1 % (ref 37–48.5)
HGB BLD-MCNC: 11 G/DL (ref 12–16)
IMM GRANULOCYTES # BLD AUTO: 0.12 K/UL (ref 0–0.04)
IMM GRANULOCYTES NFR BLD AUTO: 1.3 % (ref 0–0.5)
LYMPHOCYTES # BLD AUTO: 2.4 K/UL (ref 1–4.8)
LYMPHOCYTES NFR BLD: 26.3 % (ref 18–48)
MAGNESIUM SERPL-MCNC: 2.1 MG/DL (ref 1.6–2.6)
MCH RBC QN AUTO: 27.4 PG (ref 27–31)
MCHC RBC AUTO-ENTMCNC: 28.9 G/DL (ref 32–36)
MCV RBC AUTO: 95 FL (ref 82–98)
MONOCYTES # BLD AUTO: 1 K/UL (ref 0.3–1)
MONOCYTES NFR BLD: 10.7 % (ref 4–15)
NEUTROPHILS # BLD AUTO: 5.3 K/UL (ref 1.8–7.7)
NEUTROPHILS NFR BLD: 59.2 % (ref 38–73)
NRBC BLD-RTO: 0 /100 WBC
PHOSPHATE SERPL-MCNC: 3.8 MG/DL (ref 2.7–4.5)
PLATELET # BLD AUTO: 254 K/UL (ref 150–350)
PMV BLD AUTO: 9.6 FL (ref 9.2–12.9)
POTASSIUM SERPL-SCNC: 3.9 MMOL/L (ref 3.5–5.1)
PROT SERPL-MCNC: 6.3 G/DL (ref 6–8.4)
RBC # BLD AUTO: 4.02 M/UL (ref 4–5.4)
SODIUM SERPL-SCNC: 139 MMOL/L (ref 136–145)
WBC # BLD AUTO: 8.94 K/UL (ref 3.9–12.7)

## 2019-07-13 PROCEDURE — 25000003 PHARM REV CODE 250: Performed by: PSYCHIATRY & NEUROLOGY

## 2019-07-13 PROCEDURE — 63600175 PHARM REV CODE 636 W HCPCS: Performed by: STUDENT IN AN ORGANIZED HEALTH CARE EDUCATION/TRAINING PROGRAM

## 2019-07-13 PROCEDURE — 83735 ASSAY OF MAGNESIUM: CPT

## 2019-07-13 PROCEDURE — 25000003 PHARM REV CODE 250: Performed by: UROLOGY

## 2019-07-13 PROCEDURE — 80053 COMPREHEN METABOLIC PANEL: CPT

## 2019-07-13 PROCEDURE — 25000003 PHARM REV CODE 250: Performed by: NURSE PRACTITIONER

## 2019-07-13 PROCEDURE — 20600001 HC STEP DOWN PRIVATE ROOM

## 2019-07-13 PROCEDURE — 99232 PR SUBSEQUENT HOSPITAL CARE,LEVL II: ICD-10-PCS | Mod: ,,, | Performed by: NEUROLOGICAL SURGERY

## 2019-07-13 PROCEDURE — 25000003 PHARM REV CODE 250: Performed by: STUDENT IN AN ORGANIZED HEALTH CARE EDUCATION/TRAINING PROGRAM

## 2019-07-13 PROCEDURE — 99232 SBSQ HOSP IP/OBS MODERATE 35: CPT | Mod: ,,, | Performed by: NEUROLOGICAL SURGERY

## 2019-07-13 PROCEDURE — 63600175 PHARM REV CODE 636 W HCPCS: Performed by: PSYCHIATRY & NEUROLOGY

## 2019-07-13 PROCEDURE — 84100 ASSAY OF PHOSPHORUS: CPT

## 2019-07-13 PROCEDURE — 85025 COMPLETE CBC W/AUTO DIFF WBC: CPT

## 2019-07-13 PROCEDURE — 63600175 PHARM REV CODE 636 W HCPCS: Performed by: NURSE PRACTITIONER

## 2019-07-13 PROCEDURE — 25000003 PHARM REV CODE 250: Performed by: NEUROLOGICAL SURGERY

## 2019-07-13 PROCEDURE — 36415 COLL VENOUS BLD VENIPUNCTURE: CPT

## 2019-07-13 RX ORDER — HYDROMORPHONE HYDROCHLORIDE 1 MG/ML
0.5 INJECTION, SOLUTION INTRAMUSCULAR; INTRAVENOUS; SUBCUTANEOUS ONCE
Status: DISCONTINUED | OUTPATIENT
Start: 2019-07-13 | End: 2019-07-13

## 2019-07-13 RX ORDER — ACETAMINOPHEN 325 MG/1
650 TABLET ORAL ONCE
Status: DISCONTINUED | OUTPATIENT
Start: 2019-07-13 | End: 2019-07-13

## 2019-07-13 RX ORDER — DIAZEPAM 5 MG/1
5 TABLET ORAL EVERY 6 HOURS PRN
Status: DISCONTINUED | OUTPATIENT
Start: 2019-07-13 | End: 2019-07-15

## 2019-07-13 RX ORDER — OXYCODONE AND ACETAMINOPHEN 5; 325 MG/1; MG/1
1 TABLET ORAL EVERY 4 HOURS PRN
Status: DISCONTINUED | OUTPATIENT
Start: 2019-07-13 | End: 2019-07-13

## 2019-07-13 RX ORDER — BUTALBITAL, ACETAMINOPHEN AND CAFFEINE 50; 325; 40 MG/1; MG/1; MG/1
1 TABLET ORAL ONCE
Status: COMPLETED | OUTPATIENT
Start: 2019-07-13 | End: 2019-07-13

## 2019-07-13 RX ORDER — ACETAMINOPHEN 325 MG/1
650 TABLET ORAL ONCE
Status: COMPLETED | OUTPATIENT
Start: 2019-07-13 | End: 2019-07-13

## 2019-07-13 RX ORDER — BUTALBITAL, ACETAMINOPHEN AND CAFFEINE 50; 325; 40 MG/1; MG/1; MG/1
1 TABLET ORAL EVERY 6 HOURS PRN
Status: DISCONTINUED | OUTPATIENT
Start: 2019-07-13 | End: 2019-07-15

## 2019-07-13 RX ORDER — HYDRALAZINE HYDROCHLORIDE 20 MG/ML
10 INJECTION INTRAMUSCULAR; INTRAVENOUS ONCE
Status: COMPLETED | OUTPATIENT
Start: 2019-07-13 | End: 2019-07-13

## 2019-07-13 RX ADMIN — NIMODIPINE 60 MG: 30 CAPSULE, LIQUID FILLED ORAL at 02:07

## 2019-07-13 RX ADMIN — PANTOPRAZOLE SODIUM 40 MG: 40 TABLET, DELAYED RELEASE ORAL at 11:07

## 2019-07-13 RX ADMIN — ENOXAPARIN SODIUM 40 MG: 100 INJECTION SUBCUTANEOUS at 05:07

## 2019-07-13 RX ADMIN — SENNOSIDES,DOCUSATE SODIUM 1 TABLET: 8.6; 5 TABLET, FILM COATED ORAL at 09:07

## 2019-07-13 RX ADMIN — BUTALBITAL, ACETAMINOPHEN AND CAFFEINE 1 TABLET: 50; 325; 40 TABLET ORAL at 01:07

## 2019-07-13 RX ADMIN — DIAZEPAM 5 MG: 5 TABLET ORAL at 05:07

## 2019-07-13 RX ADMIN — SODIUM CHLORIDE, SODIUM LACTATE, POTASSIUM CHLORIDE, AND CALCIUM CHLORIDE: 600; 310; 30; 20 INJECTION, SOLUTION INTRAVENOUS at 06:07

## 2019-07-13 RX ADMIN — GABAPENTIN 100 MG: 100 CAPSULE ORAL at 11:07

## 2019-07-13 RX ADMIN — SENNOSIDES,DOCUSATE SODIUM 1 TABLET: 8.6; 5 TABLET, FILM COATED ORAL at 11:07

## 2019-07-13 RX ADMIN — BUTALBITAL, ACETAMINOPHEN AND CAFFEINE 1 TABLET: 50; 325; 40 TABLET ORAL at 09:07

## 2019-07-13 RX ADMIN — GABAPENTIN 200 MG: 100 CAPSULE ORAL at 09:07

## 2019-07-13 RX ADMIN — GABAPENTIN 100 MG: 100 CAPSULE ORAL at 05:07

## 2019-07-13 RX ADMIN — HYDRALAZINE HYDROCHLORIDE 10 MG: 20 INJECTION INTRAMUSCULAR; INTRAVENOUS at 06:07

## 2019-07-13 RX ADMIN — GABAPENTIN 100 MG: 100 CAPSULE ORAL at 09:07

## 2019-07-13 RX ADMIN — ONDANSETRON 4 MG: 2 INJECTION INTRAMUSCULAR; INTRAVENOUS at 12:07

## 2019-07-13 RX ADMIN — NIMODIPINE 60 MG: 30 CAPSULE, LIQUID FILLED ORAL at 09:07

## 2019-07-13 RX ADMIN — NIMODIPINE 60 MG: 30 CAPSULE, LIQUID FILLED ORAL at 06:07

## 2019-07-13 RX ADMIN — BUTALBITAL, ACETAMINOPHEN AND CAFFEINE 1 TABLET: 50; 325; 40 TABLET ORAL at 06:07

## 2019-07-13 RX ADMIN — NIMODIPINE 60 MG: 30 CAPSULE, LIQUID FILLED ORAL at 05:07

## 2019-07-13 RX ADMIN — MONTELUKAST SODIUM 10 MG: 10 TABLET, COATED ORAL at 11:07

## 2019-07-13 RX ADMIN — PANTOPRAZOLE SODIUM 40 MG: 40 TABLET, DELAYED RELEASE ORAL at 09:07

## 2019-07-13 RX ADMIN — NIMODIPINE 60 MG: 30 CAPSULE, LIQUID FILLED ORAL at 11:07

## 2019-07-13 NOTE — PROGRESS NOTES
Pt was sitting upright on couch with  on bedside. While nurse was present, pt experienced 10/10 low back spasms that were intermittent, but severe. Assisted pt back to bed with heavy assist X 2. Pt barely able to bear weight on feet during spasm. Spoke with NS resident by phone. Order noted for Valium 5 mg PO.

## 2019-07-13 NOTE — PROGRESS NOTES
[facility]  Neurosurgery  Progress Note    Subjective:     History of Present Illness: 63 yo with hx of breast cancer presents to the ED with complaints of headache after a barium CT abdomen on Tuesday. Reports intractable headache which began 2 days ago. Associated n/v, neck pain. Denies visual changes, weakness, paresthesias, b/b dysfunction. No trauma, falls, or LOC. She does not take antiplatelet therapy or anticoagulants. Currently on a cardene drip started at the outside hospital. She states she is tired from the morphine given prior to transport. Family at bedside.       Post-Op Info:  Procedure(s) (LRB):  ANGIOGRAM-CEREBRAL, acomm aneurysm (N/A)   8 Days Post-Op     Interval History: C/o headache this AM, HTN 2/2 pain. Neurologically stable. Net positive. TCDs pending.     Medications:  Continuous Infusions:   lactated ringers 100 mL/hr at 07/13/19 0613     Scheduled Meds:   enoxparin  40 mg Subcutaneous Q24H    gabapentin  100 mg Oral TID    gabapentin  200 mg Oral QHS    montelukast  10 mg Oral Daily    niMODipine  60 mg Oral Q4H    pantoprazole  40 mg Oral BID    senna-docusate 8.6-50 mg  1 tablet Oral BID     PRN Meds:magnesium oxide, magnesium oxide, ondansetron, potassium chloride 10%, potassium chloride 10%, potassium chloride 10%, potassium, sodium phosphates, potassium, sodium phosphates, potassium, sodium phosphates, ramelteon, simethicone, sodium chloride 0.9%     Review of Systems  Objective:     Weight: 69.9 kg (154 lb)  Body mass index is 25.63 kg/m².  Vital Signs (Most Recent):  Temp: 98.8 °F (37.1 °C) (07/13/19 0540)  Pulse: 91 (07/13/19 0743)  Resp: 18 (07/13/19 0540)  BP: (!) 193/88 (07/13/19 0540)  SpO2: (!) 93 % (07/13/19 0540) Vital Signs (24h Range):  Temp:  [98.1 °F (36.7 °C)-98.9 °F (37.2 °C)] 98.8 °F (37.1 °C)  Pulse:  [] 91  Resp:  [11-22] 18  SpO2:  [93 %-99 %] 93 %  BP: (134-193)/(68-89) 193/88                          Neurosurgery Physical  Exam  AOX3, speech fluent  PERRL, EOMI, face symm, tongue midline  CAMPBELL symm  SILT  No drift    Significant Labs:  Recent Labs   Lab 07/12/19  0112 07/13/19  0351    105    139   K 4.4 3.9    103   CO2 24 26   BUN 12 13   CREATININE 0.7 0.7   CALCIUM 9.2 9.1   MG 2.0 2.1     Recent Labs   Lab 07/12/19  0112 07/13/19  0351   WBC 11.88 8.94   HGB 10.8* 11.0*   HCT 35.9* 38.1    254       Significant Diagnostics:  TCDs pending    Assessment/Plan:     Aneurysm of anterior communicating artery  64 F who presented with severe HA, neck pain, vomiting and found to have HH2 F2 SAH secondary to ruptured ACOM aneurysm now s/p coiling 7/5:    PBD 10 PCD8    Neuro:  --Neurostepdown      -Q2h neurochecks while in [place]  --HOB@30  --Keppra 500 BID x7d (complete)  --TCDs daily x14d (AM pending)  --Nimotop 60q4 x 21d  --Pain control - HA relieved by steroid IV in past; will trial Fioricet  --Continue to follow clinically and notify NSGY immediately if any neurostatus change    CVS:  ---220 mmHg (cardene ggt; hydralazine & labetalol PRN)  --Echo normal    Pulm:  --on RA    GIT/Electrolytes:  --CMP daily      -Replete electrolytes PRN  --Na goal >135    Renal:  --Strict I/Os  --Goal of euvolemia or net +1L  - MIVF    Heme/ID:  --Daily CBC      -Transfuse PRN  --Trend WBC and T    PPx:  --TEDs/SCDs  --Lovenox  --PPI    Dispo: Continue stepdown unit        Lizbeth Packer MD  Neurosurgery  [facility]

## 2019-07-13 NOTE — PLAN OF CARE
Problem: Adult Inpatient Plan of Care  Goal: Plan of Care Review  Outcome: Ongoing (interventions implemented as appropriate)  Pt AAOx4.  POC and meds reviewed with patient and spouse.  Questions encouraged and answered.  Stroke education reviewed and booklet at bedside.  Both verbalized understanding.  V/S stable throughout shift; please see flowsheet for details and full assessments.  NAEO.  Siderails up x 2, bed locked and in low position.  Call bell in reach.  02 and suction at bedside.  Will CTM.

## 2019-07-13 NOTE — SUBJECTIVE & OBJECTIVE
Interval History: C/o headache this AM, HTN 2/2 pain. Neurologically stable. Net positive. TCDs pending.     Medications:  Continuous Infusions:   lactated ringers 100 mL/hr at 07/13/19 0613     Scheduled Meds:   enoxparin  40 mg Subcutaneous Q24H    gabapentin  100 mg Oral TID    gabapentin  200 mg Oral QHS    montelukast  10 mg Oral Daily    niMODipine  60 mg Oral Q4H    pantoprazole  40 mg Oral BID    senna-docusate 8.6-50 mg  1 tablet Oral BID     PRN Meds:magnesium oxide, magnesium oxide, ondansetron, potassium chloride 10%, potassium chloride 10%, potassium chloride 10%, potassium, sodium phosphates, potassium, sodium phosphates, potassium, sodium phosphates, ramelteon, simethicone, sodium chloride 0.9%     Review of Systems  Objective:     Weight: 69.9 kg (154 lb)  Body mass index is 25.63 kg/m².  Vital Signs (Most Recent):  Temp: 98.8 °F (37.1 °C) (07/13/19 0540)  Pulse: 91 (07/13/19 0743)  Resp: 18 (07/13/19 0540)  BP: (!) 193/88 (07/13/19 0540)  SpO2: (!) 93 % (07/13/19 0540) Vital Signs (24h Range):  Temp:  [98.1 °F (36.7 °C)-98.9 °F (37.2 °C)] 98.8 °F (37.1 °C)  Pulse:  [] 91  Resp:  [11-22] 18  SpO2:  [93 %-99 %] 93 %  BP: (134-193)/(68-89) 193/88                          Neurosurgery Physical Exam  AOX3, speech fluent  PERRL, EOMI, face symm, tongue midline  CAMPBELL symm  SILT  No drift    Significant Labs:  Recent Labs   Lab 07/12/19 0112 07/13/19 0351    105    139   K 4.4 3.9    103   CO2 24 26   BUN 12 13   CREATININE 0.7 0.7   CALCIUM 9.2 9.1   MG 2.0 2.1     Recent Labs   Lab 07/12/19 0112 07/13/19 0351   WBC 11.88 8.94   HGB 10.8* 11.0*   HCT 35.9* 38.1    254       Significant Diagnostics:  TCDs pending

## 2019-07-13 NOTE — PLAN OF CARE
Patient's chart was reviewed by a stroke team provider.  Patient non neurologic issues with greater urgency.  There is no new imaging to review.  Pending diagnostics to follow up on include: TCD.  For other recommendations please see our previous note completed on: 7/12/2019.  Had HA last night, improved after one dose steroid. Neuro exam stable.     There are no new recommendations at this time. Will continue to follow. Discussed patient with staff. Please contact stroke team for any questions or concerns.     Gianna Rice MD  Department of Vascular Neurology

## 2019-07-14 LAB
ALBUMIN SERPL BCP-MCNC: 3.3 G/DL (ref 3.5–5.2)
ALP SERPL-CCNC: 156 U/L (ref 55–135)
ALT SERPL W/O P-5'-P-CCNC: 60 U/L (ref 10–44)
ANION GAP SERPL CALC-SCNC: 14 MMOL/L (ref 8–16)
AST SERPL-CCNC: 26 U/L (ref 10–40)
BASOPHILS # BLD AUTO: 0.05 K/UL (ref 0–0.2)
BASOPHILS # BLD AUTO: 0.06 K/UL (ref 0–0.2)
BASOPHILS NFR BLD: 0.4 % (ref 0–1.9)
BASOPHILS NFR BLD: 0.5 % (ref 0–1.9)
BILIRUB SERPL-MCNC: 0.5 MG/DL (ref 0.1–1)
BUN SERPL-MCNC: 11 MG/DL (ref 8–23)
CALCIUM SERPL-MCNC: 9.3 MG/DL (ref 8.7–10.5)
CHLORIDE SERPL-SCNC: 101 MMOL/L (ref 95–110)
CO2 SERPL-SCNC: 22 MMOL/L (ref 23–29)
CREAT SERPL-MCNC: 0.7 MG/DL (ref 0.5–1.4)
DIFFERENTIAL METHOD: ABNORMAL
DIFFERENTIAL METHOD: ABNORMAL
EOSINOPHIL # BLD AUTO: 0 K/UL (ref 0–0.5)
EOSINOPHIL # BLD AUTO: 0.1 K/UL (ref 0–0.5)
EOSINOPHIL NFR BLD: 0.3 % (ref 0–8)
EOSINOPHIL NFR BLD: 0.7 % (ref 0–8)
ERYTHROCYTE [DISTWIDTH] IN BLOOD BY AUTOMATED COUNT: 13.4 % (ref 11.5–14.5)
ERYTHROCYTE [DISTWIDTH] IN BLOOD BY AUTOMATED COUNT: 13.6 % (ref 11.5–14.5)
EST. GFR  (AFRICAN AMERICAN): >60 ML/MIN/1.73 M^2
EST. GFR  (NON AFRICAN AMERICAN): >60 ML/MIN/1.73 M^2
GLUCOSE SERPL-MCNC: 105 MG/DL (ref 70–110)
HCT VFR BLD AUTO: 35.4 % (ref 37–48.5)
HCT VFR BLD AUTO: 37.4 % (ref 37–48.5)
HGB BLD-MCNC: 11 G/DL (ref 12–16)
HGB BLD-MCNC: 11.6 G/DL (ref 12–16)
IMM GRANULOCYTES # BLD AUTO: 0.3 K/UL (ref 0–0.04)
IMM GRANULOCYTES # BLD AUTO: 0.3 K/UL (ref 0–0.04)
IMM GRANULOCYTES NFR BLD AUTO: 2.4 % (ref 0–0.5)
IMM GRANULOCYTES NFR BLD AUTO: 2.5 % (ref 0–0.5)
LYMPHOCYTES # BLD AUTO: 2 K/UL (ref 1–4.8)
LYMPHOCYTES # BLD AUTO: 2.2 K/UL (ref 1–4.8)
LYMPHOCYTES NFR BLD: 16 % (ref 18–48)
LYMPHOCYTES NFR BLD: 17.7 % (ref 18–48)
MAGNESIUM SERPL-MCNC: 2.2 MG/DL (ref 1.6–2.6)
MCH RBC QN AUTO: 27.4 PG (ref 27–31)
MCH RBC QN AUTO: 27.5 PG (ref 27–31)
MCHC RBC AUTO-ENTMCNC: 31 G/DL (ref 32–36)
MCHC RBC AUTO-ENTMCNC: 31.1 G/DL (ref 32–36)
MCV RBC AUTO: 88 FL (ref 82–98)
MCV RBC AUTO: 89 FL (ref 82–98)
MONOCYTES # BLD AUTO: 1.2 K/UL (ref 0.3–1)
MONOCYTES # BLD AUTO: 1.3 K/UL (ref 0.3–1)
MONOCYTES NFR BLD: 9.6 % (ref 4–15)
MONOCYTES NFR BLD: 9.9 % (ref 4–15)
NEUTROPHILS # BLD AUTO: 8.5 K/UL (ref 1.8–7.7)
NEUTROPHILS # BLD AUTO: 9 K/UL (ref 1.8–7.7)
NEUTROPHILS NFR BLD: 69 % (ref 38–73)
NEUTROPHILS NFR BLD: 71 % (ref 38–73)
NRBC BLD-RTO: 0 /100 WBC
NRBC BLD-RTO: 0 /100 WBC
PHOSPHATE SERPL-MCNC: 3.9 MG/DL (ref 2.7–4.5)
PLATELET # BLD AUTO: 299 K/UL (ref 150–350)
PLATELET # BLD AUTO: 329 K/UL (ref 150–350)
PMV BLD AUTO: 9.2 FL (ref 9.2–12.9)
PMV BLD AUTO: 9.4 FL (ref 9.2–12.9)
POTASSIUM SERPL-SCNC: 3.9 MMOL/L (ref 3.5–5.1)
PROT SERPL-MCNC: 6.5 G/DL (ref 6–8.4)
RBC # BLD AUTO: 4.02 M/UL (ref 4–5.4)
RBC # BLD AUTO: 4.22 M/UL (ref 4–5.4)
SODIUM SERPL-SCNC: 137 MMOL/L (ref 136–145)
WBC # BLD AUTO: 12.23 K/UL (ref 3.9–12.7)
WBC # BLD AUTO: 12.67 K/UL (ref 3.9–12.7)

## 2019-07-14 PROCEDURE — 25000003 PHARM REV CODE 250: Performed by: PSYCHIATRY & NEUROLOGY

## 2019-07-14 PROCEDURE — 25000003 PHARM REV CODE 250: Performed by: NURSE PRACTITIONER

## 2019-07-14 PROCEDURE — 99232 PR SUBSEQUENT HOSPITAL CARE,LEVL II: ICD-10-PCS | Mod: ,,, | Performed by: NEUROLOGICAL SURGERY

## 2019-07-14 PROCEDURE — 94761 N-INVAS EAR/PLS OXIMETRY MLT: CPT

## 2019-07-14 PROCEDURE — 63600175 PHARM REV CODE 636 W HCPCS: Performed by: NURSE PRACTITIONER

## 2019-07-14 PROCEDURE — 25500020 PHARM REV CODE 255: Performed by: NEUROLOGICAL SURGERY

## 2019-07-14 PROCEDURE — 83735 ASSAY OF MAGNESIUM: CPT

## 2019-07-14 PROCEDURE — 25000003 PHARM REV CODE 250: Performed by: STUDENT IN AN ORGANIZED HEALTH CARE EDUCATION/TRAINING PROGRAM

## 2019-07-14 PROCEDURE — 25000003 PHARM REV CODE 250

## 2019-07-14 PROCEDURE — 85025 COMPLETE CBC W/AUTO DIFF WBC: CPT

## 2019-07-14 PROCEDURE — 63600175 PHARM REV CODE 636 W HCPCS: Performed by: PSYCHIATRY & NEUROLOGY

## 2019-07-14 PROCEDURE — 99232 SBSQ HOSP IP/OBS MODERATE 35: CPT | Mod: ,,, | Performed by: NEUROLOGICAL SURGERY

## 2019-07-14 PROCEDURE — 20000000 HC ICU ROOM

## 2019-07-14 PROCEDURE — 99291 CRITICAL CARE FIRST HOUR: CPT | Mod: ,,, | Performed by: ANESTHESIOLOGY

## 2019-07-14 PROCEDURE — 25000003 PHARM REV CODE 250: Performed by: UROLOGY

## 2019-07-14 PROCEDURE — 80053 COMPREHEN METABOLIC PANEL: CPT

## 2019-07-14 PROCEDURE — 99291 PR CRITICAL CARE, E/M 30-74 MINUTES: ICD-10-PCS | Mod: ,,, | Performed by: ANESTHESIOLOGY

## 2019-07-14 PROCEDURE — 36415 COLL VENOUS BLD VENIPUNCTURE: CPT

## 2019-07-14 PROCEDURE — 84100 ASSAY OF PHOSPHORUS: CPT

## 2019-07-14 RX ORDER — ACETAMINOPHEN 325 MG/1
TABLET ORAL
Status: COMPLETED
Start: 2019-07-14 | End: 2019-07-14

## 2019-07-14 RX ORDER — ACETAMINOPHEN 325 MG/1
650 TABLET ORAL ONCE
Status: COMPLETED | OUTPATIENT
Start: 2019-07-14 | End: 2019-07-14

## 2019-07-14 RX ADMIN — ACETAMINOPHEN 650 MG: 325 TABLET ORAL at 06:07

## 2019-07-14 RX ADMIN — GABAPENTIN 200 MG: 100 CAPSULE ORAL at 09:07

## 2019-07-14 RX ADMIN — NIMODIPINE 60 MG: 30 CAPSULE, LIQUID FILLED ORAL at 10:07

## 2019-07-14 RX ADMIN — NIMODIPINE 60 MG: 30 CAPSULE, LIQUID FILLED ORAL at 05:07

## 2019-07-14 RX ADMIN — PANTOPRAZOLE SODIUM 40 MG: 40 TABLET, DELAYED RELEASE ORAL at 09:07

## 2019-07-14 RX ADMIN — PANTOPRAZOLE SODIUM 40 MG: 40 TABLET, DELAYED RELEASE ORAL at 10:07

## 2019-07-14 RX ADMIN — MONTELUKAST SODIUM 10 MG: 10 TABLET, COATED ORAL at 10:07

## 2019-07-14 RX ADMIN — BUTALBITAL, ACETAMINOPHEN AND CAFFEINE 1 TABLET: 50; 325; 40 TABLET ORAL at 10:07

## 2019-07-14 RX ADMIN — GABAPENTIN 100 MG: 100 CAPSULE ORAL at 05:07

## 2019-07-14 RX ADMIN — SODIUM CHLORIDE, SODIUM LACTATE, POTASSIUM CHLORIDE, AND CALCIUM CHLORIDE: 600; 310; 30; 20 INJECTION, SOLUTION INTRAVENOUS at 06:07

## 2019-07-14 RX ADMIN — NIMODIPINE 60 MG: 30 CAPSULE, LIQUID FILLED ORAL at 09:07

## 2019-07-14 RX ADMIN — SODIUM CHLORIDE 1000 ML: 0.9 INJECTION, SOLUTION INTRAVENOUS at 06:07

## 2019-07-14 RX ADMIN — SODIUM CHLORIDE, SODIUM LACTATE, POTASSIUM CHLORIDE, AND CALCIUM CHLORIDE 125 ML/HR: 600; 310; 30; 20 INJECTION, SOLUTION INTRAVENOUS at 06:07

## 2019-07-14 RX ADMIN — SENNOSIDES,DOCUSATE SODIUM 1 TABLET: 8.6; 5 TABLET, FILM COATED ORAL at 10:07

## 2019-07-14 RX ADMIN — NIMODIPINE 60 MG: 30 CAPSULE, LIQUID FILLED ORAL at 06:07

## 2019-07-14 RX ADMIN — SENNOSIDES,DOCUSATE SODIUM 1 TABLET: 8.6; 5 TABLET, FILM COATED ORAL at 09:07

## 2019-07-14 RX ADMIN — ONDANSETRON 4 MG: 2 INJECTION INTRAMUSCULAR; INTRAVENOUS at 10:07

## 2019-07-14 RX ADMIN — GABAPENTIN 100 MG: 100 CAPSULE ORAL at 10:07

## 2019-07-14 RX ADMIN — ENOXAPARIN SODIUM 40 MG: 100 INJECTION SUBCUTANEOUS at 05:07

## 2019-07-14 RX ADMIN — NIMODIPINE 60 MG: 30 CAPSULE, LIQUID FILLED ORAL at 02:07

## 2019-07-14 RX ADMIN — GABAPENTIN 100 MG: 100 CAPSULE ORAL at 09:07

## 2019-07-14 RX ADMIN — IOHEXOL 100 ML: 350 INJECTION, SOLUTION INTRAVENOUS at 07:07

## 2019-07-14 NOTE — ASSESSMENT & PLAN NOTE
-nontraumatic SAH  -HH2 F2 SAH secondary to ruptured ACOM aneurysm now s/p coiling 7/5  -NSGY following  -daily TCDs  -Nimotop  -SBP<200  -continue IVF  -PT/OT  -neuro checks q 1 hr  -stepped down to neuro step down on 7/13. Then stepped back to neuro ICU on 7/14 for concern for vasospasm

## 2019-07-14 NOTE — PLAN OF CARE
Patient's chart was reviewed by a stroke team provider.  Patient non neurologic issues with greater urgency.  There is no new imaging to review.  Pending diagnostics to follow up on include: Cerebral angiogram.  For other recommendations please see our previous note completed on: 7/12/2019.  TCD 7/14: improve vasospasm in left CAROLINA    There are no new recommendations at this time. Will continue to follow. Discussed patient with staff. Please contact stroke team for any questions or concerns.     Gianna Rice MD  Department of Vascular Neurology

## 2019-07-14 NOTE — SUBJECTIVE & OBJECTIVE
Interval History: TCDs WNL yesterday however altered this AM, drowsy, awakens to voice but cannot maintain attention. Continues to c/o headache and back stiffness. No pain meds given yet this AM. Difficulty following commands especially in RLE. Stat 1L bolus and CTA ordered.    CTA with evidence of R A1-2 vasospasm. Discussed need for angio with NCC/step up to unit.     Medications:  Continuous Infusions:   lactated ringers 100 mL/hr at 07/14/19 0657     Scheduled Meds:   enoxparin  40 mg Subcutaneous Q24H    gabapentin  100 mg Oral TID    gabapentin  200 mg Oral QHS    montelukast  10 mg Oral Daily    niMODipine  60 mg Oral Q4H    pantoprazole  40 mg Oral BID    senna-docusate 8.6-50 mg  1 tablet Oral BID    sodium chloride 0.9% bolus  1,000 mL Intravenous Once     PRN Meds:butalbital-acetaminophen-caffeine -40 mg, diazePAM, magnesium oxide, magnesium oxide, ondansetron, potassium chloride 10%, potassium chloride 10%, potassium chloride 10%, potassium, sodium phosphates, potassium, sodium phosphates, potassium, sodium phosphates, ramelteon, simethicone, sodium chloride 0.9%     Review of Systems  Objective:     Weight: 69.9 kg (154 lb)  Body mass index is 25.63 kg/m².  Vital Signs (Most Recent):  Temp: 99.9 °F (37.7 °C) (07/14/19 1000)  Pulse: 109 (07/14/19 1129)  Resp: 20 (07/14/19 1000)  BP: (!) 185/95 (07/14/19 1000)  SpO2: 96 % (07/14/19 1000) Vital Signs (24h Range):  Temp:  [97.6 °F (36.4 °C)-99.9 °F (37.7 °C)] 99.9 °F (37.7 °C)  Pulse:  [] 109  Resp:  [17-20] 20  SpO2:  [95 %-97 %] 96 %  BP: (122-186)/(58-95) 185/95                          Neurosurgery Physical Exam   Drowsy, awakens to voice; will not participate w/orientation questions  PERRL, EOMI, face symm, tongue midline  SILT  No drift  Grossly full strength in BUE, LLE  Unable to DF/PF RLE - unclear if weakness v attention    Significant Labs:  Recent Labs   Lab 07/13/19  0351 07/14/19  0621    105    137   K 3.9  3.9    101   CO2 26 22*   BUN 13 11   CREATININE 0.7 0.7   CALCIUM 9.1 9.3   MG 2.1 2.2     Recent Labs   Lab 07/13/19  0351 07/14/19  0621   WBC 8.94 12.23   HGB 11.0* 11.6*   HCT 38.1 37.4    329     Significant Diagnostics:  I have reviewed and interpreted all pertinent imaging results/findings within the past 24 hours.   Cta Head    Result Date: 7/14/2019  In this patient status post A-comm coiling, there is redemonstration of hypoplastic right A1 segment, with subtle narrowing of the proximal left A2 segment which could reflect focal vasospasm. This report was flagged in Epic as abnormal. Electronically signed by resident: Wilfredo Schaeffer MD Date:    07/14/2019 Time:    07:51 Electronically signed by: Georges Lemons MD Date:    07/14/2019 Time:    09:39

## 2019-07-14 NOTE — PROGRESS NOTES
"Pt returned to room via stretcher. Transporter pulled pt to bed with draw sheet. Pt kept her eyes closed throughout transfer, would not respond to RN upon verbal cues, but instead only replied upon nurse shaking her chest for "arousal". Once pt was in bed, she spontaneously opened her eyes, and clearly, and without hesitation asked for cereal and milk. RN stated to pt that until the results of the CT were revealed, it was safest to remain NPO. RN continues to highly suspect some malingering and attention seeking behavior.   "

## 2019-07-14 NOTE — ASSESSMENT & PLAN NOTE
-gabapentin  -dexamethasone 4mg once PRN for headache earlier in the week. Headache grade 4 today. Will follow up TCD

## 2019-07-14 NOTE — PROGRESS NOTES
Patient arrived to [facility] from [facility]     Type of stroke/diagnosis: SAH      TPA start and end time: N/A    Thrombectomy start and end time: N/A    Current symptoms: headache    Skin assessment done: yes  Wounds noted: none    NCC notified: BRANDY George

## 2019-07-14 NOTE — PROGRESS NOTES
"Transportation in room with stretcher to transfer to CT. RN X 2 to bedside.  present. RNs witnessed pt in bed with eyes closed, lights off. Pt did not respond verbally upon cue, but squeezed her eyes tighter closed when the room light was turned on. Pt asked, "where I am going?" to which she was told that she was having a CT. Neuro exam completely normal. AAO X 4. Pt slow to get OOB. Stated that she required additional assistance, and reached for three people to assist her to standing position. Pt would not attempt to bear weight on her legs, making the transporter pivot, stand and turn her with assist X 2. Pt continued to keep her eyes closed throughout transfer, and intermittently would refuse to answer questions when prompted. However, when questions were directed to her repositioning for comfort, pt answered immediately, clearly, and without hesitation, while spontaneously opening her eyes. Pt refused to raise her head for staff to place a pillow under her head, remaining silent with her eyes closed, instead relying on staff to manually lift her head to place the pillow.  stated, "she is going in and out". RNs highly suspect some malingering and attention seeking behavior.   "

## 2019-07-14 NOTE — PROGRESS NOTES
RN in room performing physical assessment, and administering medications. Other family members are now present in the room. A blond female visitor is asking detailed medical questions, wants to know all data that the nurse gathered, and asked to have each medication explained to her. She states that she is not in the medical field. While RN was performing pt care, RN believes that this visitor took at least one photograph of the RN with her phone.

## 2019-07-14 NOTE — ASSESSMENT & PLAN NOTE
64 F who presented with severe HA, neck pain, vomiting and found to have HH2 F2 SAH secondary to ruptured ACOM aneurysm now s/p coiling 7/5:    PBD 11 PCD9    Neuro:  --Neurostepdown --> step up to NCC      -Q2h neurochecks while in [place]  --HOB@30  --Keppra 500 BID x7d (complete)  --TCDs daily x14d (AM pending)  --Nimotop 60q4 x 21d  --Pain control - HA relieved by steroid IV in past; will trial Fioricet  --Continue to follow clinically and notify NSGY immediately if any neurostatus change  STAT CTA REVIEWED, [facility] FOR ANGIOGRAM    CVS:  ---220 mmHg (cardene ggt; hydralazine & labetalol PRN)  --Echo normal    Pulm:  --on RA    GIT/Electrolytes:  --CMP daily      -Replete electrolytes PRN  --Na goal >135    Renal:  --Strict I/Os  --Goal of euvolemia or net +1L  - MIVF  - STAT 1L bolus and increase MIVF    Heme/ID:  --Daily CBC      -Transfuse PRN  --Trend WBC and T    PPx:  --TEDs/SCDs  --Lovenox  --PPI    Dispo: Transfer to [facility]

## 2019-07-14 NOTE — PROGRESS NOTES
"Pt's  walking the hallway, seeking RN every 5-10 minutes, requesting RN to room "emergently" for such requests as requesting a cart to load pt's belongings. Within the last 15 minutes, pt has urinated on herself twice, with a Purewick in place, after nurse informed pt to call for a bedpan. Pt makes no effort to turn herself in bed for cleaning, instead relying on staff and her  for turning. Pt does not raise her arms, even when requested, to assist with changing her gown. Makes no effort to hold the own weight of her upper extremities, while keeping her eyes closed.   "

## 2019-07-14 NOTE — SUBJECTIVE & OBJECTIVE
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 NSGY consulted for SAH found on CT head.  - Neurologically stable on initial assessment. HH2F2  - Stat CTA head ordered - CT head @11am reviewed. Diffuse SAH within basal cisterns and midline falx. No evidence for hydrocephalus on this study. No mass effect or midline shift.     PONV (postoperative nausea and vomiting)     for general anesthesia     Past Surgical History:   Procedure Laterality Date    ANGIOGRAM-CEREBRAL, acomm aneurysm N/A 7/5/2019    Performed by Turner [facility]on [facility] at [facility]    Arm Surgery      torn tendon    BACK SURGERY      x2    HYSTERECTOMY      RENETTA    MASTECTOMY      with reconstruction left breast    OOPHORECTOMY      RADIOFREQUENCY THERMAL COAGULATION, NERVE, SPINAL, CERVICAL, POSTERIOR RAMUS, MEDIAL BRANCH Left 2/8/2019    Performed by Raymond Britton MD at [facility] OR    Tonsillectomy      TONSILLECTOMY        Current Facility-Administered Medications on File Prior to Encounter   Medication Dose Route Frequency Provider Last Rate Last Dose    sodium chloride 0.9% flush 10 mL  10 mL Intravenous PRN Raymond Britton MD         Current Outpatient Medications on File Prior to Encounter   Medication Sig Dispense Refill    eszopiclone 3 mg Tab Take 3 mg by mouth nightly.  2    levocetirizine (XYZAL) 5 MG tablet Take 5 mg by mouth once daily.      montelukast (SINGULAIR) 10 mg tablet   0    pantoprazole (PROTONIX) 40 MG tablet Take 40 mg by mouth 2 (two) times daily.   2      Allergies: Codeine; Pcn [penicillins]; Strawberry; Sulfa (sulfonamide antibiotics); and Sulfanilamide    Family History   Problem Relation Age of Onset    Breast cancer Neg Hx     Ovarian cancer  Neg Hx      Social History     Tobacco Use    Smoking status: Never Smoker    Smokeless tobacco: Never Used   Substance Use Topics    Alcohol use: No    Drug use: No     Review of Systems   Constitutional: Negative.    HENT: Negative.    Eyes: Negative.    Respiratory: Negative.    Cardiovascular: Negative.    Gastrointestinal: Negative.    Endocrine: Negative.    Genitourinary: Negative.    Musculoskeletal: Negative.    Allergic/Immunologic: Negative.    Neurological: Negative for dizziness, tremors, seizures, syncope, facial asymmetry, speech difficulty, weakness, light-headedness and numbness.        Headache at grade 4-5   Hematological: Negative.    Psychiatric/Behavioral: Negative.      Objective:     Vitals:    Temp: 97.9 °F (36.6 °C)  Pulse: 102  Rhythm: sinus tachycardia  BP: (!) 186/87  Resp: 18  SpO2: 96 %  O2 Device (Oxygen Therapy): room air    Temp  Min: 97.6 °F (36.4 °C)  Max: 98.9 °F (37.2 °C)  Pulse  Min: 81  Max: 103  BP  Min: 122/58  Max: 186/87  Resp  Min: 17  Max: 18  SpO2  Min: 95 %  Max: 97 %    07/13 0701 - 07/14 0700  In: 1740 [P.O.:340; I.V.:1400]  Out: 1050 [Urine:1050]   Unmeasured Output  Urine Occurrence: 1  Stool Occurrence: 0  Emesis Occurrence: 0  Pad Count: 2       Physical Exam   Constitutional: She is oriented to person, place, and time. She appears well-developed and well-nourished.   HENT:   Head: Normocephalic.   Eyes: Pupils are equal, round, and reactive to light. Conjunctivae and EOM are normal. Right eye exhibits no discharge. Left eye exhibits no discharge. No scleral icterus.   Cardiovascular: Normal rate and regular rhythm.   Pulmonary/Chest: Effort normal and breath sounds normal.   Abdominal: Soft.   Neurological: She is oriented to person, place, and time. She has normal strength and normal reflexes. She displays normal reflexes. No cranial nerve deficit or sensory deficit. She exhibits normal muscle tone. Coordination normal.   Skin: Skin is warm. Capillary  refill takes less than 2 seconds.   Psychiatric: She has a normal mood and affect. Her behavior is normal. Judgment and thought content normal.         Today I personally reviewed pertinent medications, lines/drains/airways, imaging, cardiology results, laboratory results, microbiology results, notably:

## 2019-07-14 NOTE — H&P
[facility]  Neurocritical Care  History & Physical    Admit Date: 7/4/2019  Service Date: 07/14/2019  Length of Stay: 10    Subjective:     Chief Complaint: Nontraumatic subarachnoid hemorrhage from anterior communicating artery    History of Present Illness: 64 y F with medical h.o breast cancer s/p mastectomy, lumpectomy, who presented to the ER as a transfer from [facility] as SAH detected on CTH.  Patient's symptoms started on Tuesday 7/2 when she was undergoing a CT abdomen for evaluation of indigestion. She started to feel a pounding and congestion of head and ears. She did not seek medical attention till today 7/4 when she went to urgent care. Urgent care doc did not feel comfortable treating it as migraine and sent patient to ER; where CT head detected SAH. She is transferred here to [facility].  Here a CTA detected CAROLINA aneurysm and patient is being sent for angio and further neurosurg intervention,.    Interval history: ruptured ACOM aneurysm now s/p coiling 7/5.  Patient was transferred to neuro [place] [id number]  On 7/14, stepped back up to Neuro ICU for concern for vasospasm    Past Medical History:   Diagnosis Date    Asthma     Breast cancer     Fibromyalgia     Nontraumatic subarachnoid hemorrhage from anterior communicating artery 7/4/2019    65 yo with hx of breast cancer presents to the ED with complaints of intractable headache after a barium CT abdomen on 7/2. NSGY consulted for SAH found on CT head.  - Neurologically stable on initial assessment. HH2F2  - Stat CTA head ordered - CT head @11am reviewed. Diffuse SAH within basal cisterns and midline falx. No evidence for hydrocephalus on this study. No mass effect or midline shift.     PONV (postoperative nausea and vomiting)     for general anesthesia     Past Surgical History:   Procedure Laterality Date    ANGIOGRAM-CEREBRAL, acomm aneurysm N/A 7/5/2019    Performed by Turner [facility]ke[facility]    Arm  Surgery      torn tendon    BACK SURGERY      x2    HYSTERECTOMY      RENETTA    MASTECTOMY      with reconstruction left breast    OOPHORECTOMY      RADIOFREQUENCY THERMAL COAGULATION, NERVE, SPINAL, CERVICAL, POSTERIOR RAMUS, MEDIAL BRANCH Left 2/8/2019    Performed by Raymond [facility] at [facility] OR    Tonsillectomy      TONSILLECTOMY        Current Facility-Administered Medications on File Prior to Encounter   Medication Dose Route Frequency Provider Last Rate Last Dose    sodium chloride 0.9% flush 10 mL  10 mL Intravenous PRN Raymond Britton MD         Current Outpatient Medications on File Prior to Encounter   Medication Sig Dispense Refill    eszopiclone 3 mg Tab Take 3 mg by mouth nightly.  2    levocetirizine (XYZAL) 5 MG tablet Take 5 mg by mouth once daily.      montelukast (SINGULAIR) 10 mg tablet   0    pantoprazole (PROTONIX) 40 MG tablet Take 40 mg by mouth 2 (two) times daily.   2      Allergies: Codeine; Pcn [penicillins]; Strawberry; Sulfa (sulfonamide antibiotics); and Sulfanilamide    Family History   Problem Relation Age of Onset    Breast cancer Neg Hx     Ovarian cancer Neg Hx      Social History     Tobacco Use    Smoking status: Never Smoker    Smokeless tobacco: Never Used   Substance Use Topics    Alcohol use: No    Drug use: No     Review of Systems   Constitutional: Negative.    HENT: Negative.    Eyes: Negative.    Respiratory: Negative.    Cardiovascular: Negative.    Gastrointestinal: Negative.    Endocrine: Negative.    Genitourinary: Negative.    Musculoskeletal: Negative.    Allergic/Immunologic: Negative.    Neurological: Negative for dizziness, tremors, seizures, syncope, facial asymmetry, speech difficulty, weakness, light-headedness and numbness.        Headache at grade 4-5   Hematological: Negative.    Psychiatric/Behavioral: Negative.      Objective:     Vitals:    Temp: 97.9 °F (36.6 °C)  Pulse: 102  Rhythm: sinus tachycardia  BP: (!) 186/87  Resp:  18  SpO2: 96 %  O2 Device (Oxygen Therapy): room air    Temp  Min: 97.6 °F (36.4 °C)  Max: 98.9 °F (37.2 °C)  Pulse  Min: 81  Max: 103  BP  Min: 122/58  Max: 186/87  Resp  Min: 17  Max: 18  SpO2  Min: 95 %  Max: 97 %    07/13 0701 - 07/14 0700  In: 1740 [P.O.:340; I.V.:1400]  Out: 1050 [Urine:1050]   Unmeasured Output  Urine Occurrence: 1  Stool Occurrence: 0  Emesis Occurrence: 0  Pad Count: 2       Physical Exam   Constitutional: She is oriented to person, place, and time. She appears well-developed and well-nourished.   HENT:   Head: Normocephalic.   Eyes: Pupils are equal, round, and reactive to light. Conjunctivae and EOM are normal. Right eye exhibits no discharge. Left eye exhibits no discharge. No scleral icterus.   Cardiovascular: Normal rate and regular rhythm.   Pulmonary/Chest: Effort normal and breath sounds normal.   Abdominal: Soft.   Neurological: She is oriented to person, place, and time. She has normal strength and normal reflexes. She displays normal reflexes. No cranial nerve deficit or sensory deficit. She exhibits normal muscle tone. Coordination normal.   Skin: Skin is warm. Capillary refill takes less than 2 seconds.   Psychiatric: She has a normal mood and affect. Her behavior is normal. Judgment and thought content normal.         Today I personally reviewed pertinent medications, lines/drains/airways, imaging, cardiology results, laboratory results, microbiology results, notably:        Assessment/Plan:     Neuro  * Nontraumatic subarachnoid hemorrhage from anterior communicating artery  -nontraumatic SAH  -HH2 F2 SAH secondary to ruptured ACOM aneurysm now s/p coiling 7/5  -NSGY following  -daily TCDs  -Nimotop  -SBP<200  -continue IVF  -PT/OT  -neuro checks q 1 hr  -stepped down to neuro step down on 7/13. Then stepped back to neuro ICU on 7/14 for concern for vasospasm    Headache  -gabapentin  -dexamethasone 4mg once PRN for headache earlier in the week. Headache grade 4 today. Will  follow up TCD    Cerebral vasospasm  Continue nimotop   7/14: gave bolus LR, TCD pending    Aneurysm of anterior communicating artery  S/p coiling            The patient is being Prophylaxed for:  Venous Thromboembolism with: Mechanical or Chemical  Stress Ulcer with: PPI  Ventilator Pneumonia with: not applicable    Activity Orders          Diet NPO: NPO starting at 07/13 0001        Full Code    Yamilka Melo MD  Neurocritical Care  [facility]

## 2019-07-15 PROBLEM — K76.0 FATTY LIVER DISEASE, NONALCOHOLIC: Status: ACTIVE | Noted: 2019-07-15

## 2019-07-15 LAB
ALBUMIN SERPL BCP-MCNC: 3.1 G/DL (ref 3.5–5.2)
ALP SERPL-CCNC: 130 U/L (ref 55–135)
ALT SERPL W/O P-5'-P-CCNC: 42 U/L (ref 10–44)
ANION GAP SERPL CALC-SCNC: 12 MMOL/L (ref 8–16)
AST SERPL-CCNC: 18 U/L (ref 10–40)
BASOPHILS # BLD AUTO: 0.04 K/UL (ref 0–0.2)
BASOPHILS NFR BLD: 0.3 % (ref 0–1.9)
BILIRUB SERPL-MCNC: 0.5 MG/DL (ref 0.1–1)
BUN SERPL-MCNC: 10 MG/DL (ref 8–23)
CALCIUM SERPL-MCNC: 8.7 MG/DL (ref 8.7–10.5)
CHLORIDE SERPL-SCNC: 103 MMOL/L (ref 95–110)
CO2 SERPL-SCNC: 21 MMOL/L (ref 23–29)
CREAT SERPL-MCNC: 0.7 MG/DL (ref 0.5–1.4)
DIFFERENTIAL METHOD: ABNORMAL
EOSINOPHIL # BLD AUTO: 0.1 K/UL (ref 0–0.5)
EOSINOPHIL NFR BLD: 0.9 % (ref 0–8)
ERYTHROCYTE [DISTWIDTH] IN BLOOD BY AUTOMATED COUNT: 13.6 % (ref 11.5–14.5)
EST. GFR  (AFRICAN AMERICAN): >60 ML/MIN/1.73 M^2
EST. GFR  (NON AFRICAN AMERICAN): >60 ML/MIN/1.73 M^2
GLUCOSE SERPL-MCNC: 110 MG/DL (ref 70–110)
HCT VFR BLD AUTO: 33.1 % (ref 37–48.5)
HGB BLD-MCNC: 10.2 G/DL (ref 12–16)
IMM GRANULOCYTES # BLD AUTO: 0.24 K/UL (ref 0–0.04)
IMM GRANULOCYTES NFR BLD AUTO: 2 % (ref 0–0.5)
LYMPHOCYTES # BLD AUTO: 2.3 K/UL (ref 1–4.8)
LYMPHOCYTES NFR BLD: 19 % (ref 18–48)
MAGNESIUM SERPL-MCNC: 2 MG/DL (ref 1.6–2.6)
MCH RBC QN AUTO: 27.2 PG (ref 27–31)
MCHC RBC AUTO-ENTMCNC: 30.8 G/DL (ref 32–36)
MCV RBC AUTO: 88 FL (ref 82–98)
MONOCYTES # BLD AUTO: 1.4 K/UL (ref 0.3–1)
MONOCYTES NFR BLD: 11.4 % (ref 4–15)
NEUTROPHILS # BLD AUTO: 7.8 K/UL (ref 1.8–7.7)
NEUTROPHILS NFR BLD: 66.4 % (ref 38–73)
NRBC BLD-RTO: 0 /100 WBC
PHOSPHATE SERPL-MCNC: 3.1 MG/DL (ref 2.7–4.5)
PLATELET # BLD AUTO: 319 K/UL (ref 150–350)
PMV BLD AUTO: 9.2 FL (ref 9.2–12.9)
POTASSIUM SERPL-SCNC: 3.8 MMOL/L (ref 3.5–5.1)
PROT SERPL-MCNC: 6.2 G/DL (ref 6–8.4)
RBC # BLD AUTO: 3.75 M/UL (ref 4–5.4)
SODIUM SERPL-SCNC: 136 MMOL/L (ref 136–145)
WBC # BLD AUTO: 11.83 K/UL (ref 3.9–12.7)

## 2019-07-15 PROCEDURE — 25000003 PHARM REV CODE 250: Performed by: PSYCHIATRY & NEUROLOGY

## 2019-07-15 PROCEDURE — 84100 ASSAY OF PHOSPHORUS: CPT

## 2019-07-15 PROCEDURE — 25000003 PHARM REV CODE 250: Performed by: NURSE PRACTITIONER

## 2019-07-15 PROCEDURE — 99291 PR CRITICAL CARE, E/M 30-74 MINUTES: ICD-10-PCS | Mod: ,,, | Performed by: PSYCHIATRY & NEUROLOGY

## 2019-07-15 PROCEDURE — 25000003 PHARM REV CODE 250: Performed by: STUDENT IN AN ORGANIZED HEALTH CARE EDUCATION/TRAINING PROGRAM

## 2019-07-15 PROCEDURE — 25000003 PHARM REV CODE 250: Performed by: UROLOGY

## 2019-07-15 PROCEDURE — 80053 COMPREHEN METABOLIC PANEL: CPT

## 2019-07-15 PROCEDURE — 63600175 PHARM REV CODE 636 W HCPCS: Performed by: NURSE PRACTITIONER

## 2019-07-15 PROCEDURE — 20000000 HC ICU ROOM

## 2019-07-15 PROCEDURE — 94761 N-INVAS EAR/PLS OXIMETRY MLT: CPT

## 2019-07-15 PROCEDURE — 85025 COMPLETE CBC W/AUTO DIFF WBC: CPT

## 2019-07-15 PROCEDURE — 83735 ASSAY OF MAGNESIUM: CPT

## 2019-07-15 PROCEDURE — 99291 CRITICAL CARE FIRST HOUR: CPT | Mod: ,,, | Performed by: PSYCHIATRY & NEUROLOGY

## 2019-07-15 RX ORDER — ACETAMINOPHEN 325 MG/1
650 TABLET ORAL EVERY 6 HOURS PRN
Status: DISCONTINUED | OUTPATIENT
Start: 2019-07-15 | End: 2019-07-18 | Stop reason: HOSPADM

## 2019-07-15 RX ORDER — TIZANIDINE 2 MG/1
2 TABLET ORAL NIGHTLY PRN
Status: DISCONTINUED | OUTPATIENT
Start: 2019-07-15 | End: 2019-07-18 | Stop reason: HOSPADM

## 2019-07-15 RX ORDER — DEXAMETHASONE SODIUM PHOSPHATE 4 MG/ML
4 INJECTION, SOLUTION INTRA-ARTICULAR; INTRALESIONAL; INTRAMUSCULAR; INTRAVENOUS; SOFT TISSUE ONCE
Status: COMPLETED | OUTPATIENT
Start: 2019-07-15 | End: 2019-07-15

## 2019-07-15 RX ORDER — LIDOCAINE 50 MG/G
1 PATCH TOPICAL
Status: DISCONTINUED | OUTPATIENT
Start: 2019-07-15 | End: 2019-07-15

## 2019-07-15 RX ORDER — POLYETHYLENE GLYCOL 3350 17 G/17G
17 POWDER, FOR SOLUTION ORAL 2 TIMES DAILY
Status: DISCONTINUED | OUTPATIENT
Start: 2019-07-15 | End: 2019-07-18 | Stop reason: HOSPADM

## 2019-07-15 RX ADMIN — POLYETHYLENE GLYCOL 3350 17 G: 17 POWDER, FOR SOLUTION ORAL at 11:07

## 2019-07-15 RX ADMIN — GABAPENTIN 100 MG: 100 CAPSULE ORAL at 09:07

## 2019-07-15 RX ADMIN — ACETAMINOPHEN 650 MG: 325 TABLET ORAL at 05:07

## 2019-07-15 RX ADMIN — MONTELUKAST SODIUM 10 MG: 10 TABLET, COATED ORAL at 09:07

## 2019-07-15 RX ADMIN — POTASSIUM CHLORIDE 40 MEQ: 20 SOLUTION ORAL at 05:07

## 2019-07-15 RX ADMIN — DEXAMETHASONE SODIUM PHOSPHATE 4 MG: 4 INJECTION, SOLUTION INTRAMUSCULAR; INTRAVENOUS at 11:07

## 2019-07-15 RX ADMIN — PANTOPRAZOLE SODIUM 40 MG: 40 TABLET, DELAYED RELEASE ORAL at 09:07

## 2019-07-15 RX ADMIN — SODIUM CHLORIDE, SODIUM LACTATE, POTASSIUM CHLORIDE, AND CALCIUM CHLORIDE 125 ML/HR: 600; 310; 30; 20 INJECTION, SOLUTION INTRAVENOUS at 02:07

## 2019-07-15 RX ADMIN — NIMODIPINE 60 MG: 30 CAPSULE, LIQUID FILLED ORAL at 09:07

## 2019-07-15 RX ADMIN — GABAPENTIN 200 MG: 100 CAPSULE ORAL at 09:07

## 2019-07-15 RX ADMIN — NIMODIPINE 60 MG: 30 CAPSULE, LIQUID FILLED ORAL at 05:07

## 2019-07-15 RX ADMIN — NIMODIPINE 60 MG: 30 CAPSULE, LIQUID FILLED ORAL at 02:07

## 2019-07-15 RX ADMIN — SENNOSIDES,DOCUSATE SODIUM 1 TABLET: 8.6; 5 TABLET, FILM COATED ORAL at 09:07

## 2019-07-15 RX ADMIN — TIZANIDINE 2 MG: 2 TABLET ORAL at 09:07

## 2019-07-15 RX ADMIN — BUTALBITAL, ACETAMINOPHEN AND CAFFEINE 1 TABLET: 50; 325; 40 TABLET ORAL at 07:07

## 2019-07-15 RX ADMIN — GABAPENTIN 100 MG: 100 CAPSULE ORAL at 02:07

## 2019-07-15 RX ADMIN — BUTALBITAL, ACETAMINOPHEN AND CAFFEINE 1 TABLET: 50; 325; 40 TABLET ORAL at 01:07

## 2019-07-15 RX ADMIN — ENOXAPARIN SODIUM 40 MG: 100 INJECTION SUBCUTANEOUS at 05:07

## 2019-07-15 NOTE — PT/OT/SLP DISCHARGE
Occupational Therapy Discharge Summary    Gissel Jamison  MRN: [id number]   Principal Problem: Nontraumatic subarachnoid hemorrhage from anterior communicating artery      Patient Discharged from acute Occupational Therapy on 7/15.  Please refer to prior OT note for functional status.    Assessment:      Patient appropriate for care in another setting.    Objective:     GOALS:   Multidisciplinary Problems     Occupational Therapy Goals     Not on file          Multidisciplinary Problems (Resolved)        Problem: Occupational Therapy Goal    Goal Priority Disciplines Outcome Interventions   Occupational Therapy Goal   (Resolved)     OT, PT/OT Outcome(s) achieved    Description:  Goals set 7/7 to be addressed for 14 days with expiration date, 7/21:  Patient will increase functional independence with ADLs by performing:  Patient will demonstrate stand pivot transfers with modified independence.   Not met  Patient will demonstrate grooming while standing with modified independence.   Not met  Patient will demonstrate upper body dressing with modified independence.   Not met  Patient will demonstrate lower body dressing with modified independence.   Not met  Patient will demonstrate toileting with modified independence.   Not met  Patient will demonstrate bathing while seated EOB with modified independence.   Not met                        Reasons for Discontinuation of Therapy Services  Transfer to alternate level of care.      Plan:     Patient Discharged to: Discharged from OT services on acute 2* TF to [facility] 7/14; await new orders to resume OT services.    LARA Batres  7/15/2019

## 2019-07-15 NOTE — ASSESSMENT & PLAN NOTE
[facility]   [facility]: gave bolus LR, TCD pending  07/15/2019: TCDs L CAROLINA slightly worse - correlates with previous symptoms, accurate I/O, asymptomatic

## 2019-07-15 NOTE — PLAN OF CARE
Problem: Adult Inpatient Plan of Care  Goal: Plan of Care Review  Outcome: Ongoing (interventions implemented as appropriate)  POC reviewed with pt and  at 1400. Pt and  verbalized understanding. Questions and concerns addressed. No acute events today. Pt progressing toward goals. LR @125. Back pain relieved by dexamethasone injection.  Will continue to monitor. See flowsheets for full assessment and VS info.

## 2019-07-15 NOTE — PLAN OF CARE
Problem: Adult Inpatient Plan of Care  Goal: Plan of Care Review  Outcome: Ongoing (interventions implemented as appropriate)  Nutrition assessment completed. Please see RD note for details.    Recommendation/Intervention:   Continue Regular diet order. Pt consuming small amts from trays however  bringing in outside food per pt request.     RD to monitor.    Goals: Pt to continue tolerating >85% EEN and EPN during admission.  Nutrition Goal Status: new  Communication of RD Recs: reviewed with RN

## 2019-07-15 NOTE — PROGRESS NOTES
[facility]  Neurosurgery  Progress Note    Subjective:     History of Present Illness: 63 yo with hx of breast cancer presents to the ED with complaints of headache after a barium CT abdomen on Tuesday. Reports intractable headache which began 2 days ago. Associated n/v, neck pain. Denies visual changes, weakness, paresthesias, b/b dysfunction. No trauma, falls, or LOC. She does not take antiplatelet therapy or anticoagulants. Currently on a cardene drip started at the outside hospital. She states she is tired from the morphine given prior to transport. Family at bedside.       Post-Op Info:  Procedure(s) (LRB):  ANGIOGRAM-CEREBRAL, acomm aneurysm (N/A)   10 Days Post-Op     Interval History: PBD 12. Exam improved yesterday afternoon w/fluids; angio postponed. Tmax 101 yesterday evening, resolved. TCDs this AM WNL, comparable to day prior. Continues to c/o headache and back pain.     Medications:  Continuous Infusions:   lactated ringers 125 mL/hr at 07/15/19 0905     Scheduled Meds:   enoxparin  40 mg Subcutaneous Q24H    gabapentin  100 mg Oral TID    gabapentin  200 mg Oral QHS    montelukast  10 mg Oral Daily    niMODipine  60 mg Oral Q4H    pantoprazole  40 mg Oral BID    senna-docusate 8.6-50 mg  1 tablet Oral BID    sodium chloride 0.9% bolus  1,000 mL Intravenous Once     PRN Meds:butalbital-acetaminophen-caffeine -40 mg, magnesium oxide, magnesium oxide, ondansetron, potassium chloride 10%, potassium chloride 10%, potassium chloride 10%, potassium, sodium phosphates, potassium, sodium phosphates, potassium, sodium phosphates, ramelteon, simethicone, sodium chloride 0.9%     Review of Systems  Objective:     Weight: 69.9 kg (154 lb)  Body mass index is 25.63 kg/m².  Vital Signs (Most Recent):  Temp: 98.9 °F (37.2 °C) (07/15/19 0705)  Pulse: 79 (07/15/19 0921)  Resp: 12 (07/15/19 0921)  BP: (!) 148/69 (07/15/19 0905)  SpO2: 96 % (07/15/19 0921) Vital Signs (24h  Range):  Temp:  [98.6 °F (37 °C)-101.4 °F (38.6 °C)] 98.9 °F (37.2 °C)  Pulse:  [] 79  Resp:  [12-21] 12  SpO2:  [94 %-100 %] 96 %  BP: (120-186)/() 148/69     Date 07/15/19 0700 - 07/16/19 0659   Shift 0372-8450 4615-5242 1471-8710 24 Hour Total   INTAKE   I.V.(mL/kg) 375(5.4)   375(5.4)   Shift Total(mL/kg) 375(5.4)   375(5.4)   OUTPUT   Shift Total(mL/kg)       Weight (kg) 69.9 69.9 69.9 69.9                   Female External Urinary Catheter 07/14/19 1820 (Active)   Skin no redness;no breakdown 7/15/2019  3:05 AM   Tolerance no signs/symptoms of discomfort 7/15/2019  3:05 AM   Suction Continuous suction at 60 mmHg 7/14/2019  7:05 PM   Date of last wick change 07/14/19 7/14/2019  7:05 PM   Output (mL) 600 mL 7/15/2019  6:05 AM       Neurosurgery Physical Exam   AOX3, speech fluent  PERRL, EOMI, face symm, tongue midline  CAMPBELL symm  Grossly full strength in BUE/BLE  SILT  No drift    Significant Labs:  Recent Labs   Lab 07/14/19  0621 07/15/19  0143    110    136   K 3.9 3.8    103   CO2 22* 21*   BUN 11 10   CREATININE 0.7 0.7   CALCIUM 9.3 8.7   MG 2.2 2.0     Recent Labs   Lab 07/14/19  0621 07/14/19  1829 07/15/19  0143   WBC 12.23 12.67 11.83   HGB 11.6* 11.0* 10.2*   HCT 37.4 35.4* 33.1*    299 319         Significant Diagnostics:  I have reviewed and interpreted all pertinent imaging results/findings within the past 24 hours.   Us Transcran Doppler Intracran Artr Comp    Result Date: 7/14/2019  Findings suggest left anterior cerebral artery vasospasm which has improved since yesterday Electronically signed by: Howard Silva MD Date:    07/14/2019 Time:    14:09    AM TCDs reviewed.     Assessment/Plan:     Aneurysm of anterior communicating artery  64 F who presented with severe HA, neck pain, vomiting and found to have HH2 F2 SAH secondary to ruptured ACOM aneurysm now s/p coiling 7/5:    PBD 12 PCD10    Neuro:  --Continue NCC - will remain in unit until at least  PBD14      -Q1h neurochecks  --HOB@30  --Keppra 500 BID x7d (complete)  --TCDs daily x14d (AM reviewed)  --Nimotop 60q4 x 21d  --Pain control - HA relieved by steroid IV in past; will trial Fioricet  --Continue to follow clinically and notify NSGY immediately if any neurostatus change  Will require angiogram if returns into vasospasm    CVS:  ---220 mmHg (cardene ggt; hydralazine & labetalol PRN)  --Echo normal    Pulm:  --on RA    GIT/Electrolytes:  --CMP daily      -Replete electrolytes PRN  --Na goal >135    Renal:  --Strict I/Os  --Goal of euvolemia or net +1L  - MIVF    Heme/ID:  --Daily CBC      -Transfuse PRN  --Trend WBC and T    PPx:  --TEDs/SCDs  --Lovenox  --PPI    Dispo: ICU        Lizbeth Packer MD  Neurosurgery  [facility]

## 2019-07-15 NOTE — PLAN OF CARE
07/15/19 1621   Discharge Reassessment   Assessment Type Discharge Planning Reassessment   Provided patient/caregiver education on the expected discharge date and the discharge plan Yes   Do you have any problems affording any of your prescribed medications? TBD   Discharge Plan A Home with family   Discharge Plan B Home with family   DME Needed Upon Discharge  none   Patient choice form signed by patient/caregiver N/A   Anticipated Discharge Disposition Home   Can the patient answer the patient profile reliably? Yes, cognitively intact   How does the patient rate their overall health at the present time? Good   Describe the patient's ability to walk at the present time. No restrictions   How often would a person be available to care for the patient? Often   Number of comorbid conditions (as recorded on the chart) None   During the past month, has the patient often been bothered by feeling down, depressed or hopeless? No   During the past month, has the patient often been bothered by little interest or pleasure in doing things? No   Post-Acute Status   Post-Acute Authorization Other   Post-Acute Placement Status Awaiting Internal Medical Clearance   Other Status No Post-Acute Service Needs   Discharge Delays None known at this time       Katie Munroe RN, CCRN-K, [facility]  Neuro-Critical Care   [phone]

## 2019-07-15 NOTE — PLAN OF CARE
Problem: Occupational Therapy Goal  Goal: Occupational Therapy Goal  Goals set 7/7 to be addressed for 14 days with expiration date, 7/21:  Patient will increase functional independence with ADLs by performing:  Patient will demonstrate stand pivot transfers with modified independence.   Not met  Patient will demonstrate grooming while standing with modified independence.   Not met  Patient will demonstrate upper body dressing with modified independence.   Not met  Patient will demonstrate lower body dressing with modified independence.   Not met  Patient will demonstrate toileting with modified independence.   Not met  Patient will demonstrate bathing while seated EOB with modified independence.   Not met       Outcome: Outcome(s) achieved Date Met: 07/15/19  D/C OT services 2* transfer to ICU 7/14; await new orders to resume OT services.  LARA Batres  7/15/2019

## 2019-07-15 NOTE — PROGRESS NOTES
[facility]  Neurocritical Care  Progress Note    Admit Date: 7/4/2019  Service Date: 07/15/2019  Length of Stay: 11    Subjective:     Chief Complaint: Nontraumatic subarachnoid hemorrhage from anterior communicating artery    History of Present Illness: 64 y F with medical h.o breast cancer s/p mastectomy, lumpectomy, who presented to the ER as a transfer from [facility] as SAH detected on CTH.  Patient's symptoms started on Tuesday 7/2 when she was undergoing a CT abdomen for evaluation of indigestion. She started to feel a pounding and congestion of head and ears. She did not seek medical attention till today 7/4 when she went to urgent care. Urgent care doc did not feel comfortable treating it as migraine and sent patient to ER; where CT head detected SAH. She is transferred here to [facility].  Here a CTA detected CAROLINA aneurysm and patient is being sent for angio and further neurosurg intervention,.    Interval history: ruptured ACOM aneurysm now s/p coiling 7/5.  [id number]  On 7/14, stepped back up to Neuro ICU for concern for vasospasm    Hospital Course: 07/05/2019 NAEO. For conventional angiogram today ACOM aneurysm on CTA  7/7/19: add lovenox for DVT proph., initiate IVF, PT/OT, liberalize BP <200  07/08/2019 headache. Mild to moderate cerebral VSP.   07/09/2019 NAEON  7/10/2019: increased night time gabapentin, add senna doc, CT head today (ordered per NSGY)-stable, continue TCDs and nimotop, dexamethasone 4 mg once PRN (for headache).   07/12/2019: severe headache overnight, resolved with steroids  7/14: stepped up to neuro ICU, concern for vasospasm. TCDs, bolus of fluids given  07/15/2019: stable exam, headache controlled with steroids    Interval History:  No acute adverse events overnight    Review of Systems    Constitutional: Denies fevers, weight loss, chills, or weakness.  Eyes: Denies changes in vision.  ENT: Denies dysphagia, nasal  discharge, ear pain or discharge.  Cardiovascular: Denies chest pain, palpitations, orthopnea, or claudication.  Respiratory: Denies shortness of breath, cough, hemoptysis, or wheezing.  GI: Denies nausea/vomitting, hematochezia, melena, abd pain, or changes in appetite.  : Denies dysuria, incontinence, or hematuria.  Musculoskeletal: Denies joint pain or myalgias.  Skin/breast: Denies rashes, lumps, lesions, or discharge.  Neurologic: Denies  dizziness, vertigo, or paresthesias. Complains of headache  Psychiatric: Denies changes in mood or hallucinations.  Endocrine: Denies polyuria, polydipsia, heat/cold intolerance.  Hematologic/Lymph: Denies lymphadenopathy, easy bruising or easy bleeding.  Allergic/Immunologic: Denies rash, rhinitis.   Objective:     Vitals:  Temp: 98.9 °F (37.2 °C)  Pulse: 87  Rhythm: normal sinus rhythm  BP: (!) 161/78  MAP (mmHg): 112  Resp: 17  SpO2: 97 %  O2 Device (Oxygen Therapy): room air    Temp  Min: 98.6 °F (37 °C)  Max: 101.4 °F (38.6 °C)  Pulse  Min: 75  Max: 97  BP  Min: 120/58  Max: 186/94  MAP (mmHg)  Min: 79  Max: 130  Resp  Min: 11  Max: 21  SpO2  Min: 94 %  Max: 100 %    07/14 0701 - 07/15 0700  In: 2968.3 [P.O.:150; I.V.:2818.3]  Out: 1500 [Urine:1500]   Unmeasured Output  Urine Occurrence: 1  Stool Occurrence: 0  Emesis Occurrence: 0  Pad Count: 1       Physical Exam    GA: Alert, comfortable, no acute distress.   HEENT: No scleral icterus or JVD.   Pulmonary: Clear to auscultation A/L.   Cardiac: RRR S1 & S2 w/o rubs/murmurs/gallops.   Abdominal: Bowel sounds present x 4. No appreciable hepatosplenomegaly.  Skin: No jaundice, rashes, or visible lesions.  Neuro:  --GCS: E4 V5 M6  --Mental Status: AAOX4, follows all commands, clear speech  --Pupils 3->2mm, PERRL.   --Corneal reflex, gag, cough intact.  --CAMPBELL spont and against gravity, no drift in upper extremities    Medications:  Continuous    lactated ringers Last Rate: 125 mL/hr at 07/15/19 1505   Scheduled    enoxparin  40 mg Q24H   gabapentin 100 mg TID   gabapentin 200 mg QHS   lidocaine 1 patch Q24H   montelukast 10 mg Daily   niMODipine 60 mg Q4H   pantoprazole 40 mg BID   polyethylene glycol 17 g BID   senna-docusate 8.6-50 mg 1 tablet BID   PRN    acetaminophen 650 mg Q6H PRN   magnesium oxide 800 mg PRN   magnesium oxide 800 mg PRN   ondansetron 4 mg Q6H PRN   potassium chloride 10% 40 mEq PRN   potassium chloride 10% 40 mEq PRN   potassium chloride 10% 60 mEq PRN   potassium, sodium phosphates 2 packet PRN   potassium, sodium phosphates 2 packet PRN   potassium, sodium phosphates 2 packet PRN   ramelteon 8 mg Nightly PRN   simethicone 1 tablet TID PRN   sodium chloride 0.9% 10 mL PRN   tiZANidine 2 mg Nightly PRN     Today I personally reviewed pertinent medications, lines/drains/airways, imaging, laboratory results,     Diet  Diet Adult Regular (IDDSI Level 7)      Assessment/Plan:     Neuro  * Nontraumatic subarachnoid hemorrhage from anterior communicating artery  -nontraumatic SAH  -HH2 F2 SAH secondary to ruptured ACOM aneurysm now s/p coiling 7/5  -NSGY following  -daily TCDs  -Nimotop  -SBP<200  -continue IVF  -PT/OT  -neuro checks q 1 hr  -vasospasm watch    Headache  -gabapentin  -dexamethasone 4mg once   -zanaflex at night    Cerebral vasospasm  Continue nimotop   7/14: gave bolus LR, TCD pending  07/15/2019: TCDs L CAROLINA slightly worse - correlates with previous symptoms, accurate I/O, asymptomatic    Aneurysm of anterior communicating artery  S/p coiling      Oncology  Anemia  Monitor  Transfuse if needed    GI  Fatty liver disease, nonalcoholic  Reported by patient from outside CT scan  Fatty infiltrate of liver seen on abdominal u/s    Transaminitis  Resolved  abd u/s with focal fatty infiltrate          The patient is being Prophylaxed for:  Venous Thromboembolism with: Mechanical or Chemical  Stress Ulcer with: PPI  Ventilator Pneumonia with: not applicable    Activity Orders          Diet Adult Regular (IDDSI  Level 7): Regular starting at 07/14 1558        Full Code    Frederic Greenberg MD  Neurocritical Care  [facility]    Critical condition in that Patient has a condition that poses threat to life and bodily function: SAH with symptomatic vasospasm     34 minutes of Critical care time was spent personally by me on the following activities: development of treatment plan with patient or surrogate and bedside caregivers, discussions with consultants, evaluation of patient's response to treatment, examination of patient, ordering and performing treatments and interventions, ordering and review of laboratory studies, ordering and review of radiographic studies, pulse oximetry, antibiotic titration if applicable, vasopressor titration if applicable, re-evaluation of patient's condition. This critical care time did not overlap with that of any other provider or involve time for any procedures.

## 2019-07-15 NOTE — SUBJECTIVE & OBJECTIVE
Interval History:  No acute adverse events overnight    Review of Systems    Constitutional: Denies fevers, weight loss, chills, or weakness.  Eyes: Denies changes in vision.  ENT: Denies dysphagia, nasal discharge, ear pain or discharge.  Cardiovascular: Denies chest pain, palpitations, orthopnea, or claudication.  Respiratory: Denies shortness of breath, cough, hemoptysis, or wheezing.  GI: Denies nausea/vomitting, hematochezia, melena, abd pain, or changes in appetite.  : Denies dysuria, incontinence, or hematuria.  Musculoskeletal: Denies joint pain or myalgias.  Skin/breast: Denies rashes, lumps, lesions, or discharge.  Neurologic: Denies  dizziness, vertigo, or paresthesias. Complains of headache  Psychiatric: Denies changes in mood or hallucinations.  Endocrine: Denies polyuria, polydipsia, heat/cold intolerance.  Hematologic/Lymph: Denies lymphadenopathy, easy bruising or easy bleeding.  Allergic/Immunologic: Denies rash, rhinitis.   Objective:     Vitals:  Temp: 98.9 °F (37.2 °C)  Pulse: 87  Rhythm: normal sinus rhythm  BP: (!) 161/78  MAP (mmHg): 112  Resp: 17  SpO2: 97 %  O2 Device (Oxygen Therapy): room air    Temp  Min: 98.6 °F (37 °C)  Max: 101.4 °F (38.6 °C)  Pulse  Min: 75  Max: 97  BP  Min: 120/58  Max: 186/94  MAP (mmHg)  Min: 79  Max: 130  Resp  Min: 11  Max: 21  SpO2  Min: 94 %  Max: 100 %    07/14 0701 - 07/15 0700  In: 2968.3 [P.O.:150; I.V.:2818.3]  Out: 1500 [Urine:1500]   Unmeasured Output  Urine Occurrence: 1  Stool Occurrence: 0  Emesis Occurrence: 0  Pad Count: 1       Physical Exam    GA: Alert, comfortable, no acute distress.   HEENT: No scleral icterus or JVD.   Pulmonary: Clear to auscultation A/L.   Cardiac: RRR S1 & S2 w/o rubs/murmurs/gallops.   Abdominal: Bowel sounds present x 4. No appreciable hepatosplenomegaly.  Skin: No jaundice, rashes, or visible lesions.  Neuro:  --GCS: E4 V5 M6  --Mental Status: AAOX4, follows all commands, clear speech  --Pupils 3->2mm, PERRL.    --Corneal reflex, gag, cough intact.  --CAMPBELL spont and against gravity, no drift in upper extremities    Medications:  Continuous    lactated ringers Last Rate: 125 mL/hr at 07/15/19 1505   Scheduled    enoxparin 40 mg Q24H   gabapentin 100 mg TID   gabapentin 200 mg QHS   lidocaine 1 patch Q24H   montelukast 10 mg Daily   niMODipine 60 mg Q4H   pantoprazole 40 mg BID   polyethylene glycol 17 g BID   senna-docusate 8.6-50 mg 1 tablet BID   PRN    acetaminophen 650 mg Q6H PRN   magnesium oxide 800 mg PRN   magnesium oxide 800 mg PRN   ondansetron 4 mg Q6H PRN   potassium chloride 10% 40 mEq PRN   potassium chloride 10% 40 mEq PRN   potassium chloride 10% 60 mEq PRN   potassium, sodium phosphates 2 packet PRN   potassium, sodium phosphates 2 packet PRN   potassium, sodium phosphates 2 packet PRN   ramelteon 8 mg Nightly PRN   simethicone 1 tablet TID PRN   sodium chloride 0.9% 10 mL PRN   tiZANidine 2 mg Nightly PRN     Today I personally reviewed pertinent medications, lines/drains/airways, imaging, laboratory results,     Diet  Diet Adult Regular (IDDSI Level 7)

## 2019-07-15 NOTE — ASSESSMENT & PLAN NOTE
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

## 2019-07-15 NOTE — PROGRESS NOTES
63 yo with hx of breast cancer presents to the ED with complaints of intractable headache after a barium CT abdomen on 7/2. CT head on 7/4 with diffuse SAH within basal cisterns and midline- NSY was consulted. CTA on 7/4 revealed small anterior communicating artery aneurysm-. She was taken to IR on 7/5 and is now s/p coiling. Patient is admitted to [facility] for close monitoring.     Patient's chart was reviewed by a stroke team provider.  Patient has daily TCDs, most recent TCD 7/15 worsening L CAROLINA territory vasospam..  There is no new imaging to review.  Pending diagnostics to follow up on include: possible cerebral angiogram, nsgy following.  For other recommendations please see our previous note completed on: 7/12/19.      There are no new recommendations at this time. Will continue to follow. Discussed patient with staff. Please contact stroke team for any questions or concerns.       HYACINTH Gregory  [facility]  Department of Vascular Neurology   [facility]

## 2019-07-15 NOTE — PROGRESS NOTES
"[facility]  Adult Nutrition  Progress Note    SUMMARY       Recommendations    Recommendation/Intervention:   Continue Regular diet order. Pt consuming small amts from trays however  bringing in outside food per pt request.     RD to monitor.    Goals: Pt to continue tolerating >85% EEN and EPN during admission.  Nutrition Goal Status: new  Communication of RD Recs: reviewed with RN    Reason for Assessment    Reason For Assessment: length of stay  Diagnosis: hemorrhage  Relevant Medical History: breast cancer with macsetomy and lumpectomy  Interdisciplinary Rounds: attended  General Information Comments: Pt transferred to ICU with concern for vasopasm. Pt reports  providing meals from cafeteria. Small intake at this time. Pt reports UBW approx 150lb with excellent intake and appetite PTA. No indications of malnutrition at this time.  Nutrition Discharge Planning: adequate po intake for optimal nutrition    Nutrition Risk Screen    Nutrition Risk Screen: no indicators present    Nutrition/Diet History    Spiritual, Cultural Beliefs, Advent Practices, Values that Affect Care: no  Factors Affecting Nutritional Intake: None identified at this time    Anthropometrics    Temp: 98.7 °F (37.1 °C)  Height Method: Stated  Height: 5' 5" (165.1 cm)  Height (inches): 65 in  Weight Method: Bed Scale  Weight: 69.9 kg (154 lb)  Weight (lb): 154 lb  Ideal Body Weight (IBW), Female: 125 lb  % Ideal Body Weight, Female (lb): 123.2 lb  BMI (Calculated): 25.7  BMI Grade: 25 - 29.9 - overweight       Lab/Procedures/Meds    Pertinent Labs Reviewed: reviewed  Pertinent Medications Reviewed: reviewed  Pertinent Medications Comments: IVF    Estimated/Assessed Needs    Weight Used For Calorie Calculations: 69.9 kg (154 lb 1.6 oz)  Energy Calorie Requirements (kcal): 1567  Energy Need Method: Payette-St Jeor(PAL 1.25)  Protein Requirements: 70-84g(1.0-1.2g/kg)  Weight Used For Protein Calculations: 69.9 kg " (154 lb 1.6 oz)  Fluid Requirements (mL): 1mL/kcal or per MD     RDA Method (mL): 1567         Nutrition Prescription Ordered    Current Diet Order: Regular    Evaluation of Received Nutrient/Fluid Intake    IV Fluid (mL): 3000  I/O: +I/O, good UOP, LBM 7/10  % Intake of Estimated Energy Needs: 75 - 100 %  % Meal Intake: 75 - 100 %    Nutrition Risk    Level of Risk/Frequency of Follow-up: (f/u 1x/week)     Assessment and Plan    No nutrition diagnosis at this time.       Monitor and Evaluation    Food and Nutrient Intake: energy intake, food and beverage intake  Food and Nutrient Adminstration: diet order  Anthropometric Measurements: weight, weight change, body mass index  Biochemical Data, Medical Tests and Procedures: electrolyte and renal panel, gastrointestinal profile, glucose/endocrine profile, inflammatory profile, lipid profile  Nutrition-Focused Physical Findings: overall appearance       Nutrition Follow-Up    RD Follow-up?: Yes

## 2019-07-15 NOTE — ASSESSMENT & PLAN NOTE
64 F who presented with severe HA, neck pain, vomiting and found to have HH2 F2 SAH secondary to ruptured ACOM aneurysm now s/p coiling 7/5:    PBD 12 PCD10    Neuro:  --Continue NCC - will remain in unit until at least PBD14      -Q1h neurochecks  --HOB@30  --Keppra 500 BID x7d (complete)  --TCDs daily x14d (AM reviewed)  --Nimotop 60q4 x 21d  --Pain control - HA relieved by steroid IV in past; will trial Fioricet  --Continue to follow clinically and notify NSGY immediately if any neurostatus change  Will require angiogram if returns into vasospasm    CVS:  ---220 mmHg (cardene ggt; hydralazine & labetalol PRN)  --Echo normal    Pulm:  --on RA    GIT/Electrolytes:  --CMP daily      -Replete electrolytes PRN  --Na goal >135    Renal:  --Strict I/Os  --Goal of euvolemia or net +1L  - MIVF    Heme/ID:  --Daily CBC      -Transfuse PRN  --Trend WBC and T    PPx:  --TEDs/SCDs  --Lovenox  --PPI    Dispo: ICU

## 2019-07-15 NOTE — PLAN OF CARE
Problem: Adult Inpatient Plan of Care  Goal: Plan of Care Review  Outcome: Ongoing (interventions implemented as appropriate)  POC reviewed with pt and spouse at 0500. Pt verbalized understanding. Questions and concerns addressed. Nimodipine admin per orders.  Fioricet admin x 1 for back pain. 2 L NC placed on pt while sleeping due to O2 sats between 90-92%. LR gtt at 125 mL/hr. Labs checked. Potassium replaced. No acute events overnight. Pt progressing toward goals. Will continue to monitor. See flowsheets for full assessment and VS info

## 2019-07-15 NOTE — SUBJECTIVE & OBJECTIVE
Interval History: PBD 12. Exam improved yesterday afternoon w/fluids; angio postponed. Tmax 101 yesterday evening, resolved. TCDs this AM WNL, comparable to day prior. Continues to c/o headache and back pain.     Medications:  Continuous Infusions:   lactated ringers 125 mL/hr at 07/15/19 0905     Scheduled Meds:   enoxparin  40 mg Subcutaneous Q24H    gabapentin  100 mg Oral TID    gabapentin  200 mg Oral QHS    montelukast  10 mg Oral Daily    niMODipine  60 mg Oral Q4H    pantoprazole  40 mg Oral BID    senna-docusate 8.6-50 mg  1 tablet Oral BID    sodium chloride 0.9% bolus  1,000 mL Intravenous Once     PRN Meds:butalbital-acetaminophen-caffeine -40 mg, magnesium oxide, magnesium oxide, ondansetron, potassium chloride 10%, potassium chloride 10%, potassium chloride 10%, potassium, sodium phosphates, potassium, sodium phosphates, potassium, sodium phosphates, ramelteon, simethicone, sodium chloride 0.9%     Review of Systems  Objective:     Weight: 69.9 kg (154 lb)  Body mass index is 25.63 kg/m².  Vital Signs (Most Recent):  Temp: 98.9 °F (37.2 °C) (07/15/19 0705)  Pulse: 79 (07/15/19 0921)  Resp: 12 (07/15/19 0921)  BP: (!) 148/69 (07/15/19 0905)  SpO2: 96 % (07/15/19 0921) Vital Signs (24h Range):  Temp:  [98.6 °F (37 °C)-101.4 °F (38.6 °C)] 98.9 °F (37.2 °C)  Pulse:  [] 79  Resp:  [12-21] 12  SpO2:  [94 %-100 %] 96 %  BP: (120-186)/() 148/69     Date 07/15/19 0700 - 07/16/19 0659   Shift 8086-6807 6440-1049 8789-3038 24 Hour Total   INTAKE   I.V.(mL/kg) 375(5.4)   375(5.4)   Shift Total(mL/kg) 375(5.4)   375(5.4)   OUTPUT   Shift Total(mL/kg)       Weight (kg) 69.9 69.9 69.9 69.9                   Female External Urinary Catheter 07/14/19 1820 (Active)   Skin no redness;no breakdown 7/15/2019  3:05 AM   Tolerance no signs/symptoms of discomfort 7/15/2019  3:05 AM   Suction Continuous suction at 60 mmHg 7/14/2019  7:05 PM   Date of last wick change 07/14/19 7/14/2019  7:05 PM    Output (mL) 600 mL 7/15/2019  6:05 AM       Neurosurgery Physical Exam   AOX3, speech fluent  PERRL, EOMI, face symm, tongue midline  CAMPBELL symm  Grossly full strength in BUE/BLE  SILT  No drift    Significant Labs:  Recent Labs   Lab 07/14/19  0621 07/15/19  0143    110    136   K 3.9 3.8    103   CO2 22* 21*   BUN 11 10   CREATININE 0.7 0.7   CALCIUM 9.3 8.7   MG 2.2 2.0     Recent Labs   Lab 07/14/19  0621 07/14/19  1829 07/15/19  0143   WBC 12.23 12.67 11.83   HGB 11.6* 11.0* 10.2*   HCT 37.4 35.4* 33.1*    299 319         Significant Diagnostics:  I have reviewed and interpreted all pertinent imaging results/findings within the past 24 hours.   Us Transcran Doppler Intracran Artr Comp    Result Date: 7/14/2019  Findings suggest left anterior cerebral artery vasospasm which has improved since yesterday Electronically signed by: Howard Silva MD Date:    07/14/2019 Time:    14:09    AM TCDs reviewed.

## 2019-07-16 LAB
ALBUMIN SERPL BCP-MCNC: 2.8 G/DL (ref 3.5–5.2)
ALP SERPL-CCNC: 108 U/L (ref 55–135)
ALT SERPL W/O P-5'-P-CCNC: 29 U/L (ref 10–44)
ANION GAP SERPL CALC-SCNC: 6 MMOL/L (ref 8–16)
AST SERPL-CCNC: 13 U/L (ref 10–40)
BASOPHILS # BLD AUTO: 0.05 K/UL (ref 0–0.2)
BASOPHILS NFR BLD: 0.5 % (ref 0–1.9)
BILIRUB SERPL-MCNC: 0.3 MG/DL (ref 0.1–1)
BUN SERPL-MCNC: 11 MG/DL (ref 8–23)
CALCIUM SERPL-MCNC: 9.3 MG/DL (ref 8.7–10.5)
CHLORIDE SERPL-SCNC: 105 MMOL/L (ref 95–110)
CO2 SERPL-SCNC: 27 MMOL/L (ref 23–29)
CREAT SERPL-MCNC: 0.7 MG/DL (ref 0.5–1.4)
DIFFERENTIAL METHOD: ABNORMAL
EOSINOPHIL # BLD AUTO: 0.2 K/UL (ref 0–0.5)
EOSINOPHIL NFR BLD: 1.8 % (ref 0–8)
ERYTHROCYTE [DISTWIDTH] IN BLOOD BY AUTOMATED COUNT: 13.6 % (ref 11.5–14.5)
EST. GFR  (AFRICAN AMERICAN): >60 ML/MIN/1.73 M^2
EST. GFR  (NON AFRICAN AMERICAN): >60 ML/MIN/1.73 M^2
GLUCOSE SERPL-MCNC: 97 MG/DL (ref 70–110)
HCT VFR BLD AUTO: 31.1 % (ref 37–48.5)
HGB BLD-MCNC: 9.5 G/DL (ref 12–16)
IMM GRANULOCYTES # BLD AUTO: 0.19 K/UL (ref 0–0.04)
IMM GRANULOCYTES NFR BLD AUTO: 2 % (ref 0–0.5)
LYMPHOCYTES # BLD AUTO: 3.9 K/UL (ref 1–4.8)
LYMPHOCYTES NFR BLD: 40.9 % (ref 18–48)
MAGNESIUM SERPL-MCNC: 2.2 MG/DL (ref 1.6–2.6)
MCH RBC QN AUTO: 27.4 PG (ref 27–31)
MCHC RBC AUTO-ENTMCNC: 30.5 G/DL (ref 32–36)
MCV RBC AUTO: 90 FL (ref 82–98)
MONOCYTES # BLD AUTO: 0.9 K/UL (ref 0.3–1)
MONOCYTES NFR BLD: 9 % (ref 4–15)
NEUTROPHILS # BLD AUTO: 4.4 K/UL (ref 1.8–7.7)
NEUTROPHILS NFR BLD: 45.8 % (ref 38–73)
NRBC BLD-RTO: 0 /100 WBC
PHOSPHATE SERPL-MCNC: 4 MG/DL (ref 2.7–4.5)
PLATELET # BLD AUTO: 298 K/UL (ref 150–350)
PMV BLD AUTO: 9.1 FL (ref 9.2–12.9)
POTASSIUM SERPL-SCNC: 4 MMOL/L (ref 3.5–5.1)
PROT SERPL-MCNC: 5.6 G/DL (ref 6–8.4)
RBC # BLD AUTO: 3.47 M/UL (ref 4–5.4)
SODIUM SERPL-SCNC: 138 MMOL/L (ref 136–145)
WBC # BLD AUTO: 9.62 K/UL (ref 3.9–12.7)

## 2019-07-16 PROCEDURE — 80053 COMPREHEN METABOLIC PANEL: CPT

## 2019-07-16 PROCEDURE — 63600175 PHARM REV CODE 636 W HCPCS: Performed by: NURSE PRACTITIONER

## 2019-07-16 PROCEDURE — 84100 ASSAY OF PHOSPHORUS: CPT

## 2019-07-16 PROCEDURE — 25000003 PHARM REV CODE 250: Performed by: PSYCHIATRY & NEUROLOGY

## 2019-07-16 PROCEDURE — 25000003 PHARM REV CODE 250: Performed by: NURSE PRACTITIONER

## 2019-07-16 PROCEDURE — 99291 PR CRITICAL CARE, E/M 30-74 MINUTES: ICD-10-PCS | Mod: ,,, | Performed by: PSYCHIATRY & NEUROLOGY

## 2019-07-16 PROCEDURE — 20000000 HC ICU ROOM

## 2019-07-16 PROCEDURE — 99291 CRITICAL CARE FIRST HOUR: CPT | Mod: ,,, | Performed by: PSYCHIATRY & NEUROLOGY

## 2019-07-16 PROCEDURE — 85025 COMPLETE CBC W/AUTO DIFF WBC: CPT

## 2019-07-16 PROCEDURE — 83735 ASSAY OF MAGNESIUM: CPT

## 2019-07-16 PROCEDURE — 94761 N-INVAS EAR/PLS OXIMETRY MLT: CPT

## 2019-07-16 PROCEDURE — 25000003 PHARM REV CODE 250: Performed by: UROLOGY

## 2019-07-16 RX ADMIN — PANTOPRAZOLE SODIUM 40 MG: 40 TABLET, DELAYED RELEASE ORAL at 09:07

## 2019-07-16 RX ADMIN — GABAPENTIN 100 MG: 100 CAPSULE ORAL at 02:07

## 2019-07-16 RX ADMIN — SENNOSIDES,DOCUSATE SODIUM 1 TABLET: 8.6; 5 TABLET, FILM COATED ORAL at 08:07

## 2019-07-16 RX ADMIN — ACETAMINOPHEN 650 MG: 325 TABLET ORAL at 05:07

## 2019-07-16 RX ADMIN — NIMODIPINE 60 MG: 30 CAPSULE, LIQUID FILLED ORAL at 09:07

## 2019-07-16 RX ADMIN — NIMODIPINE 60 MG: 30 CAPSULE, LIQUID FILLED ORAL at 02:07

## 2019-07-16 RX ADMIN — SENNOSIDES,DOCUSATE SODIUM 1 TABLET: 8.6; 5 TABLET, FILM COATED ORAL at 09:07

## 2019-07-16 RX ADMIN — NIMODIPINE 60 MG: 30 CAPSULE, LIQUID FILLED ORAL at 03:07

## 2019-07-16 RX ADMIN — PANTOPRAZOLE SODIUM 40 MG: 40 TABLET, DELAYED RELEASE ORAL at 08:07

## 2019-07-16 RX ADMIN — GABAPENTIN 100 MG: 100 CAPSULE ORAL at 08:07

## 2019-07-16 RX ADMIN — GABAPENTIN 100 MG: 100 CAPSULE ORAL at 09:07

## 2019-07-16 RX ADMIN — GABAPENTIN 200 MG: 100 CAPSULE ORAL at 09:07

## 2019-07-16 RX ADMIN — ENOXAPARIN SODIUM 40 MG: 100 INJECTION SUBCUTANEOUS at 04:07

## 2019-07-16 RX ADMIN — NIMODIPINE 60 MG: 30 CAPSULE, LIQUID FILLED ORAL at 07:07

## 2019-07-16 RX ADMIN — TIZANIDINE 2 MG: 2 TABLET ORAL at 10:07

## 2019-07-16 RX ADMIN — MONTELUKAST SODIUM 10 MG: 10 TABLET, COATED ORAL at 08:07

## 2019-07-16 RX ADMIN — ACETAMINOPHEN 650 MG: 325 TABLET ORAL at 01:07

## 2019-07-16 RX ADMIN — NIMODIPINE 60 MG: 30 CAPSULE, LIQUID FILLED ORAL at 05:07

## 2019-07-16 NOTE — PLAN OF CARE
Problem: Adult Inpatient Plan of Care  Goal: Plan of Care Review  Outcome: Ongoing (interventions implemented as appropriate)    POC reviewed with pt and spouse at 0500. Pt verbalized understanding. Questions and concerns addressed. No acute events overnight. LR gtt continued at 125 cc/hr. 2 L NC on during night to maintain O2 sats. No BM throughout shift. Pt progressing toward goals. Will continue to monitor. See flowsheets for full assessment and VS info

## 2019-07-16 NOTE — PROGRESS NOTES
[facility]  Neurosurgery  Progress Note    Subjective:     History of Present Illness: 63 yo with hx of breast cancer presents to the ED with complaints of headache after a barium CT abdomen on Tuesday. Reports intractable headache which began 2 days ago. Associated n/v, neck pain. Denies visual changes, weakness, paresthesias, b/b dysfunction. No trauma, falls, or LOC. She does not take antiplatelet therapy or anticoagulants. Currently on a cardene drip started at the outside hospital. She states she is tired from the morphine given prior to transport. Family at bedside.       Post-Op Info:  Procedure(s) (LRB):  ANGIOGRAM-CEREBRAL, acomm aneurysm (N/A)   11 Days Post-Op     Interval History: naeon    Medications:  Continuous Infusions:   lactated ringers 125 mL/hr at 07/16/19 1305     Scheduled Meds:   enoxparin  40 mg Subcutaneous Q24H    gabapentin  100 mg Oral TID    gabapentin  200 mg Oral QHS    montelukast  10 mg Oral Daily    niMODipine  60 mg Oral Q4H    pantoprazole  40 mg Oral BID    polyethylene glycol  17 g Oral BID    senna-docusate 8.6-50 mg  1 tablet Oral BID     PRN Meds:acetaminophen, magnesium oxide, magnesium oxide, ondansetron, potassium chloride 10%, potassium chloride 10%, potassium chloride 10%, potassium, sodium phosphates, potassium, sodium phosphates, potassium, sodium phosphates, simethicone, sodium chloride 0.9%, tiZANidine     Review of Systems  Objective:     Weight: 69.9 kg (154 lb)  Body mass index is 25.63 kg/m².  Vital Signs (Most Recent):  Temp: 98.1 °F (36.7 °C) (07/16/19 1105)  Pulse: 80 (07/16/19 1305)  Resp: 12 (07/16/19 1305)  BP: (!) 156/81 (07/16/19 1305)  SpO2: 99 % (07/16/19 1305) Vital Signs (24h Range):  Temp:  [98.1 °F (36.7 °C)-99.2 °F (37.3 °C)] 98.1 °F (36.7 °C)  Pulse:  [61-92] 80  Resp:  [12-26] 12  SpO2:  [93 %-100 %] 99 %  BP: (110-182)/(64-94) 156/81     Date 07/16/19 0700 - 07/17/19 0659   Shift 5147-0864 4432-8355 7941-8634 24  Hour Total   INTAKE   P.O. 500   500   I.V.(mL/kg) 875(12.5)   875(12.5)   Shift Total(mL/kg) 1375(19.7)   1375(19.7)   OUTPUT   Urine(mL/kg/hr) 1050   1050   Shift Total(mL/kg) 1050(15)   1050(15)   Weight (kg) 69.9 69.9 69.9 69.9                   Female External Urinary Catheter 07/14/19 1820 (Active)   Skin perineum cleansed w/ soap and water;no redness;no breakdown 7/16/2019 11:05 AM   Tolerance no signs/symptoms of discomfort 7/16/2019 11:05 AM   Suction Continuous suction at 70 mmHg 7/16/2019  7:05 AM   Date of last wick change 07/16/19 7/16/2019  7:05 AM   Time of last wick change 0705 7/16/2019  7:05 AM   Output (mL) 400 mL 7/16/2019 10:05 AM       Neurosurgery Physical Exam   AOX3, speech fluent  PERRL  FCX4  SILT  No drift    Significant Labs:  Recent Labs   Lab 07/15/19  0143 07/16/19  0105    97    138   K 3.8 4.0    105   CO2 21* 27   BUN 10 11   CREATININE 0.7 0.7   CALCIUM 8.7 9.3   MG 2.0 2.2     Recent Labs   Lab 07/14/19  1829 07/15/19  0143 07/16/19  0105   WBC 12.67 11.83 9.62   HGB 11.0* 10.2* 9.5*   HCT 35.4* 33.1* 31.1*    319 298     No results for input(s): LABPT, INR, APTT in the last 48 hours.  Microbiology Results (last 7 days)     ** No results found for the last 168 hours. **            Significant Diagnostics:      Assessment/Plan:     Aneurysm of anterior communicating artery  64 F who presented with severe HA, neck pain, vomiting and found to have HH2 F2 SAH secondary to ruptured ACOM aneurysm now s/p coiling 7/5, stepped up to ICU 7/14 secondary to vasospasm.    PBD 13 PCD11    Neuro:  --Continue NCC - will remain in unit until at least PBD14      -Q1h neurochecks  --HOB@30  --Keppra 500 BID x7d (complete)  --TCDs daily x14d (AM reviewed)  --Nimotop 60q4 x 21d  --Pain control - HA relieved by steroid IV in past; will trial Fioricet  --Continue to follow clinically and notify NSGY immediately if any neurostatus change  Will require angiogram if returns into  vasospasm    CVS:  ---220 mmHg (cardene ggt; hydralazine & labetalol PRN)  --Echo normal    Pulm:  --on RA    GIT/Electrolytes:  --CMP daily      -Replete electrolytes PRN  --Na goal >135    Renal:  --Strict I/Os  --Goal of euvolemia or net +1L  - MIVF    Heme/ID:  --Daily CBC      -Transfuse PRN  --Trend WBC and T    PPx:  --TEDs/SCDs  --Lovenox  --PPI    Dispo: ICU        Wilfredo Mccartney, DO  Neurosurgery  [facility]

## 2019-07-16 NOTE — PLAN OF CARE
65 yo with hx of breast cancer presents to the ED with complaints of intractable headache after a barium CT abdomen on 7/2. CT head on 7/4 with diffuse SAH within basal cisterns and midline- NSY was consulted. CTA on 7/4 revealed small anterior communicating artery aneurysm-. She was taken to IR on 7/5 and is now s/p coiling. Patient is admitted to [facility] for close monitoring.     Patient's chart was reviewed by a stroke team provider.  Patient on vasospasm watch.  There is no new imaging to review.  Pending diagnostics to follow up on include: daily TCDs.  For other recommendations please see our previous note completed on: 7/12/19.      There are no new recommendations at this time. Will continue to follow. Discussed patient with staff. Please contact stroke team for any questions or concerns.             HYACINTH Gregory  [facility]   Department of Vascular Neurology   [facility]

## 2019-07-16 NOTE — PT/OT/SLP DISCHARGE
Physical Therapy Discharge Summary    Name: Gissel Jamison  MRN: [id number]   Principal Problem: Nontraumatic subarachnoid hemorrhage from anterior communicating artery     Patient Discharged from acute Physical Therapy on 7/14/2019.  Please refer to prior PT noted date on 7/11/2019 for functional status.     Assessment:     Patient was discharged unexpectedly.  Information required to complete an accurate discharge summary is unknown.  Refer to therapy initial evaluation and last progress note for initial and most recent functional status and goal achievement.  Recommendations made may be found in medical record.    PT POC interrupted by need to return to ICU for medical management and monitoring.     Objective:     GOALS:   Multidisciplinary Problems     Physical Therapy Goals        Problem: Physical Therapy Goal    Goal Priority Disciplines Outcome Goal Variances Interventions   Physical Therapy Goal     PT, PT/OT Ongoing (interventions implemented as appropriate)     Description:    Goals to be met by 7/18/2019    1. Pt will perform rolling to the R and L with SBA. - MET  2. Pt will perform supine to sit from both sides of the bed with SBA. - MET  3. Pt will perform sit to supine with SBA. - MET  4. Pt will perform sit to stand transfers with SBA.  - MET  - Pt will perform sit to stand transfer with independence and good erect standing posture  5. Pt will perform bed <> chair transfers with SBA.  6. Pt will perform gait x 150 feet with SBA with no AD                        Reasons for Discontinuation of Therapy Services  Transfer to ICU for medical management and monitoring.       Plan:     Please re-consult therapy when the patient is appropriate for mobilization.     Cristy Craven, PT  7/16/2019

## 2019-07-16 NOTE — ASSESSMENT & PLAN NOTE
-nontraumatic SAH  -HH2 F2 SAH secondary to ruptured ACOM aneurysm now s/p coiling 7/5  -NSGY following  -daily TCDs  -Nimotop  -SBP<200  -continue IVF  -PT/OT  -neuro checks q 1 hr  -vasospasm watch  -continue ICU monitoring for 48 more hours minimum  -angio should symptoms develop

## 2019-07-16 NOTE — PROGRESS NOTES
[facility]  Neurocritical Care  Progress Note    Admit Date: 7/4/2019  Service Date: 07/16/2019  Length of Stay: 12    Subjective:     Chief Complaint: Nontraumatic subarachnoid hemorrhage from anterior communicating artery    History of Present Illness: 64 y F with medical h.o breast cancer s/p mastectomy, lumpectomy, who presented to the ER as a transfer from [facility] as SAH detected on CTH.  Patient's symptoms started on Tuesday 7/2 when she was undergoing a CT abdomen for evaluation of indigestion. She started to feel a pounding and congestion of head and ears. She did not seek medical attention till today 7/4 when she went to urgent care. Urgent care doc did not feel comfortable treating it as migraine and sent patient to ER; where CT head detected SAH. She is transferred [facility].  Here a CTA detected CAROLINA aneurysm and patient is being sent for angio and further neurosurg intervention,.    Interval history: ruptured ACOM aneurysm now s/p coiling 7/5.  [id number]  On 7/14, stepped back up to Neuro ICU for concern for vasospasm    Hospital Course: 07/05/2019 NAEO. For conventional angiogram today ACOM aneurysm on CTA  7/7/19: add lovenox for DVT proph., initiate IVF, PT/OT, liberalize BP <200  07/08/2019 headache. Mild to moderate cerebral VSP.   07/09/2019 NAEON  7/10/2019: increased night time gabapentin, add senna doc, CT head today (ordered per NSGY)-stable, continue TCDs and nimotop, dexamethasone 4 mg once PRN (for headache).   07/12/2019: severe headache overnight, resolved with steroids  7/14: stepped up to neuro ICU, concern for vasospasm. TCDs, bolus of fluids given  07/15/2019: stable exam, headache controlled with steroids  07/16/2019: no acute adverse events overnight    Interval History:  No acute adverse events overnight    Review of Systems    Constitutional: Denies fevers, weight loss, chills, or weakness.  Eyes: Denies  changes in vision.  ENT: Denies dysphagia, nasal discharge, ear pain or discharge.  Cardiovascular: Denies chest pain, palpitations, orthopnea, or claudication.  Respiratory: Denies shortness of breath, cough, hemoptysis, or wheezing.  GI: Denies nausea/vomitting, hematochezia, melena, abd pain, or changes in appetite.  : Denies dysuria, incontinence, or hematuria.  Musculoskeletal: Denies joint pain or myalgias.  Skin/breast: Denies rashes, lumps, lesions, or discharge.  Neurologic: Denies  dizziness, vertigo, or paresthesias. Complains of headache  Psychiatric: Denies changes in mood or hallucinations.  Endocrine: Denies polyuria, polydipsia, heat/cold intolerance.  Hematologic/Lymph: Denies lymphadenopathy, easy bruising or easy bleeding.  Allergic/Immunologic: Denies rash, rhinitis.   Objective:     Vitals:  Temp: 98.1 °F (36.7 °C)  Pulse: 78  Rhythm: normal sinus rhythm  BP: (!) 175/81  MAP (mmHg): 116  Resp: 13  SpO2: 99 %  O2 Device (Oxygen Therapy): room air    Temp  Min: 98.1 °F (36.7 °C)  Max: 99.2 °F (37.3 °C)  Pulse  Min: 61  Max: 92  BP  Min: 110/64  Max: 182/86  MAP (mmHg)  Min: 80  Max: 124  Resp  Min: 12  Max: 26  SpO2  Min: 93 %  Max: 100 %    07/15 0701 - 07/16 0700  In: 3450 [P.O.:450; I.V.:3000]  Out: 2815 [Urine:2815]   Unmeasured Output  Urine Occurrence: 1  Stool Occurrence: 0  Emesis Occurrence: 0  Pad Count: 1       Physical Exam    GA: Alert, comfortable, no acute distress.   HEENT: No scleral icterus or JVD.   Pulmonary: Clear to auscultation A/L.   Cardiac: RRR S1 & S2 w/o rubs/murmurs/gallops.   Abdominal: Bowel sounds present x 4. No appreciable hepatosplenomegaly.  Skin: No jaundice, rashes, or visible lesions.  Neuro:  --GCS: E4 V5 M6  --Mental Status: AAOX4, follows all commands, clear speech  --Pupils 3->2mm, PERRL.   --Corneal reflex, gag, cough intact.  --CAMPBELL spont and against gravity, no drift in upper extremities    Medications:  Continuous    lactated ringers Last Rate: 125  mL/hr at 07/16/19 1305   Scheduled    enoxparin 40 mg Q24H   gabapentin 100 mg TID   gabapentin 200 mg QHS   montelukast 10 mg Daily   niMODipine 60 mg Q4H   pantoprazole 40 mg BID   polyethylene glycol 17 g BID   senna-docusate 8.6-50 mg 1 tablet BID   PRN    acetaminophen 650 mg Q6H PRN   magnesium oxide 800 mg PRN   magnesium oxide 800 mg PRN   ondansetron 4 mg Q6H PRN   potassium chloride 10% 40 mEq PRN   potassium chloride 10% 40 mEq PRN   potassium chloride 10% 60 mEq PRN   potassium, sodium phosphates 2 packet PRN   potassium, sodium phosphates 2 packet PRN   potassium, sodium phosphates 2 packet PRN   simethicone 1 tablet TID PRN   sodium chloride 0.9% 10 mL PRN   tiZANidine 2 mg Nightly PRN     Today I personally reviewed pertinent medications, lines/drains/airways, imaging, laboratory results,     Diet  Diet Adult Regular (IDDSI Level 7)      Assessment/Plan:     Neuro  * Nontraumatic subarachnoid hemorrhage from anterior communicating artery  -nontraumatic SAH  -HH2 F2 SAH secondary to ruptured ACOM aneurysm now s/p coiling 7/5  -NSGY following  -daily TCDs  -Nimotop  -SBP<200  -continue IVF  -PT/OT  -neuro checks q 1 hr  -vasospasm watch  -continue ICU monitoring for 48 more hours minimum  -angio should symptoms develop    Headache  -gabapentin  -dexamethasone 4mg once if needed  -zanaflex at night    Cerebral vasospasm  Continue nimotop   7/14: gave bolus LR, TCD pending  07/16/2019: TCDs L CAROLINA slightly worse - correlates with previous symptoms, accurate I/O, asymptomatic      Aneurysm of anterior communicating artery  S/p coiling      Oncology  Anemia  Monitor  Transfuse if needed    GI  Fatty liver disease, nonalcoholic  Reported by patient from outside CT scan  Fatty infiltrate of liver seen on abdominal u/s    Transaminitis  Resolved  abd u/s with focal fatty infiltrate  Avoid hepatotoxic agents          The patient is being Prophylaxed for:  Venous Thromboembolism with: Mechanical or  Chemical  Stress Ulcer with: PPI  Ventilator Pneumonia with: not applicable    Activity Orders          Diet Adult Regular (IDDSI Level 7): Regular starting at 07/14 1558        Full Code    Frederic Greenberg MD  Neurocritical Care  [facility]    Critical condition in that Patient has a condition that poses threat to life and bodily function: SAH with previously symptomatic vasospasm     34 minutes of Critical care time was spent personally by me on the following activities: development of treatment plan with patient or surrogate and bedside caregivers, discussions with consultants, evaluation of patient's response to treatment, examination of patient, ordering and performing treatments and interventions, ordering and review of laboratory studies, ordering and review of radiographic studies, pulse oximetry, antibiotic titration if applicable, vasopressor titration if applicable, re-evaluation of patient's condition. This critical care time did not overlap with that of any other provider or involve time for any procedures.

## 2019-07-16 NOTE — ASSESSMENT & PLAN NOTE
64 F who presented with severe HA, neck pain, vomiting and found to have HH2 F2 SAH secondary to ruptured ACOM aneurysm now s/p coiling 7/5, stepped up to ICU 7/14 secondary to vasospasm.    PBD 13 PCD11    Neuro:  --Continue NCC - will remain in unit until at least PBD14      -Q1h neurochecks  --HOB@30  --Keppra 500 BID x7d (complete)  --TCDs daily x14d (AM reviewed)  --Nimotop 60q4 x 21d  --Pain control - HA relieved by steroid IV in past; will trial Fioricet  --Continue to follow clinically and notify NSGY immediately if any neurostatus change  Will require angiogram if returns into vasospasm    CVS:  ---220 mmHg (cardene ggt; hydralazine & labetalol PRN)  --Echo normal    Pulm:  --on RA    GIT/Electrolytes:  --CMP daily      -Replete electrolytes PRN  --Na goal >135    Renal:  --Strict I/Os  --Goal of euvolemia or net +1L  - MIVF    Heme/ID:  --Daily CBC      -Transfuse PRN  --Trend WBC and T    PPx:  --TEDs/SCDs  --Lovenox  --PPI    Dispo: ICU

## 2019-07-16 NOTE — PLAN OF CARE
Problem: Adult Inpatient Plan of Care  Goal: Plan of Care Review  Outcome: Ongoing (interventions implemented as appropriate)  POC reviewed with pt and  at 1400. Pt and  verbalized understanding. Questions and concerns addressed. No acute events today. Pt progressing toward goals. LR @ 125. OOB to chair for three hours.  Will continue to monitor. See flowsheets for full assessment and VS info.

## 2019-07-16 NOTE — SUBJECTIVE & OBJECTIVE
Interval History:  No acute adverse events overnight    Review of Systems    Constitutional: Denies fevers, weight loss, chills, or weakness.  Eyes: Denies changes in vision.  ENT: Denies dysphagia, nasal discharge, ear pain or discharge.  Cardiovascular: Denies chest pain, palpitations, orthopnea, or claudication.  Respiratory: Denies shortness of breath, cough, hemoptysis, or wheezing.  GI: Denies nausea/vomitting, hematochezia, melena, abd pain, or changes in appetite.  : Denies dysuria, incontinence, or hematuria.  Musculoskeletal: Denies joint pain or myalgias.  Skin/breast: Denies rashes, lumps, lesions, or discharge.  Neurologic: Denies  dizziness, vertigo, or paresthesias. Complains of headache  Psychiatric: Denies changes in mood or hallucinations.  Endocrine: Denies polyuria, polydipsia, heat/cold intolerance.  Hematologic/Lymph: Denies lymphadenopathy, easy bruising or easy bleeding.  Allergic/Immunologic: Denies rash, rhinitis.   Objective:     Vitals:  Temp: 98.1 °F (36.7 °C)  Pulse: 78  Rhythm: normal sinus rhythm  BP: (!) 175/81  MAP (mmHg): 116  Resp: 13  SpO2: 99 %  O2 Device (Oxygen Therapy): room air    Temp  Min: 98.1 °F (36.7 °C)  Max: 99.2 °F (37.3 °C)  Pulse  Min: 61  Max: 92  BP  Min: 110/64  Max: 182/86  MAP (mmHg)  Min: 80  Max: 124  Resp  Min: 12  Max: 26  SpO2  Min: 93 %  Max: 100 %    07/15 0701 - 07/16 0700  In: 3450 [P.O.:450; I.V.:3000]  Out: 2815 [Urine:2815]   Unmeasured Output  Urine Occurrence: 1  Stool Occurrence: 0  Emesis Occurrence: 0  Pad Count: 1       Physical Exam    GA: Alert, comfortable, no acute distress.   HEENT: No scleral icterus or JVD.   Pulmonary: Clear to auscultation A/L.   Cardiac: RRR S1 & S2 w/o rubs/murmurs/gallops.   Abdominal: Bowel sounds present x 4. No appreciable hepatosplenomegaly.  Skin: No jaundice, rashes, or visible lesions.  Neuro:  --GCS: E4 V5 M6  --Mental Status: AAOX4, follows all commands, clear speech  --Pupils 3->2mm, PERRL.   --Corneal  reflex, gag, cough intact.  --CAMPBELL spont and against gravity, no drift in upper extremities    Medications:  Continuous    lactated ringers Last Rate: 125 mL/hr at 07/16/19 1305   Scheduled    enoxparin 40 mg Q24H   gabapentin 100 mg TID   gabapentin 200 mg QHS   montelukast 10 mg Daily   niMODipine 60 mg Q4H   pantoprazole 40 mg BID   polyethylene glycol 17 g BID   senna-docusate 8.6-50 mg 1 tablet BID   PRN    acetaminophen 650 mg Q6H PRN   magnesium oxide 800 mg PRN   magnesium oxide 800 mg PRN   ondansetron 4 mg Q6H PRN   potassium chloride 10% 40 mEq PRN   potassium chloride 10% 40 mEq PRN   potassium chloride 10% 60 mEq PRN   potassium, sodium phosphates 2 packet PRN   potassium, sodium phosphates 2 packet PRN   potassium, sodium phosphates 2 packet PRN   simethicone 1 tablet TID PRN   sodium chloride 0.9% 10 mL PRN   tiZANidine 2 mg Nightly PRN     Today I personally reviewed pertinent medications, lines/drains/airways, imaging, laboratory results,     Diet  Diet Adult Regular (IDDSI Level 7)

## 2019-07-16 NOTE — SUBJECTIVE & OBJECTIVE
Interval History: naeon    Medications:  Continuous Infusions:   lactated ringers 125 mL/hr at 07/16/19 1305     Scheduled Meds:   enoxparin  40 mg Subcutaneous Q24H    gabapentin  100 mg Oral TID    gabapentin  200 mg Oral QHS    montelukast  10 mg Oral Daily    niMODipine  60 mg Oral Q4H    pantoprazole  40 mg Oral BID    polyethylene glycol  17 g Oral BID    senna-docusate 8.6-50 mg  1 tablet Oral BID     PRN Meds:acetaminophen, magnesium oxide, magnesium oxide, ondansetron, potassium chloride 10%, potassium chloride 10%, potassium chloride 10%, potassium, sodium phosphates, potassium, sodium phosphates, potassium, sodium phosphates, simethicone, sodium chloride 0.9%, tiZANidine     Review of Systems  Objective:     Weight: 69.9 kg (154 lb)  Body mass index is 25.63 kg/m².  Vital Signs (Most Recent):  Temp: 98.1 °F (36.7 °C) (07/16/19 1105)  Pulse: 80 (07/16/19 1305)  Resp: 12 (07/16/19 1305)  BP: (!) 156/81 (07/16/19 1305)  SpO2: 99 % (07/16/19 1305) Vital Signs (24h Range):  Temp:  [98.1 °F (36.7 °C)-99.2 °F (37.3 °C)] 98.1 °F (36.7 °C)  Pulse:  [61-92] 80  Resp:  [12-26] 12  SpO2:  [93 %-100 %] 99 %  BP: (110-182)/(64-94) 156/81     Date 07/16/19 0700 - 07/17/19 0659   Shift 0821-3252 4809-6220 6804-5862 24 Hour Total   INTAKE   P.O. 500   500   I.V.(mL/kg) 875(12.5)   875(12.5)   Shift Total(mL/kg) 1375(19.7)   1375(19.7)   OUTPUT   Urine(mL/kg/hr) 1050   1050   Shift Total(mL/kg) 1050(15)   1050(15)   Weight (kg) 69.9 69.9 69.9 69.9                   Female External Urinary Catheter 07/14/19 1820 (Active)   Skin perineum cleansed w/ soap and water;no redness;no breakdown 7/16/2019 11:05 AM   Tolerance no signs/symptoms of discomfort 7/16/2019 11:05 AM   Suction Continuous suction at 70 mmHg 7/16/2019  7:05 AM   Date of last wick change 07/16/19 7/16/2019  7:05 AM   Time of last wick change 0705 7/16/2019  7:05 AM   Output (mL) 400 mL 7/16/2019 10:05 AM       Neurosurgery Physical Exam   AOX3, speech  fluent  PERRL  FCX4  SILT  No drift    Significant Labs:  Recent Labs   Lab 07/15/19  0143 07/16/19  0105    97    138   K 3.8 4.0    105   CO2 21* 27   BUN 10 11   CREATININE 0.7 0.7   CALCIUM 8.7 9.3   MG 2.0 2.2     Recent Labs   Lab 07/14/19  1829 07/15/19  0143 07/16/19  0105   WBC 12.67 11.83 9.62   HGB 11.0* 10.2* 9.5*   HCT 35.4* 33.1* 31.1*    319 298     No results for input(s): LABPT, INR, APTT in the last 48 hours.  Microbiology Results (last 7 days)     ** No results found for the last 168 hours. **            Significant Diagnostics:

## 2019-07-16 NOTE — ASSESSMENT & PLAN NOTE
[facility]   [facility]: gave bolus LR, TCD pending  07/16/2019: TCDs L CAROLINA slightly worse - correlates with previous symptoms, accurate I/O, asymptomatic

## 2019-07-17 LAB
ALBUMIN SERPL BCP-MCNC: 3 G/DL (ref 3.5–5.2)
ALP SERPL-CCNC: 111 U/L (ref 55–135)
ALT SERPL W/O P-5'-P-CCNC: 25 U/L (ref 10–44)
ANION GAP SERPL CALC-SCNC: 10 MMOL/L (ref 8–16)
AST SERPL-CCNC: 13 U/L (ref 10–40)
BASOPHILS # BLD AUTO: 0.07 K/UL (ref 0–0.2)
BASOPHILS NFR BLD: 0.6 % (ref 0–1.9)
BILIRUB SERPL-MCNC: 0.2 MG/DL (ref 0.1–1)
BUN SERPL-MCNC: 12 MG/DL (ref 8–23)
CALCIUM SERPL-MCNC: 9.3 MG/DL (ref 8.7–10.5)
CHLORIDE SERPL-SCNC: 103 MMOL/L (ref 95–110)
CO2 SERPL-SCNC: 25 MMOL/L (ref 23–29)
CREAT SERPL-MCNC: 0.7 MG/DL (ref 0.5–1.4)
DIFFERENTIAL METHOD: ABNORMAL
EOSINOPHIL # BLD AUTO: 0.3 K/UL (ref 0–0.5)
EOSINOPHIL NFR BLD: 2.9 % (ref 0–8)
ERYTHROCYTE [DISTWIDTH] IN BLOOD BY AUTOMATED COUNT: 13.7 % (ref 11.5–14.5)
EST. GFR  (AFRICAN AMERICAN): >60 ML/MIN/1.73 M^2
EST. GFR  (NON AFRICAN AMERICAN): >60 ML/MIN/1.73 M^2
GLUCOSE SERPL-MCNC: 98 MG/DL (ref 70–110)
HCT VFR BLD AUTO: 33 % (ref 37–48.5)
HGB BLD-MCNC: 10.1 G/DL (ref 12–16)
IMM GRANULOCYTES # BLD AUTO: 0.12 K/UL (ref 0–0.04)
IMM GRANULOCYTES NFR BLD AUTO: 1.1 % (ref 0–0.5)
LYMPHOCYTES # BLD AUTO: 3.1 K/UL (ref 1–4.8)
LYMPHOCYTES NFR BLD: 29.1 % (ref 18–48)
MAGNESIUM SERPL-MCNC: 2.1 MG/DL (ref 1.6–2.6)
MCH RBC QN AUTO: 27 PG (ref 27–31)
MCHC RBC AUTO-ENTMCNC: 30.6 G/DL (ref 32–36)
MCV RBC AUTO: 88 FL (ref 82–98)
MONOCYTES # BLD AUTO: 0.9 K/UL (ref 0.3–1)
MONOCYTES NFR BLD: 8.2 % (ref 4–15)
NEUTROPHILS # BLD AUTO: 6.3 K/UL (ref 1.8–7.7)
NEUTROPHILS NFR BLD: 58.1 % (ref 38–73)
NRBC BLD-RTO: 0 /100 WBC
PHOSPHATE SERPL-MCNC: 4.6 MG/DL (ref 2.7–4.5)
PLATELET # BLD AUTO: 328 K/UL (ref 150–350)
PMV BLD AUTO: 9.5 FL (ref 9.2–12.9)
POTASSIUM SERPL-SCNC: 3.7 MMOL/L (ref 3.5–5.1)
PROT SERPL-MCNC: 6.2 G/DL (ref 6–8.4)
RBC # BLD AUTO: 3.74 M/UL (ref 4–5.4)
SODIUM SERPL-SCNC: 138 MMOL/L (ref 136–145)
WBC # BLD AUTO: 10.8 K/UL (ref 3.9–12.7)

## 2019-07-17 PROCEDURE — 99233 SBSQ HOSP IP/OBS HIGH 50: CPT | Mod: ,,, | Performed by: PSYCHIATRY & NEUROLOGY

## 2019-07-17 PROCEDURE — 25000003 PHARM REV CODE 250: Performed by: NURSE PRACTITIONER

## 2019-07-17 PROCEDURE — 20000000 HC ICU ROOM

## 2019-07-17 PROCEDURE — 99233 SBSQ HOSP IP/OBS HIGH 50: CPT | Mod: ,,, | Performed by: NURSE PRACTITIONER

## 2019-07-17 PROCEDURE — 97168 OT RE-EVAL EST PLAN CARE: CPT

## 2019-07-17 PROCEDURE — 25000003 PHARM REV CODE 250: Performed by: PSYCHIATRY & NEUROLOGY

## 2019-07-17 PROCEDURE — 63600175 PHARM REV CODE 636 W HCPCS: Performed by: NURSE PRACTITIONER

## 2019-07-17 PROCEDURE — 85025 COMPLETE CBC W/AUTO DIFF WBC: CPT

## 2019-07-17 PROCEDURE — 84100 ASSAY OF PHOSPHORUS: CPT

## 2019-07-17 PROCEDURE — 99233 PR SUBSEQUENT HOSPITAL CARE,LEVL III: ICD-10-PCS | Mod: ,,, | Performed by: NURSE PRACTITIONER

## 2019-07-17 PROCEDURE — 97535 SELF CARE MNGMENT TRAINING: CPT

## 2019-07-17 PROCEDURE — 25000003 PHARM REV CODE 250: Performed by: UROLOGY

## 2019-07-17 PROCEDURE — 99233 PR SUBSEQUENT HOSPITAL CARE,LEVL III: ICD-10-PCS | Mod: ,,, | Performed by: PSYCHIATRY & NEUROLOGY

## 2019-07-17 PROCEDURE — 80053 COMPREHEN METABOLIC PANEL: CPT

## 2019-07-17 PROCEDURE — 94761 N-INVAS EAR/PLS OXIMETRY MLT: CPT

## 2019-07-17 PROCEDURE — 97164 PT RE-EVAL EST PLAN CARE: CPT

## 2019-07-17 PROCEDURE — 83735 ASSAY OF MAGNESIUM: CPT

## 2019-07-17 RX ORDER — POTASSIUM CHLORIDE 20 MEQ/1
40 TABLET, EXTENDED RELEASE ORAL ONCE
Status: COMPLETED | OUTPATIENT
Start: 2019-07-17 | End: 2019-07-17

## 2019-07-17 RX ADMIN — PANTOPRAZOLE SODIUM 40 MG: 40 TABLET, DELAYED RELEASE ORAL at 09:07

## 2019-07-17 RX ADMIN — ACETAMINOPHEN 650 MG: 325 TABLET ORAL at 03:07

## 2019-07-17 RX ADMIN — NIMODIPINE 60 MG: 30 CAPSULE, LIQUID FILLED ORAL at 01:07

## 2019-07-17 RX ADMIN — NIMODIPINE 60 MG: 30 CAPSULE, LIQUID FILLED ORAL at 05:07

## 2019-07-17 RX ADMIN — TIZANIDINE 2 MG: 2 TABLET ORAL at 09:07

## 2019-07-17 RX ADMIN — NIMODIPINE 60 MG: 30 CAPSULE, LIQUID FILLED ORAL at 02:07

## 2019-07-17 RX ADMIN — GABAPENTIN 200 MG: 100 CAPSULE ORAL at 09:07

## 2019-07-17 RX ADMIN — GABAPENTIN 100 MG: 100 CAPSULE ORAL at 02:07

## 2019-07-17 RX ADMIN — MONTELUKAST SODIUM 10 MG: 10 TABLET, COATED ORAL at 08:07

## 2019-07-17 RX ADMIN — SENNOSIDES,DOCUSATE SODIUM 1 TABLET: 8.6; 5 TABLET, FILM COATED ORAL at 08:07

## 2019-07-17 RX ADMIN — GABAPENTIN 100 MG: 100 CAPSULE ORAL at 08:07

## 2019-07-17 RX ADMIN — PANTOPRAZOLE SODIUM 40 MG: 40 TABLET, DELAYED RELEASE ORAL at 08:07

## 2019-07-17 RX ADMIN — NIMODIPINE 60 MG: 30 CAPSULE, LIQUID FILLED ORAL at 06:07

## 2019-07-17 RX ADMIN — ENOXAPARIN SODIUM 40 MG: 100 INJECTION SUBCUTANEOUS at 05:07

## 2019-07-17 RX ADMIN — SENNOSIDES,DOCUSATE SODIUM 1 TABLET: 8.6; 5 TABLET, FILM COATED ORAL at 09:07

## 2019-07-17 RX ADMIN — POTASSIUM CHLORIDE 40 MEQ: 1500 TABLET, EXTENDED RELEASE ORAL at 12:07

## 2019-07-17 RX ADMIN — GABAPENTIN 100 MG: 100 CAPSULE ORAL at 09:07

## 2019-07-17 RX ADMIN — NIMODIPINE 60 MG: 30 CAPSULE, LIQUID FILLED ORAL at 09:07

## 2019-07-17 NOTE — PLAN OF CARE
Problem: Occupational Therapy Goal  Goal: Occupational Therapy Goal  Goals set 7/7 to be addressed for 14 days with expiration date, 7/21:  Patient will increase functional independence with ADLs by performing:  Patient will demonstrate stand pivot transfers with modified independence.   Not met  Patient will demonstrate grooming while standing with modified independence.   Not met  Patient will demonstrate upper body dressing with modified independence.   Not met  Patient will demonstrate lower body dressing with modified independence.   Not met  Patient will demonstrate toileting with modified independence.   Not met  Patient will demonstrate bathing while seated EOB with modified independence.   Not met       OT reassessment performed.  LARA Batres  7/17/2019

## 2019-07-17 NOTE — PT/OT/SLP RE-EVAL
Physical Therapy Re-Evaluation     Patient Name: Gissel Jamison  MRN: [id number]   Diagnosis: Nontraumatic subarachnoid hemorrhage from anterior communicating artery      Recommendations:   Discharge Recommendations:  home   Discharge Equipment Recommendations: none   Barriers to Discharge: none    Assessment:   Gissel Jamison is a 64 y.o. female admitted with a medical diagnosis of Nontraumatic subarachnoid hemorrhage from anterior communicating artery.  The patient's POC was interrupted by return to the ICU for vasospasm watch.  She presents with mobility impairments d/t back spasms which were not pronounced previously.   she now presents with the following impairments/functional limitations: impaired balance, gait instability, pain, impaired self care skills, impaired functional mobilty.   Her overall mobility is expected to improve as her clinical course improves. She is physically safe to mobilize with nursing and her  (if medically appropriate).  PT will continue to follow up with her to monitor for any mobility impairments or changes given her fluctuating clinical course.      Rehab Prognosis:  good.  The patient would benefit from acute skilled PT services to address these deficits and maximize their functional independence.  History:     Past Medical History:   Diagnosis Date    Asthma     Breast cancer     Fibromyalgia     Nontraumatic subarachnoid hemorrhage from anterior communicating artery 7/4/2019    65 yo with hx of breast cancer presents to the ED with complaints of intractable headache after a barium CT abdomen on 7/2. NSGY consulted for SAH found on CT head.  - Neurologically stable on initial assessment. HH2F2  - Stat CTA head ordered - CT head @11am reviewed. Diffuse SAH within basal cisterns and midline falx. No evidence for hydrocephalus on this study. No mass effect or midline shift.     PONV (postoperative nausea and vomiting)     for general anesthesia       Past Surgical History:  "  Procedure Laterality Date    ANGIOGRAM-CEREBRAL, acomm aneurysm N/A 7/5/2019    Performed by Turner Mckeon MD at [facility]    Arm Surgery      torn tendon    BACK SURGERY      x2    HYSTERECTOMY      RENETTA    MASTECTOMY      with reconstruction left breast    OOPHORECTOMY      RADIOFREQUENCY THERMAL COAGULATION, NERVE, SPINAL, CERVICAL, POSTERIOR RAMUS, MEDIAL BRANCH Left 2/8/2019    Performed by Raymond Britton [facility] at [facility]    Tonsillectomy      TONSILLECTOMY           Subjective   Patient comments/goals: "My experience on the other hospital floor was terrible.  I had to come back to the ICU.  If these back spasms would go away, I would feel line."  Pain/Comfort:  ·  Pain Rating 1: (back spasms)  · Pain Addressed 1: Reposition, Distraction, Cessation of Activity, Nurse notified  · Pain Rating Post-Intervention 1: (back spasms)     Objective:   The patient had blood pressure cuff, pulse ox (continuous), telemetry    General Precautions: aspiration, fall, seizure  Recent Surgery: Procedure(s) (LRB):  ANGIOGRAM-CEREBRAL, acomm aneurysm (N/A)    Recent Vital Signs:   Vitals:    07/17/19 1205   BP: (!) 177/88   Pulse: 87   Resp: (!) 22   Temp:          Physical Examination:   The patient was found supine in ICU resting with her  at bedside. She agreed to therapy.  She moved slowly with back spasms interfering with mobility.     Cognitive Function:  - Oriented to: person, place, time and situation  - Level of Alertness: awake and alert  - Follows Commands/attention: Follows multistep  commands  - Communication: clear/fluent  - Safety awareness/insight to disability: intact  Musculoskeletal System  Lower Extremities:  ROM: WNL  Strength:WNL  Posture and gross symmetry: fwd flexed posture with stiff spine and decreased volitional spinal movement d/t pain    BALANCE:  Sitting: SBA with UEs braced on bed d/t back spasms  Standing: min assistance for stability d/t back spasms in standing; LEs " flexed but not buckling    FUNCTIONAL MOBILITY ASSESSMENT:  Bed Mobility: performed with HOB flat  · Rolling/Turning R: NT  · Rolling/Turning L: SBA  · Supine > sit: SBA  · Sit > supine: NT  · Scooting EOB: SBA    Transfers:  · Sit <> stand transfer: min assistance because of back spasms   · Bed <> chair transfer: min assistance    Gait:   Gait 3 x 20 feet min to SBA with no assistive device  · Patient demonstrated antalgic gait pattern d/t back pain with spasms  · Flexed posture with high guard  · Compensative stiffening of spine, slow movements, and limited rotation d/t pain   · Knees flex during spasms but no buckling occurs  · PT facilitated: balance and safety during spasms    Therapeutic Activities, Education, or Exercises:  Therapist educated the patient and her  on the role of PT, POC, progress with mobility, goals, discharge planning, and level of assistance with transfers and Importance of progressive mobilization, out of bed positioning, and participation in therapy.  The therapist discussed the patients current mobility status, deficits, and level of assistance with RN.  Time was also provided for active listening,  therapeutic counseling and discussion of health disposition. The therapist answered questions to patient/familys satisfaction within scope of practice.   White board updated to reflect current level of assistance.     FUNCTIONAL OUTCOME MEASURES:  AMPAC:  Turning over in bed (including adjusting bedclothes, sheets and blankets)?: 4  Sitting down on and standing up from a chair with arms (e.g., wheelchair, bedside commode, etc.): 3  Moving from lying on back to sitting on the side of the bed?: 3  Moving to and from a bed to a chair (including a wheelchair)?: 3  Need to walk in hospital room?: 3  Climbing 3-5 steps with a railing?: 3  Basic Mobility Total Score: 19    Goals:     Multidisciplinary Problems     Physical Therapy Goals        Problem: Physical Therapy Goal    Goal Priority  Disciplines Outcome Goal Variances Interventions   Physical Therapy Goal     PT, PT/OT Ongoing (interventions implemented as appropriate)     Description:    Goals to be met by 7/27/2019    1. Pt will perform rolling to the R and L with SBA.   2. Pt will perform supine to sit from both sides of the bed with SBA.   3. Pt will perform sit to supine with SBA.   4. Pt will perform sit to stand transfers with SBA.    - Pt will perform sit to stand transfer with independence and good erect standing posture  5. Pt will perform bed <> chair transfers with SBA.  6. Pt will perform gait x 150 feet with SBA with no AD                         Plan:   During this hospitalization, patient will be seen 3 x/week for gait training, therapeutic activities, therapeutic exercises, neuromuscular re-education to address impairments and functional mobility deficits.   · Plan of Care Expires: 08/16/19   Plan of Care Reviewed with: patient, spouse    This plan of care has been discussed with the patient and/or family who were involved in its development and are in agreement with the identified goals and treatment plan.     Time Tracking:   PT Received On:  07/17/19  PT Start Time:   1017    PT Stop Time:  1035  PT Total Time (min): 18 min      Billable Minutes: Jim Craven, PT  7/17/2019  [phone] (pager)

## 2019-07-17 NOTE — ASSESSMENT & PLAN NOTE
64 F who presented with severe HA, neck pain, vomiting and found to have HH2 F2 SAH secondary to ruptured ACOM aneurysm now s/p coiling 7/5, stepped up to ICU 7/14 secondary to vasospasm.    PBD 14 PCD12    Neuro:  --Continue NCC - will remain in unit until at least PBD14      -Q1h neurochecks  --HOB@30  --Keppra 500 BID x7d (complete)  --TCDs daily x14d (AM reviewed)  --Nimotop 60q4 x 21d  --Pain control - HA relieved by steroid IV in past; will trial Fioricet  --Continue to follow clinically and notify NSGY immediately if any neurostatus change  Will require angiogram if returns into vasospasm    CVS:  ---220 mmHg (cardene ggt; hydralazine & labetalol PRN)  --Echo normal    Pulm:  --on RA    GIT/Electrolytes:  --CMP daily      -Replete electrolytes PRN  --Na goal >135    Renal:  --Strict I/Os  --Goal of euvolemia or net +1L  - MIVF    Heme/ID:  --Daily CBC      -Transfuse PRN  --Trend WBC and T    PPx:  --TEDs/SCDs  --Lovenox  --PPI    --PT/OT    Dispo: ICU, consider TTF today.

## 2019-07-17 NOTE — ASSESSMENT & PLAN NOTE
63 yo with hx of breast cancer presents to the ED with complaints of intractable headache after a barium CT abdomen on 7/2. CT head on 7/4 with diffuse SAH within basal cisterns and midline- NSY was consulted. CTA on 7/4 revealed small anterior communicating artery aneurysm-. She was taken to IR on 7/5 and is now s/p coiling. Patient is admitted to [facility] for close monitoring.     Mild left [place] territory vasospasm on 7/11 TCD. Worsening vasospasm on 7/15 however neuro exam improved w/o angio intervention. Slightly improving as of TCD on 7/16.     Antithrombotics: none (SAH)  Statins: none (SAH)  Aggressive risk factor modification:   Rehab efforts: PT/OT/SLP to eval and treat.   Diagnostics ordered/pending: daily TCDs  VTE prophylaxis: lovenox  BP parameters: secured aneurysm- keep SBP <200

## 2019-07-17 NOTE — PLAN OF CARE
Problem: Adult Inpatient Plan of Care  Goal: Plan of Care Review  Outcome: Ongoing (interventions implemented as appropriate)    POC reviewed with pt and pt's spouse at 0400. Pt and spouse verbalized understanding. Questions and concerns addressed. No acute events overnight. LR gtt continued at 125 cc/hr. No BM throughout shift. Potassium replaced. Pt progressing toward goals. Plan of care is to transfer pt to the floor. Will continue to monitor. See flowsheets for full assessment and VS info

## 2019-07-17 NOTE — PT/OT/SLP RE-EVAL
"Occupational Therapy   Re-evaluation    Name: Gissel Jamison  MRN: [id number]  Admitting Diagnosis:  Nontraumatic subarachnoid hemorrhage from anterior communicating artery 12 Days Post-Op    Recommendations:     Discharge Recommendations: home  Discharge Equipment Recommendations:  none  Barriers to discharge:  None    Assessment:     Gissel Jamison is a 64 y.o. female with a medical diagnosis of Nontraumatic subarachnoid hemorrhage from anterior communicating artery.  She presents with performance deficits affecting function are weakness, impaired endurance, impaired sensation, impaired self care skills, impaired functional mobilty, gait instability, impaired balance, impaired cognition, decreased coordination, decreased upper extremity function, decreased lower extremity function, decreased safety awareness, abnormal tone, decreased ROM, impaired coordination.      Rehab Prognosis:  Good; patient would benefit from acute skilled OT services to address these deficits and reach maximum level of function.       Plan:     Patient to be seen 3 x/week to address the above listed problems via self-care/home management, therapeutic activities, therapeutic exercises, cognitive retraining, sensory integration  · Plan of Care Expires: 08/04/19  · Plan of Care Reviewed with: patient, spouse    Subjective     Patient:  "My experience on the other floor was terrible."  "The back spasms limit my mobility."    Communicated with: Nurse prior to session.  Pain/Comfort:  · Pain Rating 1: 8/10  · Location 1: (back spasms)  · Pain Addressed 1: Distraction, Nurse notified  · Pain Rating Post-Intervention 1: 8/10    Objective:     Communicated with: Nurse prior to session.  Patient found supine with: peripheral IV, pulse ox (continuous), PureWick, oxygen, telemetry, bed alarm upon OT entry to room.    General Precautions: Standard, aspiration, fall, seizure   Orthopedic Precautions:N/A   Braces: N/A     Occupational Performance:    Bed " Mobility:    · Patient completed Rolling/Turning to Left with  supervision  · Patient completed Rolling/Turning to Right with supervision  · Patient completed Scooting/Bridging with supervision  · Patient completed Supine to Sit with stand by assistance  · Patient completed Sit to Supine with stand by assistance    Functional Mobility/Transfers:  · Patient completed Sit <> Stand Transfer with contact guard assistance  with  no assistive device   · Patient completed Bed <> Chair Transfer using Stand Pivot technique with contact guard assistance with no assistive device    Activities of Daily Living:  · Grooming: contact guard assistance while standing  · Upper Body Dressing: contact guard assistance while standing to gather clothing  · Lower Body Dressing: contact guard assistance      Cognitive/Visual Perceptual:  Cognitive/Psychosocial Skills:     -       Oriented to: Person, Place, Time and Situation   -       Follows Commands/attention:Follows one-step commands  -       Communication: clear/fluent  -       Memory: No Deficits noted  -       Safety awareness/insight to disability: impaired   -       Mood/Affect/Coping skills/emotional control: Appropriate to situation    Physical Exam:  Postural examination/scapula alignment:    -       Rounded shoulders  Skin integrity: Visible skin intact  Edema:  None noted  Sensation:    -       Intact  Upper Extremity Range of Motion:     -       Right Upper Extremity: WNL  -       Left Upper Extremity: WNL  Upper Extremity Strength:    -       Right Upper Extremity: WNL  -       Left Upper Extremity: WNL    AMPAC 6 Click:  AMPAC Total Score: 19    Treatment & Education:  Patient education provided on role of OT. Continued education, patient/ family training recommended.  Patient alert and oriented x 3; able to follow 4/4 one step commands.  Patient attentive and interactive throughout the session.  Patient's functional status and disposition recommendation discussed with  stroke team in daily rounds.  White board updated in patient's room.  OT asked if there were any other questions; patient/ family had no further questions.   Education:    Patient left supine with all lines intact, call button in reach and bed alarm on    GOALS:   Multidisciplinary Problems     Occupational Therapy Goals        Problem: Occupational Therapy Goal    Goal Priority Disciplines Outcome Interventions   Occupational Therapy Goal     OT, PT/OT     Description:  Goals set 7/16 to be addressed for 7 days with expiration date, 7/23:  Patient will increase functional independence with ADLs by performing:  Patient will demonstrate stand pivot transfers with modified independence.   Not met  Patient will demonstrate grooming while standing with modified independence.   Not met  Patient will demonstrate upper body dressing with modified independence.   Not met  Patient will demonstrate lower body dressing with modified independence.   Not met  Patient will demonstrate toileting with modified independence.   Not met  Patient will demonstrate bathing while seated EOB with modified independence.   Not met                         History:     Past Medical History:   Diagnosis Date    Asthma     Breast cancer     Fibromyalgia     Nontraumatic subarachnoid hemorrhage from anterior communicating artery 7/4/2019    63 yo with hx of breast cancer presents to the ED with complaints of intractable headache after a barium CT abdomen on 7/2. NSGY consulted for SAH found on CT head.  - Neurologically stable on initial assessment. HH2F2  - Stat CTA head ordered - CT head @11am reviewed. Diffuse SAH within basal cisterns and midline falx. No evidence for hydrocephalus on this study. No mass effect or midline shift.     PONV (postoperative nausea and vomiting)     for general anesthesia       Past Surgical History:   Procedure Laterality Date    ANGIOGRAM-CEREBRAL, acomm aneurysm N/A 7/5/2019    Performed by Turner PERSON  MD Mike at [facility]    Arm Surgery      torn tendon    BACK SURGERY      x2    HYSTERECTOMY      RENETTA    MASTECTOMY      with reconstruction left breast    OOPHORECTOMY      RADIOFREQUENCY THERMAL COAGULATION, NERVE, SPINAL, CERVICAL, POSTERIOR RAMUS, MEDIAL BRANCH Left 2/8/2019    Performed by Raymond Britton MD at [facility] [place]    Tonsillectomy      TONSILLECTOMY         Time Tracking:     OT Date of Treatment: 07/17/19  OT Start Time: 0630  OT Stop Time: 0710  OT Total Time (min): 40 min    Billable Minutes:Re-eval 20  Self Care/Home Management 20    LARA Batres  7/17/2019

## 2019-07-17 NOTE — SUBJECTIVE & OBJECTIVE
Neurologic Chief Complaint: Headache, SAH    Subjective:     Interval History: Patient is seen for follow-up neurological assessment and treatment recommendations: ALEXIS. Vasospasm watch continued; TCD on 7/16 shows mild - moderate L [place] vasospasm, slightly improved from 7/15. Headaches improving.      HPI, Past Medical, Family, and Social History remains the same as documented in the initial encounter.     Review of Systems   Constitutional: Negative for fever.   HENT: Negative for trouble swallowing and voice change.    Eyes: Negative for visual disturbance.   Respiratory: Negative for shortness of breath.    Cardiovascular: Negative for palpitations.   Gastrointestinal: Negative for nausea.   Musculoskeletal: Positive for back pain (spastic pain). Negative for neck pain and neck stiffness.   Neurological: Negative for dizziness, tremors, seizures, facial asymmetry, speech difficulty, weakness, numbness and headaches.   Psychiatric/Behavioral: Negative for confusion.     Scheduled Meds:   enoxparin  40 mg Subcutaneous Q24H    gabapentin  100 mg Oral TID    gabapentin  200 mg Oral QHS    montelukast  10 mg Oral Daily    niMODipine  60 mg Oral Q4H    pantoprazole  40 mg Oral BID    polyethylene glycol  17 g Oral BID    senna-docusate 8.6-50 mg  1 tablet Oral BID     Continuous Infusions:   lactated ringers 125 mL/hr at 07/17/19 1705     PRN Meds:acetaminophen, magnesium oxide, magnesium oxide, ondansetron, potassium chloride 10%, potassium chloride 10%, potassium chloride 10%, potassium, sodium phosphates, potassium, sodium phosphates, potassium, sodium phosphates, simethicone, sodium chloride 0.9%, tiZANidine    Objective:     Vital Signs (Most Recent):  Temp: 98.3 °F (36.8 °C) (07/17/19 1505)  Pulse: 81 (07/17/19 1705)  Resp: 11 (07/17/19 1705)  BP: (!) 145/67 (07/17/19 1705)  SpO2: (!) 94 % (07/17/19 1705)  BP Location: Right arm    Vital Signs Range (Last 24H):  Temp:  [98.3 °F (36.8 °C)-99.5 °F (37.5  °C)]   Pulse:  []   Resp:  [11-26]   BP: (120-192)/(55-99)   SpO2:  [93 %-100 %]   BP Location: Right arm    Physical Exam   Constitutional: She is oriented to person, place, and time.   Neck: Normal range of motion. Neck supple.   Cardiovascular: Normal rate.   Pulmonary/Chest: Effort normal.   Musculoskeletal: Normal range of motion.   Neurological: She is alert and oriented to person, place, and time.   Skin: Capillary refill takes less than 2 seconds.   Nursing note and vitals reviewed.      Neurological Exam:   LOC: alert  Attention Span: Good   Language: No aphasia  Articulation: No dysarthria  Orientation: Person, Place, Time   Visual Fields: Full  EOM (CN III, IV, VI): Full/intact  Pupils (CN II, III): PERRL  Facial Sensation (CN V): Normal  Facial Movement (CN VII): Symmetric facial expression    Motor: Arm left  Normal 5/5  Leg left  Normal 5/5  Arm right  Normal 5/5  Leg right Normal 5/5  Cebellar: No evidence of appendicular or axial ataxia  Sensation: Intact to light touch, temperature and vibration      Diagnostic Results     Brain Imaging   CTA head 7/14/19  In this patient status post A-comm coiling, there is redemonstration of hypoplastic right A1 segment, with subtle narrowing of the proximal left A2 segment which could reflect focal vasospasm.    CT head 7/11/19  Subarachnoid hemorrhage almost completely resolved.  Interval coiling of a A-comm aneurysm.  No vasospasm related complication seen.     CT head w/o contrast 07/04/2019  1. Subarachnoid hemorrhage.  In the setting of no trauma suspect aneurysm    Vessel Imaging      IR angio 07/05/2019  1. 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    2.  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    3.  Distal embolism likely from the access catheter into the distal branches of the anterior middle cerebral artery on the left side treated successfully with  intra-arterial Integrilin     CTA head 07/04/2019  Small anterior communicating artery aneurysm likely the origin of the recent subarachnoid bleed.    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 The estimated ejection fraction is 63%  · Concentric left ventricular remodeling.  · No wall motion abnormalities.  · Normal LV diastolic function.  · Normal right ventricular systolic function.  · Mild tricuspid regurgitation.  · Normal central venous pressure (3 mm Hg).  · The estimated PA systolic pressure is 22 mm Hg

## 2019-07-17 NOTE — PROGRESS NOTES
[facility]  Vascular Neurology  Comprehensive Stroke Center  Progress Note    Assessment/Plan:     * Nontraumatic subarachnoid hemorrhage from anterior communicating artery  65 yo with hx of breast cancer presents to the ED with complaints of intractable headache after a barium CT abdomen on 7/2. CT head on 7/4 with diffuse SAH within basal cisterns and midline- NSY was consulted. CTA on 7/4 revealed small anterior communicating artery aneurysm-. She was taken to IR on 7/5 and is now s/p coiling. Patient is admitted to [facility] for close monitoring.     Mild left CAROLINA territory vasospasm on 7/11 TCD. Worsening vasospasm on 7/15 however neuro exam improved w/o angio intervention. Slightly improving as of TCD on 7/16.     Antithrombotics: none (SAH)  Statins: none (SAH)  Aggressive risk factor modification:   Rehab efforts: PT/OT/SLP to eval and treat.   Diagnostics ordered/pending: daily TCDs  VTE prophylaxis: lovenox  BP parameters: secured aneurysm- keep SBP <200    Headache  Improving   Management per primary    Aneurysm of anterior communicating artery  S/p coiling- done on 7/4 7/6/19: NAEON. Patient now s/p coiling. Patient reporting improvement in headaches.   7/719: Neuro exam stable. Patient c/o difficulty sleeping and headache overnight. TCDs show increased velocities. Patient to continue PRN tylenol for HA and start ramelteon for sleep tonight.   07/08/2019 neuro exam states stable, complains of some non-constant headache worsening with moving. Some pain with lateral gaze.   07/11/2019 repeat HCT shows resolved SAH, no vasospasm, no hydrocephalus.  7/12: Neuro exam normal. No headache this AM. Patient states headache was severe yesterday afternoon. TCD from today pending.   7/17: NAEON. Vasospasm watch continued; TCD on 7/16 shows mild - moderate L CAROLINA vasospasm, slightly improved from 7/15. Headaches improving per patient.     STROKE DOCUMENTATION   Acute Stroke Times   Last Known  Normal Date: 07/03/19  Last Known Normal Time: (unable to determine)  Symptom Onset Date: 07/03/19  Symptom Onset Time: (unclear)  Stroke Team Called Date: 07/05/19  Stroke Team Called Time: 0820  Stroke Team Arrival Date: 07/05/19  Stroke Team Arrival Time: 0825  CT Interpretation Time: 0825  Decision to Treat Time for Alteplase: (No iv alteplase candidate)  Decision to Treat Time for IR: 0825    NIH Scale:  1a. Level of Consciousness: 0-->Alert, keenly responsive  1b. LOC Questions: 0-->Answers both questions correctly  1c. LOC Commands: 0-->Performs both tasks correctly  2. Best Gaze: 0-->Normal  3. Visual: 0-->No visual loss  4. Facial Palsy: 0-->Normal symmetrical movements  5a. Motor Arm, Left: 0-->No drift, limb holds 90 (or 45) degrees for full 10 secs  5b. Motor Arm, Right: 0-->No drift, limb holds 90 (or 45) degrees for full 10 secs  6a. Motor Leg, Left: 0-->No drift, leg holds 30 degree position for full 5 secs  6b. Motor Leg, Right: 0-->No drift, leg holds 30 degree position for full 5 secs  7. Limb Ataxia: 0-->Absent  8. Sensory: 0-->Normal, no sensory loss  9. Best Language: 0-->No aphasia, normal  10. Dysarthria: 0-->Normal  11. Extinction and Inattention (formerly Neglect): 0-->No abnormality  Total (NIH Stroke Scale): 0       Modified [place] Score: 0  Dino Coma Scale:    ABCD2 Score:    [id number] Score:   HAS -BLED Score:   ICH Score:0  Hunt & Rucker Classification:Grade I     Hemorrhagic change of an Ischemic Stroke: Does this patient have an ischemic stroke with hemorrhagic changes? No     Neurologic Chief Complaint: Headache, SAH    Subjective:     Interval History: Patient is seen for follow-up neurological assessment and treatment recommendations: ALEXIS. Vasospasm watch continued; TCD on 7/16 shows mild - moderate L CAROLINA vasospasm, slightly improved from 7/15. Headaches improving.      HPI, Past Medical, Family, and Social History remains the same as documented in the initial encounter.      Review of Systems   Constitutional: Negative for fever.   HENT: Negative for trouble swallowing and voice change.    Eyes: Negative for visual disturbance.   Respiratory: Negative for shortness of breath.    Cardiovascular: Negative for palpitations.   Gastrointestinal: Negative for nausea.   Musculoskeletal: Positive for back pain (spastic pain). Negative for neck pain and neck stiffness.   Neurological: Negative for dizziness, tremors, seizures, facial asymmetry, speech difficulty, weakness, numbness and headaches.   Psychiatric/Behavioral: Negative for confusion.     Scheduled Meds:   enoxparin  40 mg Subcutaneous Q24H    gabapentin  100 mg Oral TID    gabapentin  200 mg Oral QHS    montelukast  10 mg Oral Daily    niMODipine  60 mg Oral Q4H    pantoprazole  40 mg Oral BID    polyethylene glycol  17 g Oral BID    senna-docusate 8.6-50 mg  1 tablet Oral BID     Continuous Infusions:   lactated ringers 125 mL/hr at 07/17/19 1705     PRN Meds:acetaminophen, magnesium oxide, magnesium oxide, ondansetron, potassium chloride 10%, potassium chloride 10%, potassium chloride 10%, potassium, sodium phosphates, potassium, sodium phosphates, potassium, sodium phosphates, simethicone, sodium chloride 0.9%, tiZANidine    Objective:     Vital Signs (Most Recent):  Temp: 98.3 °F (36.8 °C) (07/17/19 1505)  Pulse: 81 (07/17/19 1705)  Resp: 11 (07/17/19 1705)  BP: (!) 145/67 (07/17/19 1705)  SpO2: (!) 94 % (07/17/19 1705)  BP Location: Right arm    Vital Signs Range (Last 24H):  Temp:  [98.3 °F (36.8 °C)-99.5 °F (37.5 °C)]   Pulse:  []   Resp:  [11-26]   BP: (120-192)/(55-99)   SpO2:  [93 %-100 %]   BP Location: Right arm    Physical Exam   Constitutional: She is oriented to person, place, and time.   Neck: Normal range of motion. Neck supple.   Cardiovascular: Normal rate.   Pulmonary/Chest: Effort normal.   Musculoskeletal: Normal range of motion.   Neurological: She is alert and oriented to person, place, and  time.   Skin: Capillary refill takes less than 2 seconds.   Nursing note and vitals reviewed.      Neurological Exam:   LOC: alert  Attention Span: Good   Language: No aphasia  Articulation: No dysarthria  Orientation: Person, Place, Time   Visual Fields: Full  EOM (CN III, IV, VI): Full/intact  Pupils (CN II, III): PERRL  Facial Sensation (CN V): Normal  Facial Movement (CN VII): Symmetric facial expression    Motor: Arm left  Normal 5/5  Leg left  Normal 5/5  Arm right  Normal 5/5  Leg right Normal 5/5  Cebellar: No evidence of appendicular or axial ataxia  Sensation: Intact to light touch, temperature and vibration      Diagnostic Results     Brain Imaging   CTA head 7/14/19  In this patient status post A-comm coiling, there is redemonstration of hypoplastic right A1 segment, with subtle narrowing of the proximal left A2 segment which could reflect focal vasospasm.    CT head 7/11/19  Subarachnoid hemorrhage almost completely resolved.  Interval coiling of a A-comm aneurysm.  No vasospasm related complication seen.     CT head w/o contrast 07/04/2019  1. Subarachnoid hemorrhage.  In the setting of no trauma suspect aneurysm    Vessel Imaging      IR angio 07/05/2019  1. 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    2.  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    3.  Distal embolism likely from the access catheter into the distal branches of the anterior middle cerebral artery on the left side treated successfully with intra-arterial Integrilin     CTA head 07/04/2019  Small anterior communicating artery aneurysm likely the origin of the recent subarachnoid bleed.    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 The estimated ejection fraction is 63%  · Concentric left ventricular remodeling.  · No wall motion  abnormalities.  · Normal LV diastolic function.  · Normal right ventricular systolic function.  · Mild tricuspid regurgitation.  · Normal central venous pressure (3 mm Hg).  · The estimated PA systolic pressure is 22 mm Hg         Enrrique Mullins NP  [facility]  Department of Vascular Neurology   [facility]

## 2019-07-17 NOTE — PROGRESS NOTES
[facility]  Neurosurgery  Progress Note    Subjective:     History of Present Illness: 63 yo with hx of breast cancer presents to the ED with complaints of headache after a barium CT abdomen on Tuesday. Reports intractable headache which began 2 days ago. Associated n/v, neck pain. Denies visual changes, weakness, paresthesias, b/b dysfunction. No trauma, falls, or LOC. She does not take antiplatelet therapy or anticoagulants. Currently on a cardene drip started at the outside hospital. She states she is tired from the morphine given prior to transport. Family at bedside.       Post-Op Info:  Procedure(s) (LRB):  ANGIOGRAM-CEREBRAL, acomm aneurysm (N/A)   12 Days Post-Op     Interval History: NAEON. HA improving, good relief w/nightly tizanidine.     Medications:  Continuous Infusions:   lactated ringers 125 mL/hr at 07/17/19 0705     Scheduled Meds:   enoxparin  40 mg Subcutaneous Q24H    gabapentin  100 mg Oral TID    gabapentin  200 mg Oral QHS    montelukast  10 mg Oral Daily    niMODipine  60 mg Oral Q4H    pantoprazole  40 mg Oral BID    polyethylene glycol  17 g Oral BID    senna-docusate 8.6-50 mg  1 tablet Oral BID     PRN Meds:acetaminophen, magnesium oxide, magnesium oxide, ondansetron, potassium chloride 10%, potassium chloride 10%, potassium chloride 10%, potassium, sodium phosphates, potassium, sodium phosphates, potassium, sodium phosphates, simethicone, sodium chloride 0.9%, tiZANidine     Review of Systems  Objective:     Weight: 69.9 kg (154 lb)  Body mass index is 25.63 kg/m².  Vital Signs (Most Recent):  Temp: 98.5 °F (36.9 °C) (07/17/19 0705)  Pulse: 104 (07/17/19 0705)  Resp: (!) 26 (07/17/19 0705)  BP: (!) 178/84 (07/17/19 0705)  SpO2: 96 % (07/17/19 0705) Vital Signs (24h Range):  Temp:  [98.1 °F (36.7 °C)-99.5 °F (37.5 °C)] 98.5 °F (36.9 °C)  Pulse:  [] 104  Resp:  [12-26] 26  SpO2:  [93 %-100 %] 96 %  BP: (120-192)/(55-99) 178/84     Date 07/17/19 0700 -  07/18/19 0659   Shift 4084-4629 8837-5651 7898-2275 24 Hour Total   INTAKE   I.V.(mL/kg) 125(1.8)   125(1.8)   Shift Total(mL/kg) 125(1.8)   125(1.8)   OUTPUT   Urine(mL/kg/hr) 600   600   Shift Total(mL/kg) 600(8.6)   600(8.6)   Weight (kg) 69.9 69.9 69.9 69.9                   Female External Urinary Catheter 07/14/19 1820 (Active)   Skin no redness;no breakdown 7/17/2019  3:05 AM   Tolerance no signs/symptoms of discomfort 7/17/2019  3:05 AM   Suction Continuous suction at 70 mmHg 7/17/2019  3:05 AM   Date of last wick change 07/17/19 7/17/2019  3:05 AM   Time of last wick change 0205 7/17/2019  3:05 AM   Output (mL) 600 mL 7/17/2019  7:05 AM       Neurosurgery Physical Exam   AOX3, speech fluent  PERRL  FCX4  SILT  No drift    Significant Labs:  Recent Labs   Lab 07/16/19  0105 07/17/19  0211   GLU 97 98    138   K 4.0 3.7    103   CO2 27 25   BUN 11 12   CREATININE 0.7 0.7   CALCIUM 9.3 9.3   MG 2.2 2.1     Recent Labs   Lab 07/16/19  0105 07/17/19  0211   WBC 9.62 10.80   HGB 9.5* 10.1*   HCT 31.1* 33.0*    328         Significant Diagnostics:  None    Assessment/Plan:     Aneurysm of anterior communicating artery  64 F who presented with severe HA, neck pain, vomiting and found to have HH2 F2 SAH secondary to ruptured ACOM aneurysm now s/p coiling 7/5, stepped up to ICU 7/14 secondary to vasospasm.    PBD 14 PCD12    Neuro:  --Continue NCC - will remain in unit until at least PBD14      -Q1h neurochecks  --HOB@30  --Keppra 500 BID x7d (complete)  --TCDs daily x14d (AM reviewed)  --Nimotop 60q4 x 21d  --Pain control - HA relieved by steroid IV in past; will trial Fioricet  --Continue to follow clinically and notify NSGY immediately if any neurostatus change  Will require angiogram if returns into vasospasm    CVS:  ---220 mmHg (cardene ggt; hydralazine & labetalol PRN)  --Echo normal    Pulm:  --on RA    GIT/Electrolytes:  --CMP daily      -Replete electrolytes PRN  --Na goal  >135    Renal:  --Strict I/Os  --Goal of euvolemia or net +1L  - MIVF    Heme/ID:  --Daily CBC      -Transfuse PRN  --Trend WBC and T    PPx:  --TEDs/SCDs  --Lovenox  --PPI    --PT/OT    Dispo: ICU, TTF tomorrow.         Lizbeth Packer MD  Neurosurgery  [facility]

## 2019-07-17 NOTE — PLAN OF CARE
Problem: Physical Therapy Goal  Goal: Physical Therapy Goal    Goals to be met by 7/27/2019    1. Pt will perform rolling to the R and L with SBA.   2. Pt will perform supine to sit from both sides of the bed with SBA.   3. Pt will perform sit to supine with SBA.   4. Pt will perform sit to stand transfers with SBA.    - Pt will perform sit to stand transfer with independence and good erect standing posture  5. Pt will perform bed <> chair transfers with SBA.  6. Pt will perform gait x 150 feet with SBA with no AD       Outcome: Ongoing (interventions implemented as appropriate)  Re- eval completed.  Results, POC, and therapy recommendations discussed with patient and her .   Complete evaluation documentation to follow.    Mobility Recommendations: walk with 1 person assist  D/c recommendations: home     Cristy Craven, PT  7/17/2019  [phone] (pager)

## 2019-07-17 NOTE — SUBJECTIVE & OBJECTIVE
Interval History: NAEON. HA improving, good relief w/nightly tizanidine.     Medications:  Continuous Infusions:   lactated ringers 125 mL/hr at 07/17/19 0705     Scheduled Meds:   enoxparin  40 mg Subcutaneous Q24H    gabapentin  100 mg Oral TID    gabapentin  200 mg Oral QHS    montelukast  10 mg Oral Daily    niMODipine  60 mg Oral Q4H    pantoprazole  40 mg Oral BID    polyethylene glycol  17 g Oral BID    senna-docusate 8.6-50 mg  1 tablet Oral BID     PRN Meds:acetaminophen, magnesium oxide, magnesium oxide, ondansetron, potassium chloride 10%, potassium chloride 10%, potassium chloride 10%, potassium, sodium phosphates, potassium, sodium phosphates, potassium, sodium phosphates, simethicone, sodium chloride 0.9%, tiZANidine     Review of Systems  Objective:     Weight: 69.9 kg (154 lb)  Body mass index is 25.63 kg/m².  Vital Signs (Most Recent):  Temp: 98.5 °F (36.9 °C) (07/17/19 0705)  Pulse: 104 (07/17/19 0705)  Resp: (!) 26 (07/17/19 0705)  BP: (!) 178/84 (07/17/19 0705)  SpO2: 96 % (07/17/19 0705) Vital Signs (24h Range):  Temp:  [98.1 °F (36.7 °C)-99.5 °F (37.5 °C)] 98.5 °F (36.9 °C)  Pulse:  [] 104  Resp:  [12-26] 26  SpO2:  [93 %-100 %] 96 %  BP: (120-192)/(55-99) 178/84     Date 07/17/19 0700 - 07/18/19 0659   Shift 6635-8754 0941-0566 1413-5105 24 Hour Total   INTAKE   I.V.(mL/kg) 125(1.8)   125(1.8)   Shift Total(mL/kg) 125(1.8)   125(1.8)   OUTPUT   Urine(mL/kg/hr) 600   600   Shift Total(mL/kg) 600(8.6)   600(8.6)   Weight (kg) 69.9 69.9 69.9 69.9                   Female External Urinary Catheter 07/14/19 1820 (Active)   Skin no redness;no breakdown 7/17/2019  3:05 AM   Tolerance no signs/symptoms of discomfort 7/17/2019  3:05 AM   Suction Continuous suction at 70 mmHg 7/17/2019  3:05 AM   Date of last wick change 07/17/19 7/17/2019  3:05 AM   Time of last wick change 0205 7/17/2019  3:05 AM   Output (mL) 600 mL 7/17/2019  7:05 AM       Neurosurgery Physical Exam   AOX3, speech  fluent  PERRL  FCX4  SILT  No drift    Significant Labs:  Recent Labs   Lab 07/16/19  0105 07/17/19 0211   GLU 97 98    138   K 4.0 3.7    103   CO2 27 25   BUN 11 12   CREATININE 0.7 0.7   CALCIUM 9.3 9.3   MG 2.2 2.1     Recent Labs   Lab 07/16/19 0105 07/17/19 0211   WBC 9.62 10.80   HGB 9.5* 10.1*   HCT 31.1* 33.0*    328         Significant Diagnostics:  None

## 2019-07-17 NOTE — PLAN OF CARE
Problem: Adult Inpatient Plan of Care  Goal: Plan of Care Review  Outcome: Ongoing (interventions implemented as appropriate)  POC reviewed with pt and  at 1400. Pt and  verbalized understanding. Questions and concerns addressed. No acute events today. LR @ 125. Walked around unit on portable monitor. BMx1.  Pt progressing toward goals. Will continue to monitor. See flowsheets for full assessment and VS info.

## 2019-07-18 VITALS
BODY MASS INDEX: 25.66 KG/M2 | TEMPERATURE: 98 F | WEIGHT: 154 LBS | SYSTOLIC BLOOD PRESSURE: 127 MMHG | HEART RATE: 85 BPM | OXYGEN SATURATION: 96 % | DIASTOLIC BLOOD PRESSURE: 73 MMHG | RESPIRATION RATE: 15 BRPM | HEIGHT: 65 IN

## 2019-07-18 PROBLEM — G93.6 VASOGENIC CEREBRAL EDEMA: Status: ACTIVE | Noted: 2019-07-18

## 2019-07-18 LAB
ALBUMIN SERPL BCP-MCNC: 3.1 G/DL (ref 3.5–5.2)
ALP SERPL-CCNC: 112 U/L (ref 55–135)
ALT SERPL W/O P-5'-P-CCNC: 25 U/L (ref 10–44)
ANION GAP SERPL CALC-SCNC: 10 MMOL/L (ref 8–16)
AST SERPL-CCNC: 21 U/L (ref 10–40)
BASOPHILS # BLD AUTO: 0.06 K/UL (ref 0–0.2)
BASOPHILS NFR BLD: 0.6 % (ref 0–1.9)
BILIRUB SERPL-MCNC: 0.2 MG/DL (ref 0.1–1)
BUN SERPL-MCNC: 11 MG/DL (ref 8–23)
CALCIUM SERPL-MCNC: 9.5 MG/DL (ref 8.7–10.5)
CHLORIDE SERPL-SCNC: 105 MMOL/L (ref 95–110)
CO2 SERPL-SCNC: 24 MMOL/L (ref 23–29)
CREAT SERPL-MCNC: 0.7 MG/DL (ref 0.5–1.4)
DIFFERENTIAL METHOD: ABNORMAL
EOSINOPHIL # BLD AUTO: 0.3 K/UL (ref 0–0.5)
EOSINOPHIL NFR BLD: 2.6 % (ref 0–8)
ERYTHROCYTE [DISTWIDTH] IN BLOOD BY AUTOMATED COUNT: 13.8 % (ref 11.5–14.5)
EST. GFR  (AFRICAN AMERICAN): >60 ML/MIN/1.73 M^2
EST. GFR  (NON AFRICAN AMERICAN): >60 ML/MIN/1.73 M^2
GLUCOSE SERPL-MCNC: 115 MG/DL (ref 70–110)
HCT VFR BLD AUTO: 32.4 % (ref 37–48.5)
HGB BLD-MCNC: 10.1 G/DL (ref 12–16)
IMM GRANULOCYTES # BLD AUTO: 0.11 K/UL (ref 0–0.04)
IMM GRANULOCYTES NFR BLD AUTO: 1.2 % (ref 0–0.5)
LYMPHOCYTES # BLD AUTO: 2.7 K/UL (ref 1–4.8)
LYMPHOCYTES NFR BLD: 28 % (ref 18–48)
MAGNESIUM SERPL-MCNC: 2.2 MG/DL (ref 1.6–2.6)
MCH RBC QN AUTO: 27.4 PG (ref 27–31)
MCHC RBC AUTO-ENTMCNC: 31.2 G/DL (ref 32–36)
MCV RBC AUTO: 88 FL (ref 82–98)
MONOCYTES # BLD AUTO: 0.8 K/UL (ref 0.3–1)
MONOCYTES NFR BLD: 8.6 % (ref 4–15)
NEUTROPHILS # BLD AUTO: 5.6 K/UL (ref 1.8–7.7)
NEUTROPHILS NFR BLD: 59 % (ref 38–73)
NRBC BLD-RTO: 0 /100 WBC
PHOSPHATE SERPL-MCNC: 5 MG/DL (ref 2.7–4.5)
PLATELET # BLD AUTO: 348 K/UL (ref 150–350)
PMV BLD AUTO: 9.6 FL (ref 9.2–12.9)
POTASSIUM SERPL-SCNC: 4.1 MMOL/L (ref 3.5–5.1)
PROT SERPL-MCNC: 6.3 G/DL (ref 6–8.4)
RBC # BLD AUTO: 3.69 M/UL (ref 4–5.4)
SODIUM SERPL-SCNC: 139 MMOL/L (ref 136–145)
WBC # BLD AUTO: 9.48 K/UL (ref 3.9–12.7)

## 2019-07-18 PROCEDURE — 85025 COMPLETE CBC W/AUTO DIFF WBC: CPT

## 2019-07-18 PROCEDURE — 80053 COMPREHEN METABOLIC PANEL: CPT

## 2019-07-18 PROCEDURE — 99233 PR SUBSEQUENT HOSPITAL CARE,LEVL III: ICD-10-PCS | Mod: ,,, | Performed by: PSYCHIATRY & NEUROLOGY

## 2019-07-18 PROCEDURE — 25000003 PHARM REV CODE 250: Performed by: PSYCHIATRY & NEUROLOGY

## 2019-07-18 PROCEDURE — 25000003 PHARM REV CODE 250: Performed by: NURSE PRACTITIONER

## 2019-07-18 PROCEDURE — 99233 SBSQ HOSP IP/OBS HIGH 50: CPT | Mod: ,,, | Performed by: PSYCHIATRY & NEUROLOGY

## 2019-07-18 PROCEDURE — 84100 ASSAY OF PHOSPHORUS: CPT

## 2019-07-18 PROCEDURE — 25000003 PHARM REV CODE 250: Performed by: UROLOGY

## 2019-07-18 PROCEDURE — 83735 ASSAY OF MAGNESIUM: CPT

## 2019-07-18 RX ORDER — NIMODIPINE 30 MG/1
60 CAPSULE, LIQUID FILLED ORAL EVERY 4 HOURS
Qty: 72 CAPSULE | Refills: 0 | Status: SHIPPED | OUTPATIENT
Start: 2019-07-18 | End: 2019-08-05

## 2019-07-18 RX ORDER — NIMODIPINE 30 MG/1
60 CAPSULE, LIQUID FILLED ORAL EVERY 4 HOURS
Qty: 72 CAPSULE | Refills: 0 | Status: SHIPPED | OUTPATIENT
Start: 2019-07-18 | End: 2019-07-18

## 2019-07-18 RX ORDER — TIZANIDINE 2 MG/1
2 TABLET ORAL NIGHTLY PRN
Qty: 30 TABLET | Refills: 0 | Status: SHIPPED | OUTPATIENT
Start: 2019-07-18 | End: 2019-08-15

## 2019-07-18 RX ORDER — TIZANIDINE 2 MG/1
2 TABLET ORAL NIGHTLY PRN
Qty: 30 TABLET | Refills: 0 | Status: SHIPPED | OUTPATIENT
Start: 2019-07-18 | End: 2019-07-18

## 2019-07-18 RX ADMIN — MONTELUKAST SODIUM 10 MG: 10 TABLET, COATED ORAL at 08:07

## 2019-07-18 RX ADMIN — SENNOSIDES,DOCUSATE SODIUM 1 TABLET: 8.6; 5 TABLET, FILM COATED ORAL at 08:07

## 2019-07-18 RX ADMIN — ACETAMINOPHEN 650 MG: 325 TABLET ORAL at 11:07

## 2019-07-18 RX ADMIN — NIMODIPINE 60 MG: 30 CAPSULE, LIQUID FILLED ORAL at 02:07

## 2019-07-18 RX ADMIN — NIMODIPINE 60 MG: 30 CAPSULE, LIQUID FILLED ORAL at 09:07

## 2019-07-18 RX ADMIN — NIMODIPINE 60 MG: 30 CAPSULE, LIQUID FILLED ORAL at 06:07

## 2019-07-18 RX ADMIN — GABAPENTIN 100 MG: 100 CAPSULE ORAL at 08:07

## 2019-07-18 RX ADMIN — GABAPENTIN 100 MG: 100 CAPSULE ORAL at 02:07

## 2019-07-18 RX ADMIN — PANTOPRAZOLE SODIUM 40 MG: 40 TABLET, DELAYED RELEASE ORAL at 08:07

## 2019-07-18 NOTE — PROGRESS NOTES
[facility]  Neurocritical Care  Progress Note    Admit Date: 7/4/2019  Service Date: 07/18/2019  Length of Stay: 14    Subjective:     Chief Complaint: Nontraumatic subarachnoid hemorrhage from anterior communicating artery    History of Present Illness: 64 y F with medical h.o breast cancer s/p mastectomy, lumpectomy, who presented to the ER as a transfer from [facility] as SAH detected on CTH.  Patient's symptoms started on Tuesday 7/2 when she was undergoing a CT abdomen for evaluation of indigestion. She started to feel a pounding and congestion of head and ears. She did not seek medical attention till today 7/4 when she went to urgent care. Urgent care doc did not feel comfortable treating it as migraine and sent patient to ER; where CT head detected SAH. She is transferred here to [facility].  Here a CTA detected CAROLINA aneurysm and patient is being sent for angio and further neurosurg intervention,.    Interval history: ruptured ACOM aneurysm now s/p coiling 7/5.  [facility]  On 7/14, stepped back up to Neuro ICU for concern for vasospasm  7/17 No significant events over night. TCD daily no vasospasms noted.    Hospital Course: 07/05/2019 NAEO. For conventional angiogram today ACOM aneurysm on CTA  7/7/19: add lovenox for DVT proph., initiate IVF, PT/OT, liberalize BP <200  07/08/2019 headache. Mild to moderate cerebral VSP.   07/09/2019 NAEON  7/10/2019: increased night time gabapentin, add senna doc, CT head today (ordered per NSGY)-stable, continue TCDs and nimotop, dexamethasone 4 mg once PRN (for headache).   07/12/2019: severe headache overnight, resolved with steroids  7/14: stepped up to neuro ICU, concern for vasospasm. TCDs, bolus of fluids given  07/15/2019: stable exam, headache controlled with steroids  07/16/2019: no acute adverse events overnight  7:18, no acute events overnight. Patient to be discharged home today    No new subjective &  objective note has been filed under this hospital service since the last note was generated.    Assessment/Plan:     No new Assessment & Plan notes have been filed under this hospital service since the last note was generated.  Service: Neuro Critical Care        The patient is being Prophylaxed for:  Venous Thromboembolism with: Mechanical or Chemical  Stress Ulcer with: Not Applicable   Ventilator Pneumonia with: not applicable    Activity Orders          Diet Adult Regular (IDDSI Level 7): Regular starting at 07/14 7851        Full Code    Sravan Gay MD  Neurocritical Care  [facility]

## 2019-07-18 NOTE — PROGRESS NOTES
[facility]  Vascular Neurology  Comprehensive Stroke Center  Progress Note    Assessment/Plan:     * Nontraumatic subarachnoid hemorrhage from anterior communicating artery  65 yo with hx of breast cancer presents to the ED with complaints of intractable headache after a barium CT abdomen on 7/2. CT head on 7/4 with diffuse SAH within basal cisterns and midline- NSY was consulted. CTA on 7/4 revealed small anterior communicating artery aneurysm-. She was taken to IR on 7/5 and is now s/p coiling. Patient is admitted to [facility] for close monitoring.     Mild left CAROLINA territory vasospasm on 7/11 TCD. Worsening vasospasm on 7/15 however neuro exam improved w/o angio intervention. Slightly improving as of TCD on 7/16. Continuing to monitor for exam changes, daily TCDs    Antithrombotics: none (SAH)  Statins: none (SAH)  Aggressive risk factor modification: HLD  Rehab efforts: PT/OT/SLP to eval and treat.   Diagnostics ordered/pending: daily TCDs  VTE prophylaxis: lovenox  BP parameters: secured aneurysm, can liberate blood pressure as needed to prevent vasospasm    Vasogenic cerebral edema  Area of vasogenic cerebral edema identified when reviewing brain imaging in the subarachnoid space. There is no mass effect associated with it. We will continue to monitor the patients clinical exam for any worsening of symptoms which may indicate expansion of the hemorrhage or the area of the edema resulting in the clinical change. The SAH is likely 2/2 aneurysm rupture.      Headache  Improving   Management per primary    Cerebral vasospasm  Evident on CTA and TCD  Continue nimodipine and IVF  Daily TCD  Monitor for exam changes      Aneurysm of anterior communicating artery  S/p coiling- done on 7/4 7/6/19: NAEON. Patient now s/p coiling. Patient reporting improvement in headaches.   7/719: Neuro exam stable. Patient c/o difficulty sleeping and headache overnight. TCDs show increased velocities. Patient to  continue PRN tylenol for HA and start ramelteon for sleep tonight.   07/08/2019 neuro exam states stable, complains of some non-constant headache worsening with moving. Some pain with lateral gaze.   07/11/2019 repeat HCT shows resolved SAH, no vasospasm, no hydrocephalus.  7/12: Neuro exam normal. No headache this AM. Patient states headache was severe yesterday afternoon. TCD from today pending.   7/17: NAEON. Vasospasm watch continued; TCD on 7/16 shows mild - moderate L CAROLINA vasospasm, slightly improved from 7/15. Headaches improving per patient.   7/18: continued vasospasm, last TCD with some improvement in CAROLINA vasospasm, patient currently on IVF, denies new neurologic changes    STROKE DOCUMENTATION   Acute Stroke Times   Last Known Normal Date: 07/03/19  Last Known Normal Time: (unable to determine)  Symptom Onset Date: 07/03/19  Symptom Onset Time: (unclear)  Stroke Team Called Date: 07/05/19  Stroke Team Called Time: 0820  Stroke Team Arrival Date: 07/05/19  Stroke Team Arrival Time: 0825  CT Interpretation Time: 0825  Decision to Treat Time for Alteplase: (No iv alteplase candidate)  Decision to Treat Time for IR: 0825    NIH Scale:  1a. Level of Consciousness: 0-->Alert, keenly responsive  1b. LOC Questions: 0-->Answers both questions correctly  1c. LOC Commands: 0-->Performs both tasks correctly  2. Best Gaze: 0-->Normal  3. Visual: 0-->No visual loss  4. Facial Palsy: 0-->Normal symmetrical movements  5a. Motor Arm, Left: 0-->No drift, limb holds 90 (or 45) degrees for full 10 secs  5b. Motor Arm, Right: 0-->No drift, limb holds 90 (or 45) degrees for full 10 secs  6a. Motor Leg, Left: 0-->No drift, leg holds 30 degree position for full 5 secs  6b. Motor Leg, Right: 0-->No drift, leg holds 30 degree position for full 5 secs  7. Limb Ataxia: 0-->Absent  8. Sensory: 0-->Normal, no sensory loss  9. Best Language: 0-->No aphasia, normal  10. Dysarthria: 0-->Normal  11. Extinction and Inattention (formerly  Neglect): 0-->No abnormality  Total (NIH Stroke Scale): 0       Modified Winchester Score: 0  Dino Coma Scale:    ABCD2 Score:    [id number] Score:   HAS -BLED Score:   ICH Score:0  Hunt & Rucker Classification:Grade I     Hemorrhagic change of an Ischemic Stroke: Does this patient have an ischemic stroke with hemorrhagic changes? No     Neurologic Chief Complaint: Headache, SAH    Subjective:     Interval History: Patient is seen for follow-up neurological assessment and treatment recommendations: NAEON. No new complaints, continued headache and back spasm, denies new neurologic changes    HPI, Past Medical, Family, and Social History remains the same as documented in the initial encounter.     Review of Systems   Constitutional: Negative for fever.   HENT: Negative for trouble swallowing and voice change.    Eyes: Negative for visual disturbance.   Respiratory: Negative for shortness of breath.    Cardiovascular: Negative for palpitations.   Gastrointestinal: Negative for nausea.   Musculoskeletal: Positive for back pain (spastic pain). Negative for neck pain and neck stiffness.   Neurological: Positive for headaches. Negative for dizziness, tremors, seizures, facial asymmetry, speech difficulty, weakness and numbness.   Psychiatric/Behavioral: Negative for confusion.     Scheduled Meds:   enoxparin  40 mg Subcutaneous Q24H    gabapentin  100 mg Oral TID    gabapentin  200 mg Oral QHS    montelukast  10 mg Oral Daily    niMODipine  60 mg Oral Q4H    pantoprazole  40 mg Oral BID    polyethylene glycol  17 g Oral BID    senna-docusate 8.6-50 mg  1 tablet Oral BID     Continuous Infusions:   lactated ringers 125 mL/hr at 07/18/19 0800     PRN Meds:acetaminophen, magnesium oxide, magnesium oxide, ondansetron, potassium chloride 10%, potassium chloride 10%, potassium chloride 10%, potassium, sodium phosphates, potassium, sodium phosphates, potassium, sodium phosphates, simethicone, sodium chloride 0.9%,  tiZANidine    Objective:     Vital Signs (Most Recent):  Temp: 97.9 °F (36.6 °C) (07/18/19 1105)  Pulse: 85 (07/18/19 1105)  Resp: (!) 40 (07/18/19 1105)  BP: (!) 143/58 (07/18/19 1105)  SpO2: 98 % (07/18/19 1105)  BP Location: Right arm    Vital Signs Range (Last 24H):  Temp:  [97.9 °F (36.6 °C)-98.7 °F (37.1 °C)]   Pulse:  [65-87]   Resp:  [11-40]   BP: (112-193)/()   SpO2:  [91 %-100 %]   BP Location: Right arm    Physical Exam   Constitutional: She is oriented to person, place, and time. She appears well-developed and well-nourished. No distress.   HENT:   Head: Normocephalic and atraumatic.   Eyes: Pupils are equal, round, and reactive to light. EOM are normal.   Cardiovascular: Normal rate.   Pulmonary/Chest: Effort normal.   Musculoskeletal: Normal range of motion.   Neurological: She is alert and oriented to person, place, and time.   Skin: Skin is warm and dry.   Nursing note and vitals reviewed.      Neurological Exam:   LOC: alert  Attention Span: Good   Language: No aphasia  Articulation: No dysarthria  Orientation: Person, Place, Time   Visual Fields: Full  EOM (CN III, IV, VI): Full/intact  Pupils (CN II, III): PERRL  Facial Sensation (CN V): Normal  Facial Movement (CN VII): Symmetric facial expression    Motor: Arm left  Normal 5/5  Leg left  Normal 5/5  Arm right  Normal 5/5  Leg right Normal 5/5  Cebellar: No evidence of appendicular or axial ataxia  Sensation: Intact to light touch, temperature and vibration      Diagnostic Results     Brain Imaging   CTA head 7/14/19  In this patient status post A-comm coiling, there is redemonstration of hypoplastic right A1 segment, with subtle narrowing of the proximal left A2 segment which could reflect focal vasospasm.    CT head 7/11/19  Subarachnoid hemorrhage almost completely resolved.  Interval coiling of a A-comm aneurysm.  No vasospasm related complication seen.     CT head w/o contrast 07/04/2019  1. Subarachnoid hemorrhage.  In the setting of  no trauma suspect aneurysm    Vessel Imaging      CTA Head. Date: 07/14/19  In this patient status post A-comm coiling, there is redemonstration of hypoplastic right A1 segment, with subtle narrowing of the proximal left A2 segment which could reflect focal vasospasm.      IR angio 07/05/2019  1. 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    2.  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    3.  Distal embolism likely from the access catheter into the distal branches of the anterior middle cerebral artery on the left side treated successfully with intra-arterial Integrilin     CTA head 07/04/2019  Small anterior communicating artery aneurysm likely the origin of the recent subarachnoid bleed.    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 The estimated ejection fraction is 63%  · Concentric left ventricular remodeling.  · No wall motion abnormalities.  · Normal LV diastolic function.  · Normal right ventricular systolic function.  · Mild tricuspid regurgitation.  · Normal central venous pressure (3 mm Hg).  · The estimated PA systolic pressure is 22 mm Hg               Nel Cobb PA-C  Comprehensive Stroke Center  Department of Vascular Neurology   [facility]

## 2019-07-18 NOTE — DISCHARGE INSTRUCTIONS
Carotid Angiography    Carotid angiography is a type of X-ray test used to view the carotid arteries. These are the large blood vessels that supply your brain with blood. During the test, a thin, flexible tube called a catheter is passed into an artery leading to the carotids. Contrast fluid is then injected through the catheter. The fluid makes it easier to see the carotids on the X-rays.  Talk to your doctor about the risks and complications of angiography.   How do I get ready for a carotid angiography?  · Tell your doctor about any allergies you may have, including any to contrast dye.  · Be sure your doctor knows about all medicines you take. You may be told to stop taking some or all of them before the test. Tell your doctor about:  ¨ All prescription medications  ¨ Over-the-counter medicines that don't need a prescription  ¨ Any street drugs you may use   ¨ Herbs, vitamins, kelp, seaweed, cough syrups, and supplements  · Tell your doctor if you are pregnant, think you could be pregnant, or are breastfeeding.   · Dont eat or drink after midnight the night before the procedure. If your doctor says to take your normal medicines, swallow them with only small sips of water.  · Arrange for an adult family member or friend to drive you home.  What happens during carotid angiography?  · An IV (intravenous) line is started in your arm. You may also be given a medicine that helps you relax (sedative).  · Youre given an injection to numb the site where the catheter will be inserted. This is usually the groin area.  · A small puncture is made into the artery, and the catheter is inserted. Using X-rays, the catheter is then carefully guided through the artery.  · Contrast fluid is injected through the catheter into the artery. You may feel warmth or pressure in your legs, back, neck, or head. You will need to lie still as X-rays are taken of the carotids. You may be asked to hold your breath during injections. When  the procedure is complete, the catheter is removed.  What happens after carotid angiography?  · Youll be taken to a recovery area.  · A nurse will apply pressure to the insertion site for about 10 minutes.  · Youll then need to lie flat for a few hours.  · Your doctor will discuss the results with you soon after the procedure.  Once you are home:  · Dont drive for 24 hours.  · Avoid walking, bending, lifting, and taking stairs for 24 hours.  · Avoid lifting anything over 5 pounds for 7 days.  Be sure to follow any other instructions from your doctor.  When should I call my health care provider?  Call your doctor if you have any of the following:  · Fever of 100.4°F (38°C) or higher lasting for 24 to 48 hours  · Bleeding, swelling, or a lump at the insertion site  · Sharp or increasing pain at the insertion site  · Dizziness or lightheadedness  · Leg pain, numbness, or a cold leg or foot  · Severe headache, visual problems, or trouble speaking  · Any other symptoms your provider instructed you to report based on your medical condition  Date Last Reviewed: 6/19/2015  © 4109-2780 [employer]. [address] [place] [id number]. All rights reserved. This information is not intended as a substitute for professional medical care. Always follow your healthcare professional's instructions.

## 2019-07-18 NOTE — PROGRESS NOTES
[facility]  Neurosurgery  Progress Note    Subjective:     History of Present Illness: 63 yo with hx of breast cancer presents to the ED with complaints of headache after a barium CT abdomen on Tuesday. Reports intractable headache which began 2 days ago. Associated n/v, neck pain. Denies visual changes, weakness, paresthesias, b/b dysfunction. No trauma, falls, or LOC. She does not take antiplatelet therapy or anticoagulants. Currently on a cardene drip started at the outside hospital. She states she is tired from the morphine given prior to transport. Family at bedside.       Post-Op Info:  Procedure(s) (LRB):  ANGIOGRAM-CEREBRAL, acomm aneurysm (N/A)   13 Days Post-Op     Interval History: NAEON. PBD 15. PT/OT discharge to home. Headaches improved.     Medications:  Continuous Infusions:   lactated ringers 125 mL/hr at 07/18/19 0800     Scheduled Meds:   enoxparin  40 mg Subcutaneous Q24H    gabapentin  100 mg Oral TID    gabapentin  200 mg Oral QHS    montelukast  10 mg Oral Daily    niMODipine  60 mg Oral Q4H    pantoprazole  40 mg Oral BID    polyethylene glycol  17 g Oral BID    senna-docusate 8.6-50 mg  1 tablet Oral BID     PRN Meds:acetaminophen, magnesium oxide, magnesium oxide, ondansetron, potassium chloride 10%, potassium chloride 10%, potassium chloride 10%, potassium, sodium phosphates, potassium, sodium phosphates, potassium, sodium phosphates, simethicone, sodium chloride 0.9%, tiZANidine     Review of Systems  Objective:     Weight: 69.9 kg (154 lb)  Body mass index is 25.63 kg/m².  Vital Signs (Most Recent):  Temp: 98.6 °F (37 °C) (07/18/19 0705)  Pulse: 78 (07/18/19 1005)  Resp: 19 (07/18/19 1005)  BP: (!) 193/92 (07/18/19 1005)  SpO2: 100 % (07/18/19 1005) Vital Signs (24h Range):  Temp:  [98.3 °F (36.8 °C)-98.7 °F (37.1 °C)] 98.6 °F (37 °C)  Pulse:  [65-87] 78  Resp:  [11-31] 19  SpO2:  [91 %-100 %] 100 %  BP: (112-193)/() 193/92     Date 07/18/19 0700 -  07/19/19 0659   Shift 7625-6156 9308-0347 6598-3385 24 Hour Total   INTAKE   I.V.(mL/kg) 250(3.6)   250(3.6)   Shift Total(mL/kg) 250(3.6)   250(3.6)   OUTPUT   Urine(mL/kg/hr) 950   950   Shift Total(mL/kg) 950(13.6)   950(13.6)   Weight (kg) 69.9 69.9 69.9 69.9                   Female External Urinary Catheter 07/14/19 1820 (Active)   Skin no redness;no breakdown 7/18/2019  7:05 AM   Tolerance no signs/symptoms of discomfort 7/18/2019  7:05 AM   Suction Continuous suction at 40 mmHg 7/18/2019  7:05 AM   Date of last wick change 07/18/19 7/18/2019  7:05 AM   Time of last wick change 0200 7/18/2019  7:05 AM   Output (mL) 350 mL 7/18/2019 10:00 AM       Neurosurgery Physical Exam   AOX3, speech fluent  PERRL  FCX4  SILT  No drift      Significant Labs:  Recent Labs   Lab 07/17/19  0211 07/18/19  0307   GLU 98 115*    139   K 3.7 4.1    105   CO2 25 24   BUN 12 11   CREATININE 0.7 0.7   CALCIUM 9.3 9.5   MG 2.1 2.2     Recent Labs   Lab 07/17/19  0211 07/18/19  0307   WBC 10.80 9.48   HGB 10.1* 10.1*   HCT 33.0* 32.4*    348       Significant Diagnostics:  No new imaging    Assessment/Plan:     Aneurysm of anterior communicating artery  64 F who presented with severe HA, neck pain, vomiting and found to have HH2 F2 SAH secondary to ruptured ACOM aneurysm now s/p coiling 7/5, stepped up to ICU 7/14 secondary to vasospasm.    PBD 15 PCD13    Neuro:  --Admit floor      -q4h neurochecks  --HOB@30  --Keppra 500 BID x7d (complete)  --TCDs daily x14d (complete)  --Nimotop 60q4 x 21d  --Pain control - HA relieved by steroid IV in past; will trial Fioricet  --Continue to follow clinically and notify NSGY immediately if any neurostatus change  Will require angiogram if returns into vasospasm    CVS:  ---220 mmHg (cardene ggt; hydralazine & labetalol PRN)  --Echo normal    Pulm:  --on RA    GIT/Electrolytes:  --CMP daily      -Replete electrolytes PRN  --Na goal >135    Renal:  --Strict I/Os  --Goal of  euvolemia or net +1L  - MIVF    Heme/ID:  --Daily CBC      -Transfuse PRN  --Trend WBC and T    PPx:  --TEDs/SCDs  --Lovenox  --PPI    --PT/OT rec for home    Dispo: DC home today        Lizbeth Packer MD  Neurosurgery  [facility]

## 2019-07-18 NOTE — ASSESSMENT & PLAN NOTE
-nontraumatic SAH  -HH2 F2 SAH secondary to ruptured ACOM aneurysm now s/p coiling 7/5  -NSGY following  -daily TCDs, shows no vasospasm  -Nimotop 60mg q4h  -SBP<200  -continue IVF LR 125cc/h  -PT/OT  -neuro checks q 1 hr  -vasospasm watch  -continue ICU monitoring for 48 more hours minimum  -angio should symptoms develop

## 2019-07-18 NOTE — PROGRESS NOTES
[facility]  Neurocritical Care  Progress Note    Admit Date: 7/4/2019  Service Date: 07/18/2019  Length of Stay: 14    Subjective:     Chief Complaint: Nontraumatic subarachnoid hemorrhage from anterior communicating artery    History of Present Illness: 64 y F with medical h.o breast cancer s/p mastectomy, lumpectomy, who presented to the ER as a transfer from [facility] as SAH detected on CTH.  Patient's symptoms started on Tuesday 7/2 when she was undergoing a CT abdomen for evaluation of indigestion. She started to feel a pounding and congestion of head and ears. She did not seek medical attention till today 7/4 when she went to urgent care. Urgent care doc did not feel comfortable treating it as migraine and sent patient to ER; where CT head detected SAH. She is transferred here to [facility].  Here a CTA detected CAROLINA aneurysm and patient is being sent for angio and further neurosurg intervention,.    Interval history: ruptured ACOM aneurysm now s/p coiling 7/5.  [facility]  On 7/14, stepped back up to Neuro ICU for concern for vasospasm  7/17 No significant events over night. TCD daily no vasospasms noted.    Hospital Course: 07/05/2019 NAEO. For conventional angiogram today ACOM aneurysm on CTA  7/7/19: add lovenox for DVT proph., initiate IVF, PT/OT, liberalize BP <200  07/08/2019 headache. Mild to moderate cerebral VSP.   07/09/2019 NAEON  7/10/2019: increased night time gabapentin, add senna doc, CT head today (ordered per NSGY)-stable, continue TCDs and nimotop, dexamethasone 4 mg once PRN (for headache).   07/12/2019: severe headache overnight, resolved with steroids  7/14: stepped up to neuro ICU, concern for vasospasm. TCDs, bolus of fluids given  07/15/2019: stable exam, headache controlled with steroids  07/16/2019: no acute adverse events overnight  7:18, no acute events overnight. Patient to be discharged home today    Interval History:     CAROLINABLANCAON,  Patient seen and examined at bedside, no fresh complaints  S/P coiling  Plan for discharge home today    Review of Systems   Constitutional: Negative for chills, fever and unexpected weight change.   HENT: Negative for ear pain, hearing loss, sneezing and sore throat.    Eyes: Negative for discharge.   Respiratory: Negative for cough, chest tightness and wheezing.    Cardiovascular: Negative for chest pain, palpitations and leg swelling.   Gastrointestinal: Negative for abdominal distention, anal bleeding, blood in stool, constipation, nausea, rectal pain and vomiting.   Endocrine: Negative for cold intolerance and heat intolerance.   Genitourinary: Negative for difficulty urinating, flank pain, frequency, pelvic pain, urgency, vaginal bleeding, vaginal discharge and vaginal pain.   Musculoskeletal: Negative for back pain, myalgias and neck pain.   Neurological: Negative for dizziness, tremors, seizures, syncope, speech difficulty, weakness, numbness and headaches.   Hematological: Negative for adenopathy. Does not bruise/bleed easily.   Psychiatric/Behavioral: Negative for behavioral problems, confusion, hallucinations, sleep disturbance and suicidal ideas. The patient is not nervous/anxious.        Objective:     Vitals:  Temp: 97.9 °F (36.6 °C)  Pulse: 80  Rhythm: normal sinus rhythm  BP: (!) 171/74  MAP (mmHg): 107  Resp: 18  SpO2: 97 %  O2 Device (Oxygen Therapy): room air    Temp  Min: 97.9 °F (36.6 °C)  Max: 98.7 °F (37.1 °C)  Pulse  Min: 65  Max: 87  BP  Min: 112/57  Max: 193/92  MAP (mmHg)  Min: 77  Max: 140  Resp  Min: 11  Max: 40  SpO2  Min: 91 %  Max: 100 %    07/17 0701 - 07/18 0700  In: 3489.6 [P.O.:500; I.V.:2989.6]  Out: 3450 [Urine:3450]   Unmeasured Output  Urine Occurrence: 1  Stool Occurrence: 0  Emesis Occurrence: 0  Pad Count: 1       Physical Exam   Constitutional: She is oriented to person, place, and time. She appears well-developed and well-nourished.   HENT:   Head: Normocephalic  and atraumatic.   Eyes: Pupils are equal, round, and reactive to light. Conjunctivae and EOM are normal.   Neck: Normal range of motion. Neck supple.   Cardiovascular: Normal rate, regular rhythm, normal heart sounds and intact distal pulses. Exam reveals no gallop and no friction rub.   No murmur heard.  Pulmonary/Chest: Effort normal and breath sounds normal. No stridor. No respiratory distress. She has no wheezes. She has no rales. She exhibits no tenderness.   Abdominal: Soft. Bowel sounds are normal. She exhibits no distension and no mass. There is no tenderness. There is no rebound and no guarding. No hernia.   Musculoskeletal: Normal range of motion. She exhibits no edema, tenderness or deformity.   Neurological: She is alert and oriented to person, place, and time. She has normal strength. She displays normal reflexes. No cranial nerve deficit or sensory deficit. She exhibits normal muscle tone. She displays a negative Romberg sign. She displays no seizure activity. Coordination and gait normal. GCS eye subscore is 4. GCS verbal subscore is 5. GCS motor subscore is 6. She displays no Babinski's sign on the right side. She displays no Babinski's sign on the left side.   Reflex Scores:       Tricep reflexes are 2+ on the right side and 2+ on the left side.       Bicep reflexes are 2+ on the right side and 2+ on the left side.       Brachioradialis reflexes are 2+ on the right side and 2+ on the left side.       Patellar reflexes are 2+ on the right side and 2+ on the left side.       Achilles reflexes are 2+ on the right side and 2+ on the left side.  Skin: Skin is warm and dry. Capillary refill takes less than 2 seconds.   Psychiatric: She has a normal mood and affect. Her behavior is normal. Thought content normal.   Nursing note and vitals reviewed.    Medications:  Continuous Scheduled  enoxparin 40 mg Q24H   gabapentin 100 mg TID   gabapentin 200 mg QHS   montelukast 10 mg Daily   niMODipine 60 mg Q4H    pantoprazole 40 mg BID   polyethylene glycol 17 g BID   senna-docusate 8.6-50 mg 1 tablet BID   PRN  acetaminophen 650 mg Q6H PRN   magnesium oxide 800 mg PRN   magnesium oxide 800 mg PRN   ondansetron 4 mg Q6H PRN   potassium chloride 10% 40 mEq PRN   potassium chloride 10% 40 mEq PRN   potassium chloride 10% 60 mEq PRN   potassium, sodium phosphates 2 packet PRN   potassium, sodium phosphates 2 packet PRN   potassium, sodium phosphates 2 packet PRN   simethicone 1 tablet TID PRN   sodium chloride 0.9% 10 mL PRN   tiZANidine 2 mg Nightly PRN     Today I personally reviewed pertinent medications, lines/drains/airways, imaging, cardiology results, laboratory results, microbiology results,    Diet  Diet Adult Regular (IDDSI Level 7)  Diet Adult Regular  Diet Adult Regular (IDDSI Level 7)  Diet Adult Regular        Assessment/Plan:     Neuro  * Nontraumatic subarachnoid hemorrhage from anterior communicating artery  -nontraumatic SAH  -HH2 F2 SAH secondary to ruptured ACOM aneurysm now s/p coiling 7/5  -NSGY following, plan to discharge home today  -daily TCDs, shows no vasospasm  -Nimotop 60mg q4h  -SBP<200  -continue IVF LR 125cc/h  -PT/OT  -neuro checks q 1 hr  -vasospasm watch  -continue ICU monitoring for 48 more hours minimum  -angio should symptoms develop    Aneurysm of anterior communicating artery  S/p coiling            The patient is being Prophylaxed for:  Venous Thromboembolism with: Mechanical or Chemical  Stress Ulcer with: Not Applicable   Ventilator Pneumonia with: not applicable    Activity Orders          Diet Adult Regular (IDDSI Level 7): Regular starting at 07/14 0336        Full Code    Sravan Gay MD  Neurocritical Care  [facility]

## 2019-07-18 NOTE — DISCHARGE SUMMARY
[facility]  Neurosurgery  Discharge Summary      Patient Name: Gissel Jamison  MRN: [id number]  Admission Date: 7/4/2019  Hospital Length of Stay: 14 days  Discharge Date and Time:  07/18/2019 11:33 AM  Attending Physician: Frederic Greenberg MD   Discharging Provider: Lizbeth Packer MD  Primary Care Provider: Magen Huerta Iii, MD    HPI:   63 yo with hx of breast cancer presents to the ED with complaints of headache after a barium CT abdomen on Tuesday. Reports intractable headache which began 2 days ago. Associated n/v, neck pain. Denies visual changes, weakness, paresthesias, b/b dysfunction. No trauma, falls, or LOC. She does not take antiplatelet therapy or anticoagulants. Currently on a cardene drip started at the outside hospital. She states she is tired from the morphine given prior to transport. Family at bedside.       Procedure(s) (LRB):  ANGIOGRAM-CEREBRAL, acomm aneurysm (N/A)     Hospital Course: Patient admitted on 7/4 for likely aneurysmal SAH. EVD watch overnight, plan for coiling in AM. Coiled on 7/5,  TCDs w/o evidence of vasospasm, exam improved from pre-op. Patient was observed in the ICU and transferred to the floor when appropriate. Stepped back up to the unit on PBD 12 after CTA with early vasospasm, improved w/fluids. Cleared for home by PT/OT. Will be discharged w/close follow up.     Consults:   Consults (From admission, onward)        Status Ordering Provider     Inpatient consult to Midline team  Once     Provider:  (Not yet assigned)    Completed TU ALFARO     Inpatient consult to Neurosurgery  Once     Provider:  (Not yet assigned)    Completed SARAH FAROOQ          Significant Diagnostic Studies: Labs:   BMP:   Recent Labs   Lab 07/17/19 0211 07/18/19  0307   GLU 98 115*    139   K 3.7 4.1    105   CO2 25 24   BUN 12 11   CREATININE 0.7 0.7   CALCIUM 9.3 9.5   MG 2.1 2.2    and CBC   Recent Labs   Lab 07/17/19 0211 07/18/19  0307    WBC 10.80 9.48   HGB 10.1* 10.1*   HCT 33.0* 32.4*    348       Pending Diagnostic Studies:     Procedure Component Value Units Date/Time    IR Angiogram Each Additional Vessel [[id number]]     Order Status:  Sent Lab Status:  No result     US Transcran Doppler Intracran Artr Comp [[id number]]     Order Status:  Sent Lab Status:  No result     US Transcran Doppler Intracran Artr Comp [[id number]]     Order Status:  Sent Lab Status:  No result         Final Active Diagnoses:    Diagnosis Date Noted POA    PRINCIPAL PROBLEM:  Nontraumatic subarachnoid hemorrhage from anterior communicating artery [I60.2] 07/04/2019 Yes    Fatty liver disease, nonalcoholic [K76.0] 07/15/2019 Yes    Anemia [D64.9] 07/12/2019 No    Headache [R51] 07/11/2019 Yes    Transaminitis [R74.0] 07/11/2019 Yes    Other headache syndrome [G44.89] 07/10/2019 Yes    Cerebral vasospasm [I67.848] 07/08/2019 No    Aneurysm of anterior communicating artery [I67.1]  Yes    Mild intermittent asthma with status asthmaticus [J45.22]  Yes      Problems Resolved During this Admission:      Discharged Condition: stable    Disposition: Home or Self Care    Follow Up:  Follow-up Information     Esau Heath MD In 4 weeks.    Specialty:  Neurosurgery  Why:  follow up w/CT  Contact information:  [address] [facility] [address]  [phone]                 Patient Instructions:     Follow up with Dr. Heath in 4 weeks with [facility]  Will need repeat angiogram in 6 months, will arrange at clinic visit.      CT Head Without Contrast   Standing Status: Future Standing Exp. Date: 07/18/20     Order Specific Question Answer Comments   May the Radiologist modify the order per protocol to meet the clinical needs of the patient? Yes      Diet Adult Regular     Notify your health care provider if you experience any of the following:  temperature >100.4     Notify your health care provider if you experience any of the following:  persistent nausea and  vomiting or diarrhea     Notify your health care provider if you experience any of the following:  severe uncontrolled pain     Notify your health care provider if you experience any of the following:  severe persistent headache     Notify your health care provider if you experience any of the following:  persistent dizziness, light-headedness, or visual disturbances     Notify your health care provider if you experience any of the following:  increased confusion or weakness     No dressing needed     Activity as tolerated     Medications:  Reconciled Home Medications:      Medication List      START taking these medications    niMODipine 30 MG Cap  Commonly known as:  NIMOTOP  Take 2 capsules (60 mg total) by mouth every 4 (four) hours. for 6 days     tiZANidine 2 MG tablet  Commonly known as:  ZANAFLEX  Take 1 tablet (2 mg total) by mouth nightly as needed (back spasm).        CONTINUE taking these medications    eszopiclone 3 mg Tab  Commonly known as:  LUNESTA  Take 3 mg by mouth nightly.     levocetirizine 5 MG tablet  Commonly known as:  XYZAL  Take 5 mg by mouth once daily.     montelukast 10 mg tablet  Commonly known as:  SINGULAIR     pantoprazole 40 MG tablet  Commonly known as:  PROTONIX  Take 40 mg by mouth 2 (two) times daily.            Lizbeth Packer MD  Neurosurgery  [facility]

## 2019-07-18 NOTE — ASSESSMENT & PLAN NOTE
-nontraumatic SAH  -HH2 F2 SAH secondary to ruptured ACOM aneurysm now s/p coiling 7/5  -NSGY following, plan to discharge home today  -daily TCDs, shows no vasospasm  -Nimotop 60mg q4h  -SBP<200  -continue IVF LR 125cc/h  -PT/OT  -neuro checks q 1 hr  -vasospasm watch  -continue ICU monitoring for 48 more hours minimum  -angio should symptoms develop

## 2019-07-18 NOTE — PLAN OF CARE
07/18/19 1022   Discharge Reassessment   Assessment Type Discharge Planning Reassessment   Provided patient/caregiver education on the expected discharge date and the discharge plan Yes   Do you have any problems affording any of your prescribed medications? TBD   Discharge Plan A Home with family   Discharge Plan B Home with family   DME Needed Upon Discharge  none   Patient choice form signed by patient/caregiver N/A   Anticipated Discharge Disposition Home   Can the patient answer the patient profile reliably? Yes, cognitively intact   How does the patient rate their overall health at the present time? Good   Describe the patient's ability to walk at the present time. No restrictions   How often would a person be available to care for the patient? Often   Number of comorbid conditions (as recorded on the chart) None   During the past month, has the patient often been bothered by feeling down, depressed or hopeless? No   During the past month, has the patient often been bothered by little interest or pleasure in doing things? No   Post-Acute Status   Post-Acute Authorization Other   Post-Acute Placement Status Awaiting Internal Medical Clearance  (vasospasm watch)   Other Status No Post-Acute Service Needs   Discharge Delays None known at this time       Katie Munroe RN, CCRN-K, [facility]  Neuro-Critical Care   [phone]

## 2019-07-18 NOTE — ASSESSMENT & PLAN NOTE
64 F who presented with severe HA, neck pain, vomiting and found to have HH2 F2 SAH secondary to ruptured ACOM aneurysm now s/p coiling 7/5, stepped up to ICU 7/14 secondary to vasospasm.    PBD 15 PCD13    Neuro:  --Admit floor      -q4h neurochecks  --HOB@30  --Keppra 500 BID x7d (complete)  --TCDs daily x14d (complete)  --Nimotop 60q4 x 21d  --Pain control - HA relieved by steroid IV in past; will trial Fioricet  --Continue to follow clinically and notify NSGY immediately if any neurostatus change  Will require angiogram if returns into vasospasm    CVS:  ---220 mmHg (cardene ggt; hydralazine & labetalol PRN)  --Echo normal    Pulm:  --on RA    GIT/Electrolytes:  --CMP daily      -Replete electrolytes PRN  --Na goal >135    Renal:  --Strict I/Os  --Goal of euvolemia or net +1L  - MIVF    Heme/ID:  --Daily CBC      -Transfuse PRN  --Trend WBC and T    PPx:  --TEDs/SCDs  --Lovenox  --PPI    --PT/OT rec for home    Dispo: DC home today

## 2019-07-18 NOTE — SUBJECTIVE & OBJECTIVE
Interval History: NAEON. PBD 15. PT/OT discharge to home. Headaches improved.     Medications:  Continuous Infusions:   lactated ringers 125 mL/hr at 07/18/19 0800     Scheduled Meds:   enoxparin  40 mg Subcutaneous Q24H    gabapentin  100 mg Oral TID    gabapentin  200 mg Oral QHS    montelukast  10 mg Oral Daily    niMODipine  60 mg Oral Q4H    pantoprazole  40 mg Oral BID    polyethylene glycol  17 g Oral BID    senna-docusate 8.6-50 mg  1 tablet Oral BID     PRN Meds:acetaminophen, magnesium oxide, magnesium oxide, ondansetron, potassium chloride 10%, potassium chloride 10%, potassium chloride 10%, potassium, sodium phosphates, potassium, sodium phosphates, potassium, sodium phosphates, simethicone, sodium chloride 0.9%, tiZANidine     Review of Systems  Objective:     Weight: 69.9 kg (154 lb)  Body mass index is 25.63 kg/m².  Vital Signs (Most Recent):  Temp: 98.6 °F (37 °C) (07/18/19 0705)  Pulse: 78 (07/18/19 1005)  Resp: 19 (07/18/19 1005)  BP: (!) 193/92 (07/18/19 1005)  SpO2: 100 % (07/18/19 1005) Vital Signs (24h Range):  Temp:  [98.3 °F (36.8 °C)-98.7 °F (37.1 °C)] 98.6 °F (37 °C)  Pulse:  [65-87] 78  Resp:  [11-31] 19  SpO2:  [91 %-100 %] 100 %  BP: (112-193)/() 193/92     Date 07/18/19 0700 - 07/19/19 0659   Shift 6097-3927 4361-0827 7669-1086 24 Hour Total   INTAKE   I.V.(mL/kg) 250(3.6)   250(3.6)   Shift Total(mL/kg) 250(3.6)   250(3.6)   OUTPUT   Urine(mL/kg/hr) 950   950   Shift Total(mL/kg) 950(13.6)   950(13.6)   Weight (kg) 69.9 69.9 69.9 69.9                   Female External Urinary Catheter 07/14/19 1820 (Active)   Skin no redness;no breakdown 7/18/2019  7:05 AM   Tolerance no signs/symptoms of discomfort 7/18/2019  7:05 AM   Suction Continuous suction at 40 mmHg 7/18/2019  7:05 AM   Date of last wick change 07/18/19 7/18/2019  7:05 AM   Time of last wick change 0200 7/18/2019  7:05 AM   Output (mL) 350 mL 7/18/2019 10:00 AM       Neurosurgery Physical Exam   AOX3, speech  fluent  PERRL  FCX4  SILT  No drift      Significant Labs:  Recent Labs   Lab 07/17/19  0211 07/18/19  0307   GLU 98 115*    139   K 3.7 4.1    105   CO2 25 24   BUN 12 11   CREATININE 0.7 0.7   CALCIUM 9.3 9.5   MG 2.1 2.2     Recent Labs   Lab 07/17/19  0211 07/18/19  0307   WBC 10.80 9.48   HGB 10.1* 10.1*   HCT 33.0* 32.4*    348       Significant Diagnostics:  No new imaging

## 2019-07-18 NOTE — ASSESSMENT & PLAN NOTE
Continue nimotop   7/14: gave bolus LR and continue @ 125cc/h  , TCD daily   7/16 TCD mild to moderate left CAROLINA vasospasm which is slightly improved from yesterday

## 2019-07-18 NOTE — SUBJECTIVE & OBJECTIVE
Interval History:    NAEON,  Patient seen and examined at bedside, no fresh complaints  S/P coiling  Plan for discharge home today    Review of Systems   Constitutional: Negative for chills, fever and unexpected weight change.   HENT: Negative for ear pain, hearing loss, sneezing and sore throat.    Eyes: Negative for discharge.   Respiratory: Negative for cough, chest tightness and wheezing.    Cardiovascular: Negative for chest pain, palpitations and leg swelling.   Gastrointestinal: Negative for abdominal distention, anal bleeding, blood in stool, constipation, nausea, rectal pain and vomiting.   Endocrine: Negative for cold intolerance and heat intolerance.   Genitourinary: Negative for difficulty urinating, flank pain, frequency, pelvic pain, urgency, vaginal bleeding, vaginal discharge and vaginal pain.   Musculoskeletal: Negative for back pain, myalgias and neck pain.   Neurological: Negative for dizziness, tremors, seizures, syncope, speech difficulty, weakness, numbness and headaches.   Hematological: Negative for adenopathy. Does not bruise/bleed easily.   Psychiatric/Behavioral: Negative for behavioral problems, confusion, hallucinations, sleep disturbance and suicidal ideas. The patient is not nervous/anxious.        Objective:     Vitals:  Temp: 97.9 °F (36.6 °C)  Pulse: 80  Rhythm: normal sinus rhythm  BP: (!) 171/74  MAP (mmHg): 107  Resp: 18  SpO2: 97 %  O2 Device (Oxygen Therapy): room air    Temp  Min: 97.9 °F (36.6 °C)  Max: 98.7 °F (37.1 °C)  Pulse  Min: 65  Max: 87  BP  Min: 112/57  Max: 193/92  MAP (mmHg)  Min: 77  Max: 140  Resp  Min: 11  Max: 40  SpO2  Min: 91 %  Max: 100 %    07/17 0701 - 07/18 0700  In: 3489.6 [P.O.:500; I.V.:2989.6]  Out: 3450 [Urine:3450]   Unmeasured Output  Urine Occurrence: 1  Stool Occurrence: 0  Emesis Occurrence: 0  Pad Count: 1       Physical Exam   Constitutional: She is oriented to person, place, and time. She appears well-developed and well-nourished.   HENT:    Head: Normocephalic and atraumatic.   Eyes: Pupils are equal, round, and reactive to light. Conjunctivae and EOM are normal.   Neck: Normal range of motion. Neck supple.   Cardiovascular: Normal rate, regular rhythm, normal heart sounds and intact distal pulses. Exam reveals no gallop and no friction rub.   No murmur heard.  Pulmonary/Chest: Effort normal and breath sounds normal. No stridor. No respiratory distress. She has no wheezes. She has no rales. She exhibits no tenderness.   Abdominal: Soft. Bowel sounds are normal. She exhibits no distension and no mass. There is no tenderness. There is no rebound and no guarding. No hernia.   Musculoskeletal: Normal range of motion. She exhibits no edema, tenderness or deformity.   Neurological: She is alert and oriented to person, place, and time. She has normal strength. She displays normal reflexes. No cranial nerve deficit or sensory deficit. She exhibits normal muscle tone. She displays a negative Romberg sign. She displays no seizure activity. Coordination and gait normal. GCS eye subscore is 4. GCS verbal subscore is 5. GCS motor subscore is 6. She displays no Babinski's sign on the right side. She displays no Babinski's sign on the left side.   Reflex Scores:       Tricep reflexes are 2+ on the right side and 2+ on the left side.       Bicep reflexes are 2+ on the right side and 2+ on the left side.       Brachioradialis reflexes are 2+ on the right side and 2+ on the left side.       Patellar reflexes are 2+ on the right side and 2+ on the left side.       Achilles reflexes are 2+ on the right side and 2+ on the left side.  Skin: Skin is warm and dry. Capillary refill takes less than 2 seconds.   Psychiatric: She has a normal mood and affect. Her behavior is normal. Thought content normal.   Nursing note and vitals reviewed.    Medications:  Continuous Scheduled  enoxparin 40 mg Q24H   gabapentin 100 mg TID   gabapentin 200 mg QHS   montelukast 10 mg Daily    niMODipine 60 mg Q4H   pantoprazole 40 mg BID   polyethylene glycol 17 g BID   senna-docusate 8.6-50 mg 1 tablet BID   PRN  acetaminophen 650 mg Q6H PRN   magnesium oxide 800 mg PRN   magnesium oxide 800 mg PRN   ondansetron 4 mg Q6H PRN   potassium chloride 10% 40 mEq PRN   potassium chloride 10% 40 mEq PRN   potassium chloride 10% 60 mEq PRN   potassium, sodium phosphates 2 packet PRN   potassium, sodium phosphates 2 packet PRN   potassium, sodium phosphates 2 packet PRN   simethicone 1 tablet TID PRN   sodium chloride 0.9% 10 mL PRN   tiZANidine 2 mg Nightly PRN     Today I personally reviewed pertinent medications, lines/drains/airways, imaging, cardiology results, laboratory results, microbiology results,    Diet  Diet Adult Regular (IDDSI Level 7)  Diet Adult Regular  Diet Adult Regular (IDDSI Level 7)  Diet Adult Regular

## 2019-07-18 NOTE — PLAN OF CARE
07/18/19 1243   Final Note   Assessment Type Final Discharge Note   Anticipated Discharge Disposition Home   Hospital Follow Up  Appt(s) scheduled? Yes   Right Care Referral Info   Post Acute Recommendation No Care       Follow-up Information     Esau Heath MD. Schedule an appointment as soon as possible for a visit in 4 weeks.    Specialty:  Neurosurgery  Why:  follow up w/CT.  A nurse will call you with an appointment.  If you do not receive a call within one week, please call to schedule   Contact information:  [address]  [facility] [address]  [phone]             Magen Huerta Iii, MD On 8/7/2019.    Specialty:  Internal Medicine  Why:  Hospital follow up at 9:20 am   Contact information:  [address]  [address]  [facility] [id number] [phone]                   Katie Munroe RN, CCRN-K, [facility]  Neuro-Critical Care   [phone]

## 2019-07-18 NOTE — SUBJECTIVE & OBJECTIVE
Neurologic Chief Complaint: Headache, SAH    Subjective:     Interval History: Patient is seen for follow-up neurological assessment and treatment recommendations: CAROLINAEON. No new complaints, continued headache and back spasm, denies new neurologic changes    HPI, Past Medical, Family, and Social History remains the same as documented in the initial encounter.     Review of Systems   Constitutional: Negative for fever.   HENT: Negative for trouble swallowing and voice change.    Eyes: Negative for visual disturbance.   Respiratory: Negative for shortness of breath.    Cardiovascular: Negative for palpitations.   Gastrointestinal: Negative for nausea.   Musculoskeletal: Positive for back pain (spastic pain). Negative for neck pain and neck stiffness.   Neurological: Positive for headaches. Negative for dizziness, tremors, seizures, facial asymmetry, speech difficulty, weakness and numbness.   Psychiatric/Behavioral: Negative for confusion.     Scheduled Meds:   enoxparin  40 mg Subcutaneous Q24H    gabapentin  100 mg Oral TID    gabapentin  200 mg Oral QHS    montelukast  10 mg Oral Daily    niMODipine  60 mg Oral Q4H    pantoprazole  40 mg Oral BID    polyethylene glycol  17 g Oral BID    senna-docusate 8.6-50 mg  1 tablet Oral BID     Continuous Infusions:   lactated ringers 125 mL/hr at 07/18/19 0800     PRN Meds:acetaminophen, magnesium oxide, magnesium oxide, ondansetron, potassium chloride 10%, potassium chloride 10%, potassium chloride 10%, potassium, sodium phosphates, potassium, sodium phosphates, potassium, sodium phosphates, simethicone, sodium chloride 0.9%, tiZANidine    Objective:     Vital Signs (Most Recent):  Temp: 97.9 °F (36.6 °C) (07/18/19 1105)  Pulse: 85 (07/18/19 1105)  Resp: (!) 40 (07/18/19 1105)  BP: (!) 143/58 (07/18/19 1105)  SpO2: 98 % (07/18/19 1105)  BP Location: Right arm    Vital Signs Range (Last 24H):  Temp:  [97.9 °F (36.6 °C)-98.7 °F (37.1 °C)]   Pulse:  [65-87]   Resp:   [11-40]   BP: (112-193)/()   SpO2:  [91 %-100 %]   BP Location: Right arm    Physical Exam   Constitutional: She is oriented to person, place, and time. She appears well-developed and well-nourished. No distress.   HENT:   Head: Normocephalic and atraumatic.   Eyes: Pupils are equal, round, and reactive to light. EOM are normal.   Cardiovascular: Normal rate.   Pulmonary/Chest: Effort normal.   Musculoskeletal: Normal range of motion.   Neurological: She is alert and oriented to person, place, and time.   Skin: Skin is warm and dry.   Nursing note and vitals reviewed.      Neurological Exam:   LOC: alert  Attention Span: Good   Language: No aphasia  Articulation: No dysarthria  Orientation: Person, Place, Time   Visual Fields: Full  EOM (CN III, IV, VI): Full/intact  Pupils (CN II, III): PERRL  Facial Sensation (CN V): Normal  Facial Movement (CN VII): Symmetric facial expression    Motor: Arm left  Normal 5/5  Leg left  Normal 5/5  Arm right  Normal 5/5  Leg right Normal 5/5  Cebellar: No evidence of appendicular or axial ataxia  Sensation: Intact to light touch, temperature and vibration      Diagnostic Results     Brain Imaging   CTA head 7/14/19  In this patient status post A-comm coiling, there is redemonstration of hypoplastic right A1 segment, with subtle narrowing of the proximal left A2 segment which could reflect focal vasospasm.    CT head 7/11/19  Subarachnoid hemorrhage almost completely resolved.  Interval coiling of a A-comm aneurysm.  No vasospasm related complication seen.     CT head w/o contrast 07/04/2019  1. Subarachnoid hemorrhage.  In the setting of no trauma suspect aneurysm    Vessel Imaging      CTA Head. Date: 07/14/19  In this patient status post A-comm coiling, there is redemonstration of hypoplastic right A1 segment, with subtle narrowing of the proximal left A2 segment which could reflect focal vasospasm.      IR angio 07/05/2019  1. 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    2.  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    3.  Distal embolism likely from the access catheter into the distal branches of the anterior middle cerebral artery on the left side treated successfully with intra-arterial Integrilin     CTA head 07/04/2019  Small anterior communicating artery aneurysm likely the origin of the recent subarachnoid bleed.    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 The estimated ejection fraction is 63%  · Concentric left ventricular remodeling.  · No wall motion abnormalities.  · Normal LV diastolic function.  · Normal right ventricular systolic function.  · Mild tricuspid regurgitation.  · Normal central venous pressure (3 mm Hg).  · The estimated PA systolic pressure is 22 mm Hg

## 2019-07-18 NOTE — PLAN OF CARE
Problem: Adult Inpatient Plan of Care  Goal: Plan of Care Review  Outcome: Ongoing (interventions implemented as appropriate)  POC reviewed with pt and pts spouse at 0500. Pt verbalized understanding. Questions and concerns addressed. No acute events overnight. Pt progressing toward goals. Will continue to monitor. See flowsheets for full assessment and VS info

## 2019-07-18 NOTE — PROGRESS NOTES
Report called and given to HARJIT Brooks at 0150. Pt transported down to [facility] RM 7091 with two RN's. Pt transported with portable monitor and ambu bag in bed. Pt safely arrived to new room. VSS. No acute events.

## 2019-07-18 NOTE — PT/OT/SLP DISCHARGE
Occupational Therapy Discharge Summary    Gissel Jamison  MRN: [id number]   Principal Problem: Nontraumatic subarachnoid hemorrhage from anterior communicating artery      Patient Discharged from acute Occupational Therapy on 7/18  Please refer to prior OT note for functional status.    Assessment:      Patient appropriate for care in another setting.    Objective:     GOALS:   Multidisciplinary Problems     Occupational Therapy Goals        Problem: Occupational Therapy Goal    Goal Priority Disciplines Outcome Interventions   Occupational Therapy Goal     OT, PT/OT     Description:  Goals set 7/16 to be addressed for 7 days with expiration date, 7/23:  Patient will increase functional independence with ADLs by performing:  Patient will demonstrate stand pivot transfers with modified independence.   Not met  Patient will demonstrate grooming while standing with modified independence.   Not met  Patient will demonstrate upper body dressing with modified independence.   Not met  Patient will demonstrate lower body dressing with modified independence.   Not met  Patient will demonstrate toileting with modified independence.   Not met  Patient will demonstrate bathing while seated EOB with modified independence.   Not met                         Reasons for Discontinuation of Therapy Services  Transfer to alternate level of care.      Plan:     Patient Discharged to: Home no OT services needed    LARA Batres  7/18/2019

## 2019-07-18 NOTE — PROGRESS NOTES
AVS printed and reviewed with pt and pt's . Pt brought to 's car for discharge. Tolerated transport well. All questions and concerns addressed.

## 2019-07-18 NOTE — ASSESSMENT & PLAN NOTE
63 yo with hx of breast cancer presents to the ED with complaints of intractable headache after a barium CT abdomen on 7/2. CT head on 7/4 with diffuse SAH within basal cisterns and midline- NSY was consulted. CTA on 7/4 revealed small anterior communicating artery aneurysm-. She was taken to IR on 7/5 and is now s/p coiling. Patient is admitted to [facility] for close monitoring.     Mild left CAROLINA territory vasospasm on 7/11 TCD. Worsening vasospasm on 7/15 however neuro exam improved w/o angio intervention. Slightly improving as of TCD on 7/16. Continuing to monitor for exam changes, daily TCDs    Antithrombotics: none (SAH)  Statins: none (SAH)  Aggressive risk factor modification: HLD  Rehab efforts: PT/OT/SLP to eval and treat.   Diagnostics ordered/pending: daily TCDs  VTE prophylaxis: lovenox  BP parameters: secured aneurysm, can liberate blood pressure as needed to prevent vasospasm

## 2019-07-18 NOTE — PLAN OF CARE
07/18/19 1243   Post-Acute Status   Post-Acute Authorization Other   Post-Acute Placement Status Set-up Complete   Other Status No Post-Acute Service Needs   Discharge Delays None known at this time       Katie Munroe RN, CCRN-K, [facility]  Neuro-Critical Care   [phone]

## 2019-07-18 NOTE — PROGRESS NOTES
[facility]  Neurocritical Care  Progress Note    Admit Date: 7/4/2019  Service Date: 07/17/2019  Length of Stay: 13    Subjective:     Chief Complaint: Nontraumatic subarachnoid hemorrhage from anterior communicating artery    History of Present Illness: 64 y F with medical h.o breast cancer s/p mastectomy, lumpectomy, who presented to the ER as a transfer from [facility] as SAH detected on CTH.  Patient's symptoms started on Tuesday 7/2 when she was undergoing a CT abdomen for evaluation of indigestion. She started to feel a pounding and congestion of head and ears. She did not seek medical attention till today 7/4 when she went to urgent care. Urgent care doc did not feel comfortable treating it as migraine and sent patient to ER; where CT head detected SAH. She is transferred here to [facility] main.  Here a CTA detected CAROLINA aneurysm and patient is being sent for angio and further neurosurg intervention,.    Interval history: ruptured ACOM aneurysm now s/p coiling 7/5.  [facility]  On 7/14, stepped back up to Neuro ICU for concern for vasospasm  7/17 No significant events over night. TCD daily no vasospasms noted.    Hospital Course: 07/05/2019 NAEO. For conventional angiogram today ACOM aneurysm on CTA  7/7/19: add lovenox for DVT proph., initiate IVF, PT/OT, liberalize BP <200  07/08/2019 headache. Mild to moderate cerebral VSP.   07/09/2019 NAEON  7/10/2019: increased night time gabapentin, add senna doc, CT head today (ordered per NSGY)-stable, continue TCDs and nimotop, dexamethasone 4 mg once PRN (for headache).   07/12/2019: severe headache overnight, resolved with steroids  7/14: stepped up to neuro ICU, concern for vasospasm. TCDs, bolus of fluids given  07/15/2019: stable exam, headache controlled with steroids  07/16/2019: no acute adverse events overnight  7/17 no acute events over night. Daily TCD, no vasospasms noted    Interval History: 7/17 no  acute events over night. Daily TCD, no vasospasms noted      Review of Systems:   Constitutional: Denies fevers, weight loss, chills, or weakness.  Eyes: Denies changes in vision.  ENT: Denies dysphagia, nasal discharge, ear pain or discharge.  Cardiovascular: Denies chest pain, palpitations, orthopnea, or claudication.  Respiratory: Denies shortness of breath, cough, hemoptysis, or wheezing.  GI: Denies nausea/vomitting, hematochezia, melena, abd pain, or changes in appetite.  : Denies dysuria, incontinence, or hematuria.  Musculoskeletal: Denies joint pain or myalgias.  Skin/breast: Denies rashes, lumps, lesions, or discharge.  Neurologic: + headache, dizziness, vertigo, or paresthesias.  Psychiatric: Denies changes in mood or hallucinations.  Endocrine: Denies polyuria, polydipsia, heat/cold intolerance.  Hematologic/Lymph: Denies lymphadenopathy, easy bruising or easy bleeding.  Allergic/Immunologic: Denies rash, rhinitis.   Vitals:   Temp: 98.7 °F (37.1 °C)  Pulse: 87  Rhythm: normal sinus rhythm  BP: (!) 175/85  MAP (mmHg): 122  Resp: (!) 21  SpO2: 100 %  O2 Device (Oxygen Therapy): room air    Temp  Min: 98.3 °F (36.8 °C)  Max: 99.5 °F (37.5 °C)  Pulse  Min: 68  Max: 104  BP  Min: 120/58  Max: 192/102  MAP (mmHg)  Min: 79  Max: 140  Resp  Min: 11  Max: 28  SpO2  Min: 93 %  Max: 100 %    07/16 0701 - 07/17 0700  In: 3500 [P.O.:500; I.V.:3000]  Out: 3200 [Urine:3200]   Unmeasured Output  Urine Occurrence: 1  Stool Occurrence: 0  Emesis Occurrence: 0  Pad Count: 1     Examination:   Constitutional: Well-nourished and -developed. No apparent distress.   Eyes: Conjunctiva clear, anicteric. Lids no lesions.  Head/Ears/Nose/Mouth/Throat/Neck: Moist mucous membranes. External ears, nose atraumatic.   Cardiovascular: Regular rhythm. No murmurs. No leg edema.  Respiratory: Comfortable respirations. Clear to auscultation.  Gastrointestinal: No hernia. Soft, nondistended, nontender. + bowel sounds.    Neurologic:  -GCS  [id number]  -Alert. Oriented to person, place, and time. Speech fluent. Follows commands.  -Cranial nerves II-XII grossly intact, PERRL 3+ + cough, gag  -Motor 5/5 all extremities .CAMPBELL spontaneously and against gravity  -Sensation bilat intact    Medications:   Continuous  lactated ringers Last Rate: 125 mL/hr at 07/17/19 2105   Scheduled  enoxparin 40 mg Q24H   gabapentin 100 mg TID   gabapentin 200 mg QHS   montelukast 10 mg Daily   niMODipine 60 mg Q4H   pantoprazole 40 mg BID   polyethylene glycol 17 g BID   senna-docusate 8.6-50 mg 1 tablet BID   PRN  acetaminophen 650 mg Q6H PRN   magnesium oxide 800 mg PRN   magnesium oxide 800 mg PRN   ondansetron 4 mg Q6H PRN   potassium chloride 10% 40 mEq PRN   potassium chloride 10% 40 mEq PRN   potassium chloride 10% 60 mEq PRN   potassium, sodium phosphates 2 packet PRN   potassium, sodium phosphates 2 packet PRN   potassium, sodium phosphates 2 packet PRN   simethicone 1 tablet TID PRN   sodium chloride 0.9% 10 mL PRN   tiZANidine 2 mg Nightly PRN      Today I independently reviewed pertinent medications, lines/drains/airways, imaging, laboratory results, microbiology results, notably:     ISTAT: No results for input(s): PH, PCO2, PO2, POCSATURATED, HCO3, BE, POCNA, POCK, POCTCO2, POCGLU, POCICA, POCLAC, SAMPLE in the last 24 hours.   Chem: Recent Labs   Lab 07/17/19 0211      K 3.7      CO2 25   GLU 98   BUN 12   CREATININE 0.7   ESTGFRAFRICA >60.0   EGFRNONAA >60.0   CALCIUM 9.3   MG 2.1   PHOS 4.6*   ANIONGAP 10   PROT 6.2   ALBUMIN 3.0*   BILITOT 0.2   ALKPHOS 111   AST 13   ALT 25     Heme: Recent Labs   Lab 07/17/19 0211   WBC 10.80   HGB 10.1*   HCT 33.0*        Endo: No results for input(s): POCTGLUCOSE in the last 24 hours.   Assessment/Plan:     Neuro  * Nontraumatic subarachnoid hemorrhage from anterior communicating artery  -nontraumatic SAH  -HH2 F2 SAH secondary to ruptured ACOM aneurysm now s/p coiling 7/5  -NSGY following  -daily TCDs,  shows no vasospasm  -Nimotop 60mg q4h  -SBP<200  -continue IVF LR 125cc/h  -PT/OT  -neuro checks q 1 hr  -vasospasm watch  -continue ICU monitoring for 48 more hours minimum  -angio should symptoms develop    Headache  -gabapentin 100mg tid  -dexamethasone 4mg once if needed      Cerebral vasospasm  Continue nimotop   7/14: gave bolus LR and continue @ 125cc/h  , TCD daily   7/16 TCD mild to moderate left CAROLINA vasospasm which is slightly improved from yesterday      Aneurysm of anterior communicating artery  S/p coiling      Oncology  Anemia  Monitor  Transfuse if needed    GI  Fatty liver disease, nonalcoholic  Reported by patient from outside CT scan  Fatty infiltrate of liver seen on abdominal u/s    Transaminitis  Resolved  abd u/s with focal fatty infiltrate  Avoid hepatotoxic agents          The patient is being Prophylaxed for:  Venous Thromboembolism with: Mechanical and chemical  Stress Ulcer with: PPI  Ventilator Pneumonia with: none    Activity Orders          Diet Adult Regular (IDDSI Level 7): Regular starting at 07/14 1558        Full Code     I have spent 35 min with this patient, with over 50% of this time spent coordinating care and speaking with the family    Марина Noland NP  Neurocritical Care  [facility]

## 2019-07-18 NOTE — ASSESSMENT & PLAN NOTE
Area of vasogenic cerebral edema identified when reviewing brain imaging in the subarachnoid space. There is no mass effect associated with it. We will continue to monitor the patients clinical exam for any worsening of symptoms which may indicate expansion of the hemorrhage or the area of the edema resulting in the clinical change. The SAH is likely 2/2 aneurysm rupture.

## 2019-07-22 ENCOUNTER — PATIENT OUTREACH (OUTPATIENT)
Dept: ADMINISTRATIVE | Facility: CLINIC | Age: 64
End: 2019-07-22

## 2019-07-22 NOTE — PATIENT INSTRUCTIONS
Hemorrhagic Stroke: Subarachnoid Hemorrhage  A hemorrhagic stroke happens when a blood vessel in the brain ruptures or leaks. A blood vessel on the surface of the brain bursts (hemorrhages). This spills blood into the surrounding tissue. This type of stroke often happens suddenly, with little warning. It is the most serious of all types of strokes. A subarachnoid hemorrhage is a type of hemorrhagic stroke.     What happens during a subarachnoid hemorrhage?  This type of stroke happens when a major blood vessel bursts on the surface of the brain. Normally, the space between the brain and the skull is filled with a clear fluid. When the blood vessel bursts, blood flows into the space between the brain and the skull. The amount of fluid in this space increases and puts pressure on the brain. This pressure can damage nearby parts of the brain.  Most of these strokes happen when a cerebral aneurysm bursts. An aneurysm is a weak spot in the wall of a blood vessel. A bubble often forms in this weak spot. In some cases, a cerebral aneurysm causes pain or other symptoms. In most cases, though, an aneurysm causes no symptoms until it bursts.  What are the symptoms of a subarachnoid hemorrhage?  Any stroke is a medical emergency. If you have any of these symptoms, even if they seem to get better, call 911 right away:  · Sudden, excruciating headache with no known cause  · Nausea and vomiting (often with headache)  · Sudden confusion or decrease in alertness  · Trouble moving or loss of feeling, especially on the face, arm, or leg specifically on one side of the body  · Sudden trouble walking, dizziness, loss of balance or coordination  · Sudden trouble talking or understanding speech    · Sudden mood changes  · Sudden dimness, double vision, or loss of vision, particularly in one eye  · Eyes suddenly very sensitive to light  · Neck and shoulder pain or stiff neck  · Seizure  How is a hemorrhagic stroke treated?   The acute  phase lasts from the first minutes to hours after symptoms start. During this phase, treatment focuses on relieving pressure on the brain and preventing further damage. A procedure or surgery may be done to repair the ruptured (burst) aneurysm. A drain may be placed into the brain to relieve pressure. Medicines to control blood pressure may be given through an IV line. Seizure medicine is often given. Tests will likely be done to check for other aneurysms in the brain. If they are found, surgery may be done to reduce the risk that they will burst and bleed.  After the acute phase, treatment focuses on recovery. Long-term damage from a stroke can include paralysis, trouble speaking or understanding, and trouble thinking clearly. Rehabilitation therapy (rehab) can help reduce these effects and regain skills. Rehab starts in the hospital and generally continues in an inpatient or outpatient facility, and eventually at home. It will focus on regaining lost skills. It may include:  · Help regaining movement.  · Therapy for speech and language  · Help with swallowing  · Help reducing risk factors for another stroke, such as smoking  In addition, medicines that help prevent another stroke may be given. These include medicines to control blood pressure and prevent bleeding.  Date Last Reviewed: 8/26/2015  © 7480-2823 [employer]. [address] [place] [address], [place] [address]. All rights reserved. This information is not intended as a substitute for professional medical care. Always follow your healthcare professional's instructions.

## 2019-07-22 NOTE — PROGRESS NOTES
Please forward this important TCC information to your provider in order to maximize the post discharge care delivery of this patient.    C3 nurse spoke with Gissel Jamison for a TCC post hospital discharge follow up call. The patient have a scheduled HOSFU appointment with Non [facility] PCP, Dr Huerta for  post hospital discharge date 07/18/2019. C3 nurse was unable to schedule HOSFU appointment in [facility].  Please contact patient and schedule follow up appointment using HOSFU visit type on or before 08/02/2019. Patient appt is for 08/07/2019. Patient instructed to call a f/u hosp appt on or before 08/01/2019.    Respectfully,    Lisbeth Leblanc LPN    [facility]    [email or web address]       Please do not reply to this message, as this inbox is not routinely monitored.

## 2019-07-25 NOTE — PROGRESS NOTES
[facility]  Neurosurgery  Progress Note    Subjective:     History of Present Illness: 63 yo with hx of breast cancer presents to the ED with complaints of headache after a barium CT abdomen on Tuesday. Reports intractable headache which began 2 days ago. Associated n/v, neck pain. Denies visual changes, weakness, paresthesias, b/b dysfunction. No trauma, falls, or LOC. She does not take antiplatelet therapy or anticoagulants. Currently on a cardene drip started at the outside hospital. She states she is tired from the morphine given prior to transport. Family at bedside.       Post-Op Info:  Procedure(s) (LRB):  ANGIOGRAM-CEREBRAL, acomm aneurysm (N/A)   9 Days Post-Op     Interval History: TCDs WNL yesterday however altered this AM, drowsy, awakens to voice but cannot maintain attention. Continues to c/o headache and back stiffness. No pain meds given yet this AM. Difficulty following commands especially in RLE. Stat 1L bolus and CTA ordered.    CTA with evidence of R A1-2 vasospasm. Discussed need for angio with NCC/step up to unit.     Medications:  Continuous Infusions:   lactated ringers 100 mL/hr at 07/14/19 0657     Scheduled Meds:   enoxparin  40 mg Subcutaneous Q24H    gabapentin  100 mg Oral TID    gabapentin  200 mg Oral QHS    montelukast  10 mg Oral Daily    niMODipine  60 mg Oral Q4H    pantoprazole  40 mg Oral BID    senna-docusate 8.6-50 mg  1 tablet Oral BID    sodium chloride 0.9% bolus  1,000 mL Intravenous Once     PRN Meds:butalbital-acetaminophen-caffeine -40 mg, diazePAM, magnesium oxide, magnesium oxide, ondansetron, potassium chloride 10%, potassium chloride 10%, potassium chloride 10%, potassium, sodium phosphates, potassium, sodium phosphates, potassium, sodium phosphates, ramelteon, simethicone, sodium chloride 0.9%     Review of Systems  Objective:     Weight: 69.9 kg (154 lb)  Body mass index is 25.63 kg/m².  Vital Signs (Most Recent):  Temp: 99.9 °F  (37.7 °C) (07/14/19 1000)  Pulse: 109 (07/14/19 1129)  Resp: 20 (07/14/19 1000)  BP: (!) 185/95 (07/14/19 1000)  SpO2: 96 % (07/14/19 1000) Vital Signs (24h Range):  Temp:  [97.6 °F (36.4 °C)-99.9 °F (37.7 °C)] 99.9 °F (37.7 °C)  Pulse:  [] 109  Resp:  [17-20] 20  SpO2:  [95 %-97 %] 96 %  BP: (122-186)/(58-95) 185/95                          Neurosurgery Physical Exam   Drowsy, awakens to voice; will not participate w/orientation questions  PERRL, EOMI, face symm, tongue midline  SILT  No drift  Grossly full strength in BUE, LLE  Unable to DF/PF RLE - unclear if weakness v attention    Significant Labs:  Recent Labs   Lab 07/13/19  0351 07/14/19  0621    105    137   K 3.9 3.9    101   CO2 26 22*   BUN 13 11   CREATININE 0.7 0.7   CALCIUM 9.1 9.3   MG 2.1 2.2     Recent Labs   Lab 07/13/19  0351 07/14/19  0621   WBC 8.94 12.23   HGB 11.0* 11.6*   HCT 38.1 37.4    329     Significant Diagnostics:  I have reviewed and interpreted all pertinent imaging results/findings within the past 24 hours.   Cta Head    Result Date: 7/14/2019  In this patient status post A-comm coiling, there is redemonstration of hypoplastic right A1 segment, with subtle narrowing of the proximal left A2 segment which could reflect focal vasospasm. This report was flagged in Epic as abnormal. Electronically signed by resident: Wilfredo Schaeffer MD Date:    07/14/2019 Time:    07:51 Electronically signed by: Georges Lemons MD Date:    07/14/2019 Time:    09:39      Assessment/Plan:     Aneurysm of anterior communicating artery  64 F who presented with severe HA, neck pain, vomiting and found to have HH2 F2 SAH secondary to ruptured ACOM aneurysm now s/p coiling 7/5:    PBD 11 PCD9    Neuro:  --Neurostepdown --> step up to NCC      -Q2h neurochecks while in [place]  --HOB@30  --Keppra 500 BID x7d (complete)  --TCDs daily x14d (AM pending)  --Nimotop 60q4 x 21d  --Pain control - HA relieved by steroid IV in past; will  trial Fioricet  --Continue to follow clinically and notify NSGY immediately if any neurostatus change  STAT CTA REVIEWED, [facility] FOR ANGIOGRAM    CVS:  ---220 mmHg (cardene ggt; hydralazine & labetalol PRN)  --Echo normal    Pulm:  --on RA    GIT/Electrolytes:  --CMP daily      -Replete electrolytes PRN  --Na goal >135    Renal:  --Strict I/Os  --Goal of euvolemia or net +1L  - MIVF  - STAT 1L bolus and increase MIVF    Heme/ID:  --Daily CBC      -Transfuse PRN  --Trend WBC and T    PPx:  --TEDs/SCDs  --Lovenox  --PPI    Dispo: Transfer to [facility]        Lizbeth Packer MD  Neurosurgery  [facility]        Addendum:    Patient doing well this afternoon s/p fluid bolus. Will return to [facility] for q1h neurochecks. Will continue to push medical therapy and hold on angiogram.     Discussed w/Dr. Ku and Dr. Morgan Packer MD  Neurosurgery  [facility]     Ambulatory

## 2019-08-05 ENCOUNTER — OFFICE VISIT (OUTPATIENT)
Dept: NEUROSURGERY | Facility: CLINIC | Age: 64
End: 2019-08-05
Payer: COMMERCIAL

## 2019-08-05 VITALS
WEIGHT: 151.63 LBS | SYSTOLIC BLOOD PRESSURE: 146 MMHG | HEART RATE: 94 BPM | DIASTOLIC BLOOD PRESSURE: 72 MMHG | HEIGHT: 65 IN | BODY MASS INDEX: 25.26 KG/M2

## 2019-08-05 DIAGNOSIS — I67.1 ANEURYSM OF ANTERIOR COMMUNICATING ARTERY: Primary | ICD-10-CM

## 2019-08-05 DIAGNOSIS — I60.2 NONTRAUMATIC SUBARACHNOID HEMORRHAGE FROM ANTERIOR COMMUNICATING ARTERY: ICD-10-CM

## 2019-08-05 PROCEDURE — 99999 PR PBB SHADOW E&M-EST. PATIENT-LVL III: CPT | Mod: PBBFAC,,,

## 2019-08-05 PROCEDURE — 3008F PR BODY MASS INDEX (BMI) DOCUMENTED: ICD-10-PCS | Mod: CPTII,S$GLB,, | Performed by: PSYCHIATRY & NEUROLOGY

## 2019-08-05 PROCEDURE — 3008F BODY MASS INDEX DOCD: CPT | Mod: CPTII,S$GLB,, | Performed by: PSYCHIATRY & NEUROLOGY

## 2019-08-05 PROCEDURE — 99215 OFFICE O/P EST HI 40 MIN: CPT | Mod: S$GLB,,, | Performed by: PSYCHIATRY & NEUROLOGY

## 2019-08-05 PROCEDURE — 99999 PR PBB SHADOW E&M-EST. PATIENT-LVL III: ICD-10-PCS | Mod: PBBFAC,,,

## 2019-08-05 PROCEDURE — 99215 PR OFFICE/OUTPT VISIT, EST, LEVL V, 40-54 MIN: ICD-10-PCS | Mod: S$GLB,,, | Performed by: PSYCHIATRY & NEUROLOGY

## 2019-08-05 NOTE — PROGRESS NOTES
[facility]  Neurosurgery Department  Neurointerventional  Clinic Note      Reason for Consult (Chief Complaint): SAH    SUBJECTIVE:     History of Present Illness:  Patient is a 64 y.o. female who presents to clinic today as a follow up after presenting to ED w SAH caused by aneurysm which was embolized during her stay.  Has not had new event and her main complaint is tiredness throughout the day.      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 NSGY consulted for SAH found on CT head.  - Neurologically stable on initial assessment. HH2F2  - Stat CTA head ordered - CT head @11am reviewed. Diffuse SAH within basal cisterns and midline falx. No evidence for hydrocephalus on this study. No mass effect or midline shift.     PONV (postoperative nausea and vomiting)     for general anesthesia     Past Surgical History:   Procedure Laterality Date    ANGIOGRAM-CEREBRAL, acomm aneurysm N/A 7/5/2019    [facility] by [facility] at [facility]    Arm Surgery      torn tendon    BACK SURGERY      x2    HYSTERECTOMY      RENETTA    MASTECTOMY      with reconstruction left breast    OOPHORECTOMY      RADIOFREQUENCY THERMAL COAGULATION, NERVE, SPINAL, CERVICAL, POSTERIOR RAMUS, MEDIAL BRANCH Left 2/8/2019    Performed [facility] at [facility]    Tonsillectomy      TONSILLECTOMY       Family History   Problem Relation Age of Onset    Breast cancer Neg Hx     Ovarian cancer Neg Hx      Social History     Tobacco Use    Smoking status: Never Smoker    Smokeless tobacco: Never Used   Substance Use Topics    Alcohol use: No    Drug use: No     Review of patient's allergies indicates:   Allergen Reactions    Codeine Nausea  "And Vomiting    Pcn [penicillins] Nausea And Vomiting    Strawberry     Sulfa (sulfonamide antibiotics) Nausea And Vomiting    Sulfanilamide        Medications:   I have reviewed the current medication administration record.  For a complete list of medications please see the medication list.    Review of Systems:  Constitutional: no fever, no chills, no fatigue  Eyes: no eyesight worsening, no double vision, no eye pain  ENT/Mouth: no nasal congestion or nose bleeds, no sore throat or hoarseness.  Cardiovascular: no chest pain w/exertion, no palpitations, no leg pain while walking, no irregular heartbeat  Respiratory: no cough or shortness of breath, no coughing up blood  Gastrointestinal: no nausea or vomiting, no abdominal pain or change in bowel habits, no black/bloody stools  Genitourinary: no frequent voiding or blood in urine  Musculoskeletal: no joint pain, no muscle pain   Skin/Breast: negative for rash or skin lesions  Hematologic: no easy bruising  Neurological: positive for tiredness and mild inability to focus  Sleep: negative for snoring or breath holding  Psychiatric: no auditory or visual hallucinations, no anxiety, no depression/worrying alot  Endocrine: no heat or cold intolerance, no excessive thirst    OBJECTIVE:     Vital Signs (Most Recent):  BP (!) 146/72   Pulse 94   Ht 5' 5" (1.651 m)   Wt 68.8 kg (151 lb 9.6 oz)   BMI 25.23 kg/m²     Physical Exam:  General: well developed, well nourished  Head/Neck: atraumatic, thyroid normal, no palpable mass  Eyes: fundus normal, no disc edema, no vessel changes  Respiratory: clear to auscultation  Vascular: no carotid bruits  Cardiac: regular rate and rhythm  Abdomen: soft, non-tender    Neurological Exam:   LOC: alert and follows requests  Language: No aphasia  Speech: No dysarthria  Orientation: Person, Place, Time  Memory: Recent memory intact, Remote memory intact, Age correct, Month correct  Visual Fields (CN II): Full  EOM (CN III, IV, VI): " Full/intact  Oculocephalics: normal  Pupils (CN III, IV, VI): PERRL  Facial Sensation (CN V): Symmetric, Corneal reflex intact  Facial Movement (CN VII): symmetric facial expression  Hearing (CN VIII): intact bilaterally  Gag Reflex (CN IX, X): normal/symmetric  Shoulder/Neck (CN XI): SCM-Left: Normal ; SCM-Right: Normal ; Shoulder Shrug: Normal/Symetric  Tongue (CN XII): to midline  Reflexes: flexor plantar responses bilaterally and 2+ throughout  Motor*: Arm Left:  Normal (5/5), Leg Left:   Normal (5/5), Arm Right:   Normal (5/5), Leg Right:   Normal (5/5)  Cerebellar*: Normal limb, Normal gait  , Normal stance  Sensation: intact to light touch, temperature and vibration  Tone: Arm-Left: normal; Leg-Left: normal; Arm-Right: normal; Leg-Right: normal    Laboratory:  BMP:   Lab Results   Component Value Date     07/18/2019    K 4.1 07/18/2019     07/18/2019    CO2 24 07/18/2019    BUN 11 07/18/2019    CREATININE 0.7 07/18/2019    CALCIUM 9.5 07/18/2019     CBC:   Lab Results   Component Value Date    WBC 9.48 07/18/2019    RBC 3.69 (L) 07/18/2019    HGB 10.1 (L) 07/18/2019    HCT 32.4 (L) 07/18/2019    HCT 33 (L) 07/05/2019     07/18/2019    MCV 88 07/18/2019    MCH 27.4 07/18/2019    MCHC 31.2 (L) 07/18/2019     Lipid Panel:   Lab Results   Component Value Date    CHOL 256 (H) 07/05/2019    LDLCALC 179.2 (H) 07/05/2019    HDL 55 07/05/2019    TRIG 109 07/05/2019     Coagulation:   Lab Results   Component Value Date    INR 1.1 07/04/2019    APTT 33.0 (H) 07/04/2019     Hgb A1C:   Lab Results   Component Value Date    HGBA1C 5.9 (H) 07/05/2019     TSH:   Lab Results   Component Value Date    TSH 1.831 11/13/2013       Diagnostic Results:  Brain Imaging: CT Head. Date: 7.2019  SAH    ASSESSMENT/PLAN:     Diagnosis:  Aneurysmal subarachnoid hemorrhage.  Aneurysm treated with coil embolization    Recommendations:  Follow-up diagnostic angiography before the end of the year to reassess aneurysm.       Nathanael Lopez MD  Vascular and Interventional Neurology Staff  Director of [facility]  Departments of Neurology, Radiology & Neurosurgery  [facility]  [phone]

## 2019-08-15 ENCOUNTER — OFFICE VISIT (OUTPATIENT)
Dept: NEUROSURGERY | Facility: CLINIC | Age: 64
End: 2019-08-15
Payer: COMMERCIAL

## 2019-08-15 ENCOUNTER — HOSPITAL ENCOUNTER (OUTPATIENT)
Dept: RADIOLOGY | Facility: HOSPITAL | Age: 64
Discharge: HOME | End: 2019-08-15
Attending: STUDENT IN AN ORGANIZED HEALTH CARE EDUCATION/TRAINING PROGRAM
Payer: COMMERCIAL

## 2019-08-15 VITALS
WEIGHT: 152.63 LBS | DIASTOLIC BLOOD PRESSURE: 67 MMHG | HEART RATE: 91 BPM | BODY MASS INDEX: 25.43 KG/M2 | TEMPERATURE: 98 F | HEIGHT: 65 IN | SYSTOLIC BLOOD PRESSURE: 122 MMHG

## 2019-08-15 DIAGNOSIS — I67.1 ANEURYSM OF ANTERIOR COMMUNICATING ARTERY: ICD-10-CM

## 2019-08-15 DIAGNOSIS — I60.8 SUBARACHNOID HEMORRHAGE DUE TO RUPTURED ANEURYSM: Primary | ICD-10-CM

## 2019-08-15 PROCEDURE — 99999 PR PBB SHADOW E&M-EST. PATIENT-LVL III: CPT | Mod: PBBFAC,,, | Performed by: NEUROLOGICAL SURGERY

## 2019-08-15 PROCEDURE — 70450 CT HEAD/BRAIN W/O DYE: CPT | Mod: 26,,, | Performed by: RADIOLOGY

## 2019-08-15 PROCEDURE — 99213 PR OFFICE/OUTPT VISIT, EST, LEVL III, 20-29 MIN: ICD-10-PCS | Mod: S$GLB,,, | Performed by: NEUROLOGICAL SURGERY

## 2019-08-15 PROCEDURE — 70450 CT HEAD/BRAIN W/O DYE: CPT | Mod: TC

## 2019-08-15 PROCEDURE — 70450 CT HEAD WITHOUT CONTRAST: ICD-10-PCS | Mod: 26,,, | Performed by: RADIOLOGY

## 2019-08-15 PROCEDURE — 3008F BODY MASS INDEX DOCD: CPT | Mod: CPTII,S$GLB,, | Performed by: NEUROLOGICAL SURGERY

## 2019-08-15 PROCEDURE — 99999 PR PBB SHADOW E&M-EST. PATIENT-LVL III: ICD-10-PCS | Mod: PBBFAC,,, | Performed by: NEUROLOGICAL SURGERY

## 2019-08-15 PROCEDURE — 99213 OFFICE O/P EST LOW 20 MIN: CPT | Mod: S$GLB,,, | Performed by: NEUROLOGICAL SURGERY

## 2019-08-15 PROCEDURE — 3008F PR BODY MASS INDEX (BMI) DOCUMENTED: ICD-10-PCS | Mod: CPTII,S$GLB,, | Performed by: NEUROLOGICAL SURGERY

## 2019-08-15 RX ORDER — MONTELUKAST SODIUM 10 MG/1
TABLET ORAL
COMMUNITY
Start: 2019-08-13

## 2019-08-15 RX ORDER — LEVOCETIRIZINE DIHYDROCHLORIDE 5 MG/1
TABLET, FILM COATED ORAL
COMMUNITY

## 2019-08-15 RX ORDER — TIZANIDINE 2 MG/1
TABLET ORAL
COMMUNITY
Start: 2019-07-18 | End: 2019-08-18

## 2019-08-15 RX ORDER — ESZOPICLONE 3 MG/1
TABLET, FILM COATED ORAL
COMMUNITY
Start: 2019-08-07 | End: 2019-10-08

## 2019-08-15 NOTE — PROGRESS NOTES
This office note has been dictated.  Gissel Jamison returned in neurosurgical followup to the office this morning.  She   is a 64-year-old lady who sustained a subarachnoid hemorrhage, probably about   July 1st.  This persisted and she came to the hospital on 07/04/19 where CT and   CTA showed a subarachnoid hemorrhage, mainly in the interhemispheric fissure and   a small anterior communicating artery aneurysm.  Cerebral angiography confirmed   the aneurysm and this was coiled by Dr. Lopez on 07/05/19.  She did well in the   postoperative period.  She did not show vasospasm or other complications.  She   did not require ventriculostomy and was able to be discharged to home.  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  She has been regulating her time   depending on how she feels.  She notes some difficulty with concentration and   general sharpness and also feels that she has a little more difficulty focusing   her vision.    On brief examination today, she is bright and alert and speaking clearly.  She   shows full extraocular movements and equal pupils.  Fundi appear unremarkable.    I do not see any hemorrhage in the retina.  Hearing is good.  Facial sensation   and movement are normal.  Gait and coordination are generally intact.    CT scan of the head was done at [facility] today.  Ventricular size is   normal.  The blood has cleared.  The coiled aneurysm is seen.  Overall, the CT   is completely satisfactory.  I did review the CT done at the time of the   subarachnoid hemorrhage and her angiogram with her.    I will plan a followup angiography toward the end of the year and I will see her   back then.      ELROY/LEXIS  dd: 08/15/2019 11:48:36 (CDT)  td: 08/16/2019 02:56:11 (CDT)  Doc ID   #[id number]  Job ID #[id number]    CC: Gissel Jamison

## 2019-08-26 ENCOUNTER — TELEPHONE (OUTPATIENT)
Dept: NEUROLOGY | Facility: HOSPITAL | Age: 64
End: 2019-08-26

## 2019-08-26 NOTE — TELEPHONE ENCOUNTER
----- Message from Miller Gamino sent at 8/26/2019 11:26 AM CDT -----  Contact: pt @ [phone]  Gissel is asking to speak w/ the nurse about scheduling angiogram

## 2019-08-26 NOTE — TELEPHONE ENCOUNTER
Call returned Mrs. Jamison explained that she was calling to schedule her angiogram the week of Thanksgiving with Dr. Lopez. I informed her that I will get him to place an order once entered I'll get her message over to Interventional Radiology for scheduling.       Dr. Lopez,    Please place an order for an angiogram.      Thanks,  Sania

## 2019-09-10 ENCOUNTER — TELEPHONE (OUTPATIENT)
Dept: NEUROLOGY | Facility: HOSPITAL | Age: 64
End: 2019-09-10

## 2019-09-10 NOTE — TELEPHONE ENCOUNTER
----- Message from Karla Bhagat sent at 9/9/2019 11:14 AM CDT -----  Contact: Pt. [phone]  The patient would like to speak to someone regarding scheduling her angiogram. Please contact the patient to discuss further.

## 2019-09-10 NOTE — TELEPHONE ENCOUNTER
Call returned/Spoke with pt-    She advised me that was calling to schedule her angiogram the week of Thanksgiving. I explained to Mrs. Jamison that I would message Dr. Lopez and request her enter a order for her angiogram, once entered I will message the IR staff and request scheduling.

## 2019-09-30 ENCOUNTER — TELEPHONE (OUTPATIENT)
Dept: NEUROLOGY | Facility: CLINIC | Age: 64
End: 2019-09-30

## 2019-09-30 NOTE — TELEPHONE ENCOUNTER
"Call placed to pt. States she had a rhizotomy done in March (looks as though it was actually done in February). She has this procedure done every so often for neck pain, and it usually lasts for a year or more. States that since her angiogram and aneurysm coiling in July, the neck pain has returned (sooner than expected) and is much worse than it was even before the rhizotomy. Pt is wondering if the increase in pain could be related to the angiogram "undoing" something in regards to the ablation. She would also like to know if there is any contraindication to having another rhizotomy in regards to the coils.    Advised pt that all likely an unfortunate coincidence but will check with providers.  "

## 2019-09-30 NOTE — TELEPHONE ENCOUNTER
Call returned to pt. Advised that Dr Lopez states nothing to do with coiling and no contraindication if another ablation is ordered. Verbalizes understanding.

## 2019-09-30 NOTE — TELEPHONE ENCOUNTER
----- Message from Katiana Valdez sent at 9/30/2019 11:17 AM CDT -----  Contact: Gissel Marcial stated she's having some pain in her head and neck she wanted to speak with someone in the office about. Pt contact number is [phone]

## 2019-10-14 ENCOUNTER — TELEPHONE (OUTPATIENT)
Dept: NEUROSURGERY | Facility: CLINIC | Age: 64
End: 2019-10-14

## 2019-10-14 NOTE — TELEPHONE ENCOUNTER
----- Message from Katie Carrillo sent at 10/14/2019  2:12 PM CDT -----  Contact: Self [phone]  PT called to schedule an angiogram the week of Thanksgiving. She can be reached at [phone].

## 2019-10-16 ENCOUNTER — TELEPHONE (OUTPATIENT)
Dept: NEUROSURGERY | Facility: CLINIC | Age: 64
End: 2019-10-16

## 2019-10-16 DIAGNOSIS — I60.2 NONTRAUMATIC SUBARACHNOID HEMORRHAGE FROM ANTERIOR COMMUNICATING ARTERY: ICD-10-CM

## 2019-10-16 DIAGNOSIS — I67.1 ANEURYSM OF ANTERIOR COMMUNICATING ARTERY: Primary | ICD-10-CM

## 2019-10-16 DIAGNOSIS — I60.8 SUBARACHNOID HEMORRHAGE DUE TO RUPTURED ANEURYSM: ICD-10-CM

## 2019-10-17 ENCOUNTER — TELEPHONE (OUTPATIENT)
Dept: NEUROLOGY | Facility: CLINIC | Age: 64
End: 2019-10-17

## 2019-10-17 NOTE — TELEPHONE ENCOUNTER
----- Message from Tori Arredondo sent at 10/17/2019 10:57 AM CDT -----  Contact: Pt.Self   Patient Requesting Sooner Appointment.     Reason for sooner appt.:  Follow up     When is the first available appointment?  Schedule unavailable per epic @ time of call     Communication Preference:  [phone]     Additional Information:  Pt. Prefers to be scheduled any day anytime on the week of Thanksgiving if possible     Thank You

## 2019-10-18 DIAGNOSIS — I60.8 SUBARACHNOID HEMORRHAGE DUE TO RUPTURED ANEURYSM: ICD-10-CM

## 2019-10-18 DIAGNOSIS — I60.2 NONTRAUMATIC SUBARACHNOID HEMORRHAGE FROM ANTERIOR COMMUNICATING ARTERY: ICD-10-CM

## 2019-10-18 DIAGNOSIS — I67.1 ANEURYSM OF ANTERIOR COMMUNICATING ARTERY: Primary | ICD-10-CM

## 2019-11-22 RX ORDER — HEPARIN SODIUM 1000 [USP'U]/ML
3000 INJECTION, SOLUTION INTRAVENOUS; SUBCUTANEOUS ONCE
Status: CANCELLED | OUTPATIENT
Start: 2019-11-22 | End: 2019-11-22

## 2019-11-22 RX ORDER — FENTANYL CITRATE 50 UG/ML
50 INJECTION, SOLUTION INTRAMUSCULAR; INTRAVENOUS
Status: CANCELLED | OUTPATIENT
Start: 2019-11-22

## 2019-11-22 RX ORDER — MIDAZOLAM HYDROCHLORIDE 1 MG/ML
1 INJECTION INTRAMUSCULAR; INTRAVENOUS
Status: CANCELLED | OUTPATIENT
Start: 2019-11-22

## 2019-11-25 ENCOUNTER — HOSPITAL ENCOUNTER (OUTPATIENT)
Facility: HOSPITAL | Age: 64
Discharge: HOME | End: 2019-11-25
Attending: PSYCHIATRY & NEUROLOGY | Admitting: PSYCHIATRY & NEUROLOGY
Payer: COMMERCIAL

## 2019-11-25 VITALS
DIASTOLIC BLOOD PRESSURE: 72 MMHG | RESPIRATION RATE: 16 BRPM | HEART RATE: 90 BPM | WEIGHT: 150 LBS | SYSTOLIC BLOOD PRESSURE: 143 MMHG | TEMPERATURE: 99 F | BODY MASS INDEX: 24.99 KG/M2 | OXYGEN SATURATION: 97 % | HEIGHT: 65 IN

## 2019-11-25 DIAGNOSIS — I67.1 ANEURYSM OF ANTERIOR COMMUNICATING ARTERY: ICD-10-CM

## 2019-11-25 DIAGNOSIS — I60.2 NONTRAUMATIC SUBARACHNOID HEMORRHAGE FROM ANTERIOR COMMUNICATING ARTERY: ICD-10-CM

## 2019-11-25 DIAGNOSIS — I60.8 SUBARACHNOID HEMORRHAGE DUE TO RUPTURED ANEURYSM: ICD-10-CM

## 2019-11-25 LAB
ALBUMIN SERPL BCP-MCNC: 3.8 G/DL (ref 3.5–5.2)
ALP SERPL-CCNC: 144 U/L (ref 55–135)
ALT SERPL W/O P-5'-P-CCNC: 28 U/L (ref 10–44)
ANION GAP SERPL CALC-SCNC: 10 MMOL/L (ref 8–16)
AST SERPL-CCNC: 18 U/L (ref 10–40)
BASOPHILS # BLD AUTO: 0.02 K/UL (ref 0–0.2)
BASOPHILS NFR BLD: 0.3 % (ref 0–1.9)
BILIRUB SERPL-MCNC: 0.6 MG/DL (ref 0.1–1)
BUN SERPL-MCNC: 13 MG/DL (ref 8–23)
CALCIUM SERPL-MCNC: 9 MG/DL (ref 8.7–10.5)
CHLORIDE SERPL-SCNC: 106 MMOL/L (ref 95–110)
CO2 SERPL-SCNC: 26 MMOL/L (ref 23–29)
CREAT SERPL-MCNC: 0.8 MG/DL (ref 0.5–1.4)
DIFFERENTIAL METHOD: ABNORMAL
EOSINOPHIL # BLD AUTO: 0 K/UL (ref 0–0.5)
EOSINOPHIL NFR BLD: 0.2 % (ref 0–8)
ERYTHROCYTE [DISTWIDTH] IN BLOOD BY AUTOMATED COUNT: 13.9 % (ref 11.5–14.5)
EST. GFR  (AFRICAN AMERICAN): >60 ML/MIN/1.73 M^2
EST. GFR  (NON AFRICAN AMERICAN): >60 ML/MIN/1.73 M^2
GLUCOSE SERPL-MCNC: 163 MG/DL (ref 70–110)
HCT VFR BLD AUTO: 41.8 % (ref 37–48.5)
HGB BLD-MCNC: 12.6 G/DL (ref 12–16)
IMM GRANULOCYTES # BLD AUTO: 0.02 K/UL (ref 0–0.04)
IMM GRANULOCYTES NFR BLD AUTO: 0.3 % (ref 0–0.5)
INR PPP: 1 (ref 0.8–1.2)
LYMPHOCYTES # BLD AUTO: 1 K/UL (ref 1–4.8)
LYMPHOCYTES NFR BLD: 16.5 % (ref 18–48)
MCH RBC QN AUTO: 26.5 PG (ref 27–31)
MCHC RBC AUTO-ENTMCNC: 30.1 G/DL (ref 32–36)
MCV RBC AUTO: 88 FL (ref 82–98)
MONOCYTES # BLD AUTO: 0.6 K/UL (ref 0.3–1)
MONOCYTES NFR BLD: 9.3 % (ref 4–15)
NEUTROPHILS # BLD AUTO: 4.4 K/UL (ref 1.8–7.7)
NEUTROPHILS NFR BLD: 73.4 % (ref 38–73)
NRBC BLD-RTO: 0 /100 WBC
PLATELET # BLD AUTO: 165 K/UL (ref 150–350)
PMV BLD AUTO: 9.4 FL (ref 9.2–12.9)
POTASSIUM SERPL-SCNC: 3.5 MMOL/L (ref 3.5–5.1)
PROT SERPL-MCNC: 7.1 G/DL (ref 6–8.4)
PROTHROMBIN TIME: 10 SEC (ref 9–12.5)
RBC # BLD AUTO: 4.75 M/UL (ref 4–5.4)
SODIUM SERPL-SCNC: 142 MMOL/L (ref 136–145)
WBC # BLD AUTO: 6 K/UL (ref 3.9–12.7)

## 2019-11-25 PROCEDURE — 85025 COMPLETE CBC W/AUTO DIFF WBC: CPT

## 2019-11-25 PROCEDURE — 85610 PROTHROMBIN TIME: CPT

## 2019-11-25 PROCEDURE — 63600175 PHARM REV CODE 636 W HCPCS: Performed by: FAMILY MEDICINE

## 2019-11-25 PROCEDURE — 80053 COMPREHEN METABOLIC PANEL: CPT

## 2019-11-25 PROCEDURE — 63600175 PHARM REV CODE 636 W HCPCS: Performed by: PSYCHIATRY & NEUROLOGY

## 2019-11-25 PROCEDURE — 25500020 PHARM REV CODE 255: Performed by: PSYCHIATRY & NEUROLOGY

## 2019-11-25 RX ORDER — ONDANSETRON 2 MG/ML
4 INJECTION INTRAMUSCULAR; INTRAVENOUS ONCE
Status: COMPLETED | OUTPATIENT
Start: 2019-11-25 | End: 2019-11-25

## 2019-11-25 RX ORDER — IODIXANOL 320 MG/ML
100 INJECTION, SOLUTION INTRAVASCULAR
Status: COMPLETED | OUTPATIENT
Start: 2019-11-25 | End: 2019-11-25

## 2019-11-25 RX ORDER — SODIUM CHLORIDE 9 MG/ML
INJECTION, SOLUTION INTRAVENOUS CONTINUOUS
Status: DISCONTINUED | OUTPATIENT
Start: 2019-11-25 | End: 2019-11-25 | Stop reason: HOSPADM

## 2019-11-25 RX ORDER — SODIUM CHLORIDE 0.9 % (FLUSH) 0.9 %
10 SYRINGE (ML) INJECTION
Status: DISCONTINUED | OUTPATIENT
Start: 2019-11-25 | End: 2019-11-25 | Stop reason: HOSPADM

## 2019-11-25 RX ORDER — FENTANYL CITRATE 50 UG/ML
INJECTION, SOLUTION INTRAMUSCULAR; INTRAVENOUS CODE/TRAUMA/SEDATION MEDICATION
Status: COMPLETED | OUTPATIENT
Start: 2019-11-25 | End: 2019-11-25

## 2019-11-25 RX ORDER — MIDAZOLAM HYDROCHLORIDE 1 MG/ML
INJECTION INTRAMUSCULAR; INTRAVENOUS CODE/TRAUMA/SEDATION MEDICATION
Status: COMPLETED | OUTPATIENT
Start: 2019-11-25 | End: 2019-11-25

## 2019-11-25 RX ADMIN — IODIXANOL 25 ML: 320 INJECTION, SOLUTION INTRAVASCULAR at 09:11

## 2019-11-25 RX ADMIN — MIDAZOLAM HYDROCHLORIDE 1 MG: 1 INJECTION, SOLUTION INTRAMUSCULAR; INTRAVENOUS at 08:11

## 2019-11-25 RX ADMIN — FENTANYL CITRATE 50 MCG: 50 INJECTION, SOLUTION INTRAMUSCULAR; INTRAVENOUS at 08:11

## 2019-11-25 RX ADMIN — ONDANSETRON 4 MG: 2 INJECTION INTRAMUSCULAR; INTRAVENOUS at 08:11

## 2019-11-25 RX ADMIN — FENTANYL CITRATE 25 MCG: 50 INJECTION, SOLUTION INTRAMUSCULAR; INTRAVENOUS at 08:11

## 2019-11-25 RX ADMIN — SODIUM CHLORIDE: 0.9 INJECTION, SOLUTION INTRAVENOUS at 06:11

## 2019-11-25 NOTE — H&P
Radiology History & Physical      SUBJECTIVE:     Chief Complaint: cerebral angiogram for Acomm aneurysm treated with coiling 7/5/2019    History of Present Illness:  Gissel Jamison is a 64 y.o. female who presents for follow up cerebral angiogram for Acomm aneurysm treated with coiling ON 7/5/19    Past Medical History:   Diagnosis Date    Asthma     Breast cancer     Fibromyalgia     Nontraumatic subarachnoid hemorrhage from anterior communicating artery 7/4/2019    65 yo with hx of breast cancer presents to the ED with complaints of intractable headache after a barium CT abdomen on 7/2. NSGY consulted for SAH found on CT head.  - Neurologically stable on initial assessment. HH2F2  - Stat CTA head ordered - CT head @11am reviewed. Diffuse SAH within basal cisterns and midline falx. No evidence for hydrocephalus on this study. No mass effect or midline shift.     PONV (postoperative nausea and vomiting)     for general anesthesia     Past Surgical History:   Procedure Laterality Date    Arm Surgery      torn tendon    BACK SURGERY      x2    CEREBRAL ANGIOGRAM N/A 7/5/2019    Procedure: ANGIOGRAM-CEREBRAL, acomm aneurysm;  Surgeon: Turner Mckeon MD;  Location[facility];  Service: Neurosurgery;  Laterality: N/A;    HYSTERECTOMY      RENETTA    MASTECTOMY      with reconstruction left breast    OOPHORECTOMY      RADIOFREQUENCY THERMAL COAGULATION OF MEDIAL BRANCH OF POSTERIOR RAMUS OF CERVICAL SPINAL NERVE Left 2/8/2019    Procedure: RADIOFREQUENCY THERMAL COAGULATION, NERVE, SPINAL, CERVICAL, POSTERIOR RAMUS, MEDIAL BRANCH;  Surgeon: Raymond Britton MD;  Location: [facility];  Service: Pain Management;  Laterality: Left;    Tonsillectomy      TONSILLECTOMY         Home Meds:   Prior to Admission medications    Medication Sig Start Date End Date Taking? Authorizing Provider   levocetirizine (XYZAL) 5 MG tablet Take 5 mg by mouth once daily.   Yes Historical Provider, MD   montelukast (SINGULAIR)  10 mg tablet TAKE 1 TABLET BY ORAL ROUTE EVERY DAY IN THE EVENING 8/13/19  Yes Historical Provider, MD   pantoprazole (PROTONIX) 40 MG tablet Take 40 mg by mouth 2 (two) times daily.  5/22/18  Yes Historical Provider, MD     Anticoagulants/Antiplatelets: no anticoagulation    Allergies:   Review of patient's allergies indicates:   Allergen Reactions    Codeine Nausea And Vomiting    Pcn [penicillins] Nausea And Vomiting    Strawberry     Sulfa (sulfonamide antibiotics) Nausea And Vomiting    Sulfanilamide      Sedation History:  no adverse reactions    Review of Systems:   Hematological: no known coagulopathies  Respiratory: no shortness of breath  Cardiovascular: no chest pain  Gastrointestinal: no abdominal pain  Genito-Urinary: no dysuria  Musculoskeletal: negative  Neurological: no TIA or stroke symptoms         OBJECTIVE:     Vital Signs (Most Recent)  Pulse: 103 (11/25/19 0807)  Resp: 12 (11/25/19 0807)  BP: (!) 176/86 (11/25/19 0807)  SpO2: 100 % (11/25/19 0807)    Physical Exam:  ASA: 2  Mallampati: 2    General: no acute distress  Mental Status: alert and oriented to person, place and time  HEENT: normocephalic, atraumatic  Chest: unlabored breathing  Heart: regular heart rate  Abdomen: nondistended  Extremity: moves all extremities    Laboratory  Lab Results   Component Value Date    INR 1.0 11/25/2019       Lab Results   Component Value Date    WBC 6.00 11/25/2019    HGB 12.6 11/25/2019    HCT 41.8 11/25/2019    MCV 88 11/25/2019     11/25/2019      Lab Results   Component Value Date     (H) 11/25/2019     11/25/2019    K 3.5 11/25/2019     11/25/2019    CO2 26 11/25/2019    BUN 13 11/25/2019    CREATININE 0.8 11/25/2019    CALCIUM 9.0 11/25/2019    MG 2.2 07/18/2019    ALT 28 11/25/2019    AST 18 11/25/2019    ALBUMIN 3.8 11/25/2019    BILITOT 0.6 11/25/2019       ASSESSMENT/PLAN:     Sedation Plan: Moderate sedation  Patient will undergo follow up cerebral  angiogram    Konrad Oro MD  NeuroIR fellow.

## 2019-11-25 NOTE — PROGRESS NOTES
Pt arrived to [facility] via stretcher on RA, pt alert, pt received Zofran 4MG IV for nausea, pt verbalized relief, right groin dressing CDI, no bleeding, no hematoma, pt denies HA, report received from Amy LOMBARDI

## 2019-11-25 NOTE — PLAN OF CARE
Pt arrived to ir 190 forcerebral angiogram. Pt oriented to unit and staff. Plan of care reviewed with patient, patient verbalizes understanding. Comfort measures utilized. Pt safely transferred from stretcher to procedural table. Fall risk reviewed with patient, fall risk interventions maintained. Safety strap applied, positioner pillows utilized to minimize pressure points. Blankets applied. Pt prepped and draped utilizing standard sterile technique. Patient placed on continuous monitoring, as required by sedation policy. Timeouts completed utilizing standard universal time-out, per department and facility policy. RN to remain at bedside, continuous monitoring maintained. Pt resting comfortably. Denies pain/discomfort. Will continue to monitor. See flow sheets for monitoring, medication administration, and updates.

## 2019-11-25 NOTE — PROGRESS NOTES
Procedure complete. Patient tolerated well. Right groin site closed with 6fr angioseal. Dressing clean dry and intact. Patient to remain on bedrest x2 hours with hob flat.Patient to recover in rocu accompanied per ir nursing staff. Report given at bedside.

## 2019-11-25 NOTE — PROCEDURES
Neurointerventional Radiology Post-Procedure Note    Pre Op Diagnosis: Coiled Acomm aneurysm     Post Op Diagnosis: Same as above    Procedure: Cerebral angiogram    Procedure performed by: John MARKHAM, Nathanael Oro MD.    Written Informed Consent Obtained: Yes    Specimen Removed: NO    Estimated Blood Loss: less than 50     Procedure report:     A 5F sheath was placed into the right femoral artery and a 5F Elier catheter was advanced into the aortic arch.  The Left ICA was subselected and angiography of the brain was performed after injection into the vessel    Preliminary interpretation: Small residual base of the previously coiled Acomm aneurysm.  Please see Imaging report for full details.    A right femoral artery angiogram was performed, the sheath removed and hemostasis achieved using 6F angioseal.  No hematoma was present at the time of hemostasis.    The patient tolerated the procedure well.     Konrad Oro MD  NeuroInterventional Radiology Fellow

## 2019-11-25 NOTE — DISCHARGE SUMMARY
Radiology Discharge Summary      Hospital Course: No complications    Admit Date: 11/25/2019  Discharge Date: 11/25/2019     Instructions Given to Patient: Yes  Diet: Resume prior diet  Activity: activity as tolerated    Description of Condition on Discharge: Stable  Vital Signs (Most Recent): Pulse: 100 (11/25/19 0835)  Resp: 13 (11/25/19 0835)  BP: (!) 164/77 (11/25/19 0835)  SpO2: 99 % (11/25/19 0835)    Discharge Disposition: Home    Discharge Diagnosis: Small residual base of previously coiled Acomm aneurysm     Follow-up: cerebral angiogram in 6 months.     Konrad Oro MD  NeuroIR fellow

## 2019-11-25 NOTE — NURSING
Pt &  received dc instructions, piv dcd, right groin dressing CDI, no bleeding, no hematoma, pt ambulated to the bathroom in NAD, pt &  left via WC with  & escort in [facility], MD Lopez notified of above

## 2020-01-29 ENCOUNTER — TELEPHONE (OUTPATIENT)
Dept: NEUROLOGY | Facility: CLINIC | Age: 65
End: 2020-01-29

## 2020-01-29 NOTE — TELEPHONE ENCOUNTER
----- Message from Janeen Ward sent at 1/29/2020  4:26 PM CST -----  Contact: self  Type:  Sooner Apoointment Request    Caller is requesting a sooner appointment.  Caller declined first available appointment listed below.  Caller will not accept being placed on the waitlist and is requesting a message be sent to doctor.  Name of Caller:self  When is the first available appointment? nothing available  Symptoms:headaches  Would the patient rather a call back or a response via [facility]? Call back  Best Call Back Number: [phone]  Additional Information: Pt needs appt to see doctor

## 2020-01-30 NOTE — TELEPHONE ENCOUNTER
----- Message from Margoth Zuniga sent at 1/30/2020  3:07 PM CST -----  Contact: pt   Patient Requesting Sooner Appointment.     Reason for sooner appt.: pt is calling to speak with the nurse pt left a message yesterday pt is coming in for a fu pt is having headaches pt had surgery July 5,2019  When is the first available appointment? N/A   Communication Preference: can you please call pt at [phone]  Additional Information: none    ROBERT

## 2020-02-13 ENCOUNTER — OFFICE VISIT (OUTPATIENT)
Dept: NEUROLOGY | Facility: CLINIC | Age: 65
End: 2020-02-13
Payer: COMMERCIAL

## 2020-02-13 VITALS
HEIGHT: 65 IN | SYSTOLIC BLOOD PRESSURE: 144 MMHG | HEART RATE: 89 BPM | BODY MASS INDEX: 24.99 KG/M2 | DIASTOLIC BLOOD PRESSURE: 87 MMHG | WEIGHT: 150 LBS

## 2020-02-13 DIAGNOSIS — Z98.890 H/O CEREBRAL ANEURYSM REPAIR: Primary | ICD-10-CM

## 2020-02-13 DIAGNOSIS — G89.29 CHRONIC INTRACTABLE HEADACHE, UNSPECIFIED HEADACHE TYPE: ICD-10-CM

## 2020-02-13 DIAGNOSIS — R51.9 CHRONIC INTRACTABLE HEADACHE, UNSPECIFIED HEADACHE TYPE: ICD-10-CM

## 2020-02-13 DIAGNOSIS — Z86.79 H/O CEREBRAL ANEURYSM REPAIR: Primary | ICD-10-CM

## 2020-02-13 PROCEDURE — 99214 PR OFFICE/OUTPT VISIT, EST, LEVL IV, 30-39 MIN: ICD-10-PCS | Mod: S$GLB,,, | Performed by: NURSE PRACTITIONER

## 2020-02-13 PROCEDURE — 3008F PR BODY MASS INDEX (BMI) DOCUMENTED: ICD-10-PCS | Mod: CPTII,S$GLB,, | Performed by: NURSE PRACTITIONER

## 2020-02-13 PROCEDURE — 99999 PR PBB SHADOW E&M-EST. PATIENT-LVL III: ICD-10-PCS | Mod: PBBFAC,,, | Performed by: NURSE PRACTITIONER

## 2020-02-13 PROCEDURE — 99214 OFFICE O/P EST MOD 30 MIN: CPT | Mod: S$GLB,,, | Performed by: NURSE PRACTITIONER

## 2020-02-13 PROCEDURE — 99999 PR PBB SHADOW E&M-EST. PATIENT-LVL III: CPT | Mod: PBBFAC,,, | Performed by: NURSE PRACTITIONER

## 2020-02-13 PROCEDURE — 3008F BODY MASS INDEX DOCD: CPT | Mod: CPTII,S$GLB,, | Performed by: NURSE PRACTITIONER

## 2020-02-13 NOTE — LETTER
February 13, 2020      Nathanael Lopez MD  [address] [facility] [address]           [facility]  [facility] [facility]  [facility] [address]  Phone: [phone]          Patient: Gissel Jamison   MR Number: [id number]   YOB: 1955   Date of Visit: 2/13/2020       Dear Dr. Nathanael Lopez:    Thank you for referring Gissel Jamison to me for evaluation. Attached you will find relevant portions of my assessment and plan of care.    If you have questions, please do not hesitate to call me. I look forward to following Gissel Jamison along with you.    Sincerely,    Tabatha Landaverde, BRANDY    Enclosure  CC:  No Recipients    If you would like to receive this communication electronically, please contact [email or web address] or [phone] to request more information on [employer] access.    For providers and/or their staff who would like to refer a patient to [facility], please contact us through our one-stop-shop provider referral line, [facility], at [phone].    If you feel you have received this communication in error or would no longer like to receive these types of communications, please e-mail [email or web address]

## 2020-02-13 NOTE — PROGRESS NOTES
SUBJECTIVE:     History for Present Illness: Gissel Jamison is a 64 y.o. female who present to me today for a follow-up assessment and recommendations . She was hospitalized in July following a non-traumatic SAH with successful embolization of her anterior communicating artery . Gissel has been doing well since her discharge. Her only complain is HA since November. Gissel states she had HA prior to her SAH that was associated with previous neck injury, however her current  HA are different in location and quality.They are locate in occipital area radiating into the pariental region; pain 8/10 . She verbalizes some relief with tylenol. Gissel denies weakness, numbness, dizziness, diff with word finding, or visual field deficits. Nl gait. Nl balance. We discussed Gissel imaging findings and plan of care recommendations. She has no new concerns at this time.      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 NSGY consulted for SAH found on CT head.  - Neurologically stable on initial assessment. HH2F2  - Stat CTA head ordered - CT head @11am reviewed. Diffuse SAH within basal cisterns and midline falx. No evidence for hydrocephalus on this study. No mass effect or midline shift.     PONV (postoperative nausea and vomiting)     for general anesthesia     Past Surgical History:   Procedure Laterality Date    Arm Surgery      torn tendon    BACK SURGERY      x2    CEREBRAL ANGIOGRAM N/A 7/5/2019    Procedure: ANGIOGRAM-CEREBRAL, acomm aneurysm;  Surgeon: Turner Mckeon MD;  [facility];  Service: Neurosurgery;  Laterality: N/A;    CEREBRAL ANGIOGRAM N/A 11/25/2019    Procedure: ANGIOGRAM-CEREBRAL;  Surgeon: [facility] Diagnostic Provider;  [facility];  Service: Radiology;  Laterality: N/A;  /John    HYSTERECTOMY      RENETTA    MASTECTOMY      with reconstruction left breast    OOPHORECTOMY      RADIOFREQUENCY THERMAL COAGULATION OF MEDIAL BRANCH OF POSTERIOR RAMUS OF CERVICAL SPINAL NERVE Left 2/8/2019    Procedure: RADIOFREQUENCY THERMAL COAGULATION, NERVE, SPINAL, CERVICAL, POSTERIOR RAMUS, MEDIAL BRANCH;  Surgeon: Raymond Britton MD;  Location[facility];  Service: Pain Management;  Laterality: Left;    Tonsillectomy      TONSILLECTOMY       Family History   Problem Relation Age of Onset    Breast cancer Neg Hx     Ovarian cancer Neg Hx      Past Surgical History:   Procedure Laterality Date    Arm Surgery      torn tendon    BACK SURGERY      x2    CEREBRAL ANGIOGRAM N/A 7/5/2019    Procedure: ANGIOGRAM-CEREBRAL, acomm aneurysm;  Surgeon: Turner Mckeon MD;  Location: [facility];  Service: Neurosurgery;  Laterality: N/A;    CEREBRAL ANGIOGRAM N/A 11/25/2019    Procedure: ANGIOGRAM-CEREBRAL;  [facility];  Location: [address] [facility];  Service: Radiology;  Laterality: N/A;  /John    HYSTERECTOMY      RENETTA    MASTECTOMY      with reconstruction left breast    OOPHORECTOMY      RADIOFREQUENCY THERMAL COAGULATION OF MEDIAL BRANCH OF POSTERIOR RAMUS OF CERVICAL SPINAL NERVE Left 2/8/2019    Procedure: RADIOFREQUENCY THERMAL COAGULATION, NERVE, SPINAL, CERVICAL, POSTERIOR RAMUS, MEDIAL BRANCH;  Surgeon: Raymond Britton MD;  Location: [facility];  Service: Pain Management;  Laterality: Left;    Tonsillectomy      TONSILLECTOMY         Current Outpatient Medications:     levocetirizine (XYZAL) 5 MG tablet, Take 5 mg by mouth once daily., Disp: , Rfl:     montelukast (SINGULAIR) 10 mg tablet, TAKE 1 TABLET BY ORAL ROUTE EVERY DAY IN THE EVENING, Disp: , Rfl:     pantoprazole (PROTONIX) 40 MG tablet, Take 40 mg by mouth 2 (two) times daily. , Disp: , Rfl: 2  No current facility-administered medications  "for this visit.     Facility-Administered Medications Ordered in Other Visits:     sodium chloride 0.9% flush 10 mL, 10 mL, Intravenous, PRN, Raymond Britton MD       Review of Systems:  Constitution: negative for lethargy and weakness; no recent changes in weight  Eyes: no eyesight worsening; no double vision; no eye pain  ENT/Mouth: no nasal congestion or nose bleeds; no sore throat or hoarseness  Cardiovascular:  no chest pain with exertion; no palpitations  Respiratory: Normal effort and rate; no coughing  Gastrointestinal: No N/V; negative abdominal pain; no changes in bowel habits  Genitourinary:  no increased frequency in voiding or incontinence  Musculoskeletal:  No joint pain or swelling; no myalgia   Skin:  negative for skin rash or lesions  Hematologic: negative for bruising  Neurologic: Positive headache, negative dizziness; negative confusion        OBJECTIVE:     BP (!) 144/87   Pulse 89   Ht 5' 5" (1.651 m)   Wt 68 kg (150 lb)   BMI 24.96 kg/m²      Physical Exam   Constitution: She  appears well nourished. No distress   LOC: Alert and follows request  Head: Normocephalic and atraumatic.   Cardiovascular: Normal rate. Intact distal pulses  Pulmonary/Chest: Effort normal. No respiratory distress  Musculoskeletal: Negative joint swelling or tenderness. No range of motion restrictions elsewhere about the body except that noted in the history of present illness.    Neurologic Exam:  Orientation: person, place and time  Language: No aphasia  Speech: No dysarthria  Memory: Recent memory intact; Remote memory intact; age correct; month correct  Visual Fields (CN II):  Full  EOM (CN III, IV, VI): Full intact   Pupils (CN II, III): PERRL  Facial Sensation (CN V): symmetric  Facial Movement (CN VII): Symmetrical facial expressions   Hearing (CN VIII):  Intact bilaterally   Shoulder/Neck (CN XI): SCM-Left: Normal; SCM-Right: Normal; Shoulder Shrug: Normal/Symetric  Tongue (CN XII): to " midline  Reflexes: flexor plantar responses bilaterally and 2+ throughout  Motor: Arm Left:  Normal (5/5), Leg Left: Normal (5/5), Arm Right: Normal (5/5), Leg Right: Normal (5/5)  Cerebellar: Normal limb, Normal gait, Normal stance  Sensation: intact to light touch  Diagnostic Results:  1. Diagnostic cerebral angiogram 11/25/19 : Stable and unchanged from previous imaging. Small residual at the base of previously embolized anterior communicating aneurysm.     Assessment:  Gissel Jamison  is a 64 y.o.  female with a history of asthma, nontraumatic SAH and successful embolization of aneurysm.She is seen today in clinic for follow-up assessment and recommendations. The following recommendations and plan were discussed in depth with  the patient who voiced understanding and was in agreement.  Plan:  1. Headache   - It is unlikely that this is related to her previously repaired aneurysm , however will get CT head to rule out other pathology including hydrocephalus.   - Ambulatory referral to neurology (headache clinic) for management of persistent HA  2. SAH with successful embolization of anterior communicating aneurysm.     - Aggressive risk factor modification: HTN, Diet, Exercise              - Cerebral angiogram: Small area of residual at the base of previously embolized anterior communicating aneurysm. Will schedule patient for flow diverter                  (MICHELLE) in April.                                                                                                                                                                                                                                                       - BP : Follow up with PCP for BP control for CV risk reduction  3. Patient/Family teaching  -A significant amount of time was spent reviewing the patient's stroke risk factors and the importance to modify them in order to reduce the risk of future events.  Also, the importance of a healthy diet and  daily exercise in order to reduce the risk of future strokes.     -We discussed the usual stroke warning signs and symptoms, and the need to activate EMS (call 911) as soon as the symptoms present.  - Sudden onset numbness or weakness of the face, arm, or leg;  especially on one side of the body  - Sudden confusion, trouble speaking, or understanding  - Sudden trouble seeing in one or both eyes  - Sudden trouble walking, dizziness, loss of coordination  - Sudden severe headache with no known cause  4. I will plan on having Gissel return to clinic as need. I have given the patient my contact information. She can contact my    office with any questions or concerns@ may have as they arise in the interim.         Tabatha Landaverde, NP-C  Department of Vascular Neurology  [facility]  [phone]

## 2020-02-26 ENCOUNTER — TELEPHONE (OUTPATIENT)
Dept: PHYSICAL MEDICINE AND REHAB | Facility: CLINIC | Age: 65
End: 2020-02-26

## 2020-02-26 ENCOUNTER — TELEPHONE (OUTPATIENT)
Dept: NEUROLOGY | Facility: CLINIC | Age: 65
End: 2020-02-26

## 2020-02-26 NOTE — TELEPHONE ENCOUNTER
----- Message from Valentín Nielson sent at 2/26/2020  1:18 PM CST -----  Contact: Pt  Patient called stating she spoke with her insurance company regarding authorization for her CT Scan orders, she says they informed her they never recieved the orders for approval

## 2020-02-26 NOTE — TELEPHONE ENCOUNTER
----- Message from Kaya Rees sent at 2/26/2020 11:03 AM CST -----  Contact: pt called [phone]  Calling to f/u on if [employer] gave the approval.

## 2020-02-27 ENCOUNTER — PATIENT MESSAGE (OUTPATIENT)
Dept: NEUROSURGERY | Facility: CLINIC | Age: 65
End: 2020-02-27

## 2020-03-05 ENCOUNTER — HOSPITAL ENCOUNTER (OUTPATIENT)
Dept: RADIOLOGY | Facility: HOSPITAL | Age: 65
Discharge: HOME | End: 2020-03-05
Attending: NURSE PRACTITIONER
Payer: COMMERCIAL

## 2020-03-05 DIAGNOSIS — Z98.890 H/O CEREBRAL ANEURYSM REPAIR: ICD-10-CM

## 2020-03-05 DIAGNOSIS — Z86.79 H/O CEREBRAL ANEURYSM REPAIR: ICD-10-CM

## 2020-03-05 PROCEDURE — 70450 CT HEAD/BRAIN W/O DYE: CPT | Mod: 26,,, | Performed by: RADIOLOGY

## 2020-03-05 PROCEDURE — 70450 CT HEAD/BRAIN W/O DYE: CPT | Mod: TC

## 2020-03-05 PROCEDURE — 70450 CT HEAD WITHOUT CONTRAST: ICD-10-PCS | Mod: 26,,, | Performed by: RADIOLOGY

## 2020-03-18 ENCOUNTER — TELEPHONE (OUTPATIENT)
Dept: NEUROSURGERY | Facility: CLINIC | Age: 65
End: 2020-03-18

## 2020-03-18 DIAGNOSIS — I67.1 ANEURYSM OF ANTERIOR COMMUNICATING ARTERY: Primary | ICD-10-CM

## 2020-03-19 NOTE — TELEPHONE ENCOUNTER
----- Message from Larry Dyer MA sent at 3/19/2020 10:21 AM CDT -----  Can you please place order? Pt would like to do procedure on 4/22/20.     [facility]  ----- Message -----  From: Kristel Mitchell MA  Sent: 3/11/2020   3:44 PM CDT  To: Larry Dyer MA    We can go ahead and start scheduling these pts.  Thank You  ----- Message -----  From: Tabatha Landaverde NP  Sent: 2/13/2020  12:00 PM CDT  To: JOSE Batres Dr would like to do a flow diverter on this lady . He wants to use the new MICHELLE device so wont be able to schedule till sometime in April.    Isabel

## 2020-04-21 ENCOUNTER — TELEPHONE (OUTPATIENT)
Dept: NEUROLOGY | Facility: CLINIC | Age: 65
End: 2020-04-21

## 2020-04-27 ENCOUNTER — PATIENT MESSAGE (OUTPATIENT)
Dept: NEUROSURGERY | Facility: CLINIC | Age: 65
End: 2020-04-27

## 2020-04-30 ENCOUNTER — PATIENT MESSAGE (OUTPATIENT)
Dept: NEUROLOGY | Facility: CLINIC | Age: 65
End: 2020-04-30

## 2020-05-29 ENCOUNTER — PATIENT MESSAGE (OUTPATIENT)
Dept: NEUROLOGY | Facility: CLINIC | Age: 65
End: 2020-05-29

## 2020-06-05 ENCOUNTER — TELEPHONE (OUTPATIENT)
Dept: NEUROSURGERY | Facility: CLINIC | Age: 65
End: 2020-06-05

## 2020-06-15 ENCOUNTER — OFFICE VISIT (OUTPATIENT)
Dept: NEUROSURGERY | Facility: CLINIC | Age: 65
End: 2020-06-15
Payer: COMMERCIAL

## 2020-06-15 VITALS
DIASTOLIC BLOOD PRESSURE: 75 MMHG | WEIGHT: 157.88 LBS | HEART RATE: 97 BPM | HEIGHT: 65 IN | BODY MASS INDEX: 26.3 KG/M2 | TEMPERATURE: 97 F | SYSTOLIC BLOOD PRESSURE: 164 MMHG

## 2020-06-15 DIAGNOSIS — G44.59 OTHER COMPLICATED HEADACHE SYNDROME: ICD-10-CM

## 2020-06-15 DIAGNOSIS — I67.1 ANEURYSM OF ANTERIOR COMMUNICATING ARTERY: Primary | ICD-10-CM

## 2020-06-15 PROBLEM — I67.848 CEREBRAL VASOSPASM: Status: RESOLVED | Noted: 2019-07-08 | Resolved: 2020-06-15

## 2020-06-15 PROBLEM — I60.2: Status: RESOLVED | Noted: 2019-07-04 | Resolved: 2020-06-15

## 2020-06-15 PROBLEM — G93.6 VASOGENIC CEREBRAL EDEMA: Status: RESOLVED | Noted: 2019-07-18 | Resolved: 2020-06-15

## 2020-06-15 PROCEDURE — 99215 PR OFFICE/OUTPT VISIT, EST, LEVL V, 40-54 MIN: ICD-10-PCS | Mod: S$GLB,,, | Performed by: PSYCHIATRY & NEUROLOGY

## 2020-06-15 PROCEDURE — 99999 PR PBB SHADOW E&M-EST. PATIENT-LVL III: ICD-10-PCS | Mod: PBBFAC,,, | Performed by: PSYCHIATRY & NEUROLOGY

## 2020-06-15 PROCEDURE — 99999 PR PBB SHADOW E&M-EST. PATIENT-LVL III: CPT | Mod: PBBFAC,,, | Performed by: PSYCHIATRY & NEUROLOGY

## 2020-06-15 PROCEDURE — 3008F BODY MASS INDEX DOCD: CPT | Mod: CPTII,S$GLB,, | Performed by: PSYCHIATRY & NEUROLOGY

## 2020-06-15 PROCEDURE — 3008F PR BODY MASS INDEX (BMI) DOCUMENTED: ICD-10-PCS | Mod: CPTII,S$GLB,, | Performed by: PSYCHIATRY & NEUROLOGY

## 2020-06-15 PROCEDURE — 99215 OFFICE O/P EST HI 40 MIN: CPT | Mod: S$GLB,,, | Performed by: PSYCHIATRY & NEUROLOGY

## 2020-06-15 RX ORDER — ESZOPICLONE 3 MG/1
TABLET, FILM COATED ORAL
COMMUNITY
Start: 2020-06-14

## 2020-06-15 NOTE — ASSESSMENT & PLAN NOTE
- ruptured A-comm aneurysm status post coil embolization with small residual base.  - on further close evaluation,  flow diverted cannot be deployed safety.  - will perform follow-up cerebral angiogram to evaluate for any increase in size of the residual base.  - patient is claustrophobic and does will have to assess followups with cerebral angiogram rather than MR angiogram.

## 2020-06-15 NOTE — PROGRESS NOTES
Interventional Neurosurgery  Clinic Note    ___________________  ASSESSMENT & PLAN    Problem List Items Addressed This Visit        Neuro    Aneurysm of anterior communicating artery - Primary    Current Assessment & Plan     - ruptured A-comm aneurysm status post coil embolization with small residual base.  - on further close evaluation,  flow diverted cannot be deployed safety.  - will perform follow-up cerebral angiogram to evaluate for any increase in size of the residual base.  - patient is claustrophobic and does will have to assess followups with cerebral angiogram rather than MR angiogram.           Relevant Orders    IR Angiogram Carotid Cerebral Bilateral    Headache    Current Assessment & Plan     - headache at the vertex of the skull approximately every other day along with frontal bandlike headache daily.  - will refer to headache Clinic for further evaluation and treatment.               Reason For Visit (Chief Complaint):  Follow-up for ruptured a-comm aneurysms status post coil embolization.    HPI: 65 y.o.  female presented to the clinic for follow-up post ruptured A-comm aneurysm coil embolization.    Patient had ruptured A-comm aneurysm in the July 2019 which was treated with coils.  Follow-up angiogram in November 2019 revealed small residual neck of the aneurysm.  On my neuro observation of the admitting of the blood vessels, flow evertor is not amenable considering diminutive lumen of left CAROLINA A1 segment.    Patient complains of sinus headache v/s headache at the vertex of the skull.  Denies any evidence of thunderclap headache.        Vessel Imaging:  Ruptured A-comm aneurysm status post coil embolization with small residual base.        Relevant Labwork:  Recent Labs   Lab 07/05/19  0110   Hemoglobin A1C 5.9 H   LDL Cholesterol 179.2 H   HDL 55   Triglycerides 109   Cholesterol 256 H       I independently viewed the above imaging studies in addition to reviewing the report.  I  reviewed the above labwork.    Review of Systems   Constitutional: Negative for chills and fever.   HENT: Negative for hearing loss and tinnitus.    Eyes: Negative for blurred vision and double vision.   Respiratory: Negative for cough and hemoptysis.    Cardiovascular: Negative for chest pain and palpitations.   Gastrointestinal: Negative for heartburn and nausea.   Genitourinary: Negative for dysuria and urgency.   Musculoskeletal: Positive for back pain and joint pain. Negative for myalgias and neck pain.   Skin: Negative for itching and rash.   Neurological: Negative for dizziness and headaches.   Endo/Heme/Allergies: Negative for environmental allergies. Does not bruise/bleed easily.   Psychiatric/Behavioral: Negative for depression and suicidal ideas.     Past Medical History  Past Medical History:   Diagnosis Date    Asthma     Breast cancer     Fibromyalgia     Nontraumatic subarachnoid hemorrhage from anterior communicating artery 7/4/2019    63 yo with hx of breast cancer presents to the ED with complaints of intractable headache after a barium CT abdomen on 7/2. NSGY consulted for SAH found on CT head.  - Neurologically stable on initial assessment. HH2F2  - Stat CTA head ordered - CT head @11am reviewed. Diffuse SAH within basal cisterns and midline falx. No evidence for hydrocephalus on this study. No mass effect or midline shift.     PONV (postoperative nausea and vomiting)     for general anesthesia     Family History  No relevant history   Social History  Never Smoker     Medication List with Changes/Refills   Current Medications    ESZOPICLONE (LUNESTA) 3 MG TAB        LEVOCETIRIZINE (XYZAL) 5 MG TABLET    Take 5 mg by mouth once daily.    MONTELUKAST (SINGULAIR) 10 MG TABLET    TAKE 1 TABLET BY ORAL ROUTE EVERY DAY IN THE EVENING    PANTOPRAZOLE (PROTONIX) 40 MG TABLET    Take 40 mg by mouth 2 (two) times daily.        EXAM  Vital Signs:Blood pressure (!) 164/75, pulse 97, temperature 97 °F  "(36.1 °C), height 5' 5" (1.651 m), weight 71.6 kg (157 lb 13.6 oz).  General: well appearing without discomfort   Mental Status:alert, oriented to person - place - age - month   Language: able to name, repeat, comprehend commands   Cranial Nerves: EOMI, PERRL, no facial asymmetry, tongue to midline, palate midline  Motor: 5/5 power in all extremities, normal tone  Sensory: intact light touch bilaterally  Coordination: no ataxia   Gait & Stance: normal        Konrad Oro MD  NeuroIR fellow    "

## 2020-06-15 NOTE — ASSESSMENT & PLAN NOTE
- headache at the vertex of the skull approximately every other day along with frontal bandlike headache daily.  - will refer to headache Clinic for further evaluation and treatment.

## 2020-06-19 DIAGNOSIS — I67.1 ANEURYSM OF ANTERIOR COMMUNICATING ARTERY: Primary | ICD-10-CM

## 2020-06-30 DIAGNOSIS — I67.1 ANEURYSM OF ANTERIOR COMMUNICATING ARTERY: Primary | ICD-10-CM

## 2020-06-30 RX ORDER — MIDAZOLAM HYDROCHLORIDE 1 MG/ML
1 INJECTION INTRAMUSCULAR; INTRAVENOUS
Status: CANCELLED | OUTPATIENT
Start: 2020-06-30

## 2020-06-30 RX ORDER — FENTANYL CITRATE 50 UG/ML
50 INJECTION, SOLUTION INTRAMUSCULAR; INTRAVENOUS
Status: CANCELLED | OUTPATIENT
Start: 2020-06-30

## 2020-06-30 RX ORDER — HEPARIN SODIUM 1000 [USP'U]/ML
5000 INJECTION, SOLUTION INTRAVENOUS; SUBCUTANEOUS ONCE
Status: CANCELLED | OUTPATIENT
Start: 2020-06-30 | End: 2020-06-30

## 2020-06-30 NOTE — NURSING
Pre-op call complete.     Pre procedure instructions given for cerebral angiogram. Pt instructed not to eat or drink after midnight. Allergies reviewed.  Pts , Edwin to provide transport and monitor pt 8 hrs. Home medications reviewed with patient. Pt instructed to check in to second floor radiology/lab desk to have blood work drawn at 9 am then to proceed to [facility] waiting area. Pt denied insulin use and postioning restrictions (HOB flat 2-4 hrs).  Pt reports she tolerated procedure well in November.  Expected length of stay reviewed.  Covid screening complete.  Pt verbalizes understanding. Questions answered.

## 2020-07-01 ENCOUNTER — HOSPITAL ENCOUNTER (OUTPATIENT)
Facility: HOSPITAL | Age: 65
Discharge: HOME | End: 2020-07-01
Attending: PSYCHIATRY & NEUROLOGY | Admitting: PSYCHIATRY & NEUROLOGY
Payer: COMMERCIAL

## 2020-07-01 VITALS
HEIGHT: 65 IN | RESPIRATION RATE: 18 BRPM | WEIGHT: 158 LBS | OXYGEN SATURATION: 100 % | HEART RATE: 82 BPM | DIASTOLIC BLOOD PRESSURE: 72 MMHG | BODY MASS INDEX: 26.33 KG/M2 | SYSTOLIC BLOOD PRESSURE: 156 MMHG | TEMPERATURE: 98 F

## 2020-07-01 DIAGNOSIS — N39.0 URINARY TRACT INFECTION WITHOUT HEMATURIA, SITE UNSPECIFIED: Primary | ICD-10-CM

## 2020-07-01 DIAGNOSIS — I67.1 ANEURYSM OF ANTERIOR COMMUNICATING ARTERY: ICD-10-CM

## 2020-07-01 LAB
BACTERIA #/AREA URNS AUTO: ABNORMAL /HPF
BILIRUB UR QL STRIP: NEGATIVE
CLARITY UR REFRACT.AUTO: CLEAR
COLOR UR AUTO: YELLOW
GLUCOSE UR QL STRIP: NEGATIVE
HGB UR QL STRIP: ABNORMAL
HYALINE CASTS UR QL AUTO: 1 /LPF
KETONES UR QL STRIP: NEGATIVE
LEUKOCYTE ESTERASE UR QL STRIP: ABNORMAL
MICROSCOPIC COMMENT: ABNORMAL
NITRITE UR QL STRIP: NEGATIVE
PH UR STRIP: 7 [PH] (ref 5–8)
PROT UR QL STRIP: NEGATIVE
RBC #/AREA URNS AUTO: 24 /HPF (ref 0–4)
SP GR UR STRIP: 1.01 (ref 1–1.03)
SQUAMOUS #/AREA URNS AUTO: 1 /HPF
URN SPEC COLLECT METH UR: ABNORMAL
WBC #/AREA URNS AUTO: 13 /HPF (ref 0–5)

## 2020-07-01 PROCEDURE — 25000003 PHARM REV CODE 250: Performed by: PSYCHIATRY & NEUROLOGY

## 2020-07-01 PROCEDURE — 81001 URINALYSIS AUTO W/SCOPE: CPT

## 2020-07-01 PROCEDURE — 63600175 PHARM REV CODE 636 W HCPCS: Performed by: STUDENT IN AN ORGANIZED HEALTH CARE EDUCATION/TRAINING PROGRAM

## 2020-07-01 PROCEDURE — 25500020 PHARM REV CODE 255: Performed by: PSYCHIATRY & NEUROLOGY

## 2020-07-01 PROCEDURE — 25000003 PHARM REV CODE 250: Performed by: PHYSICIAN ASSISTANT

## 2020-07-01 RX ORDER — SODIUM CHLORIDE 0.9 % (FLUSH) 0.9 %
10 SYRINGE (ML) INJECTION
Status: DISCONTINUED | OUTPATIENT
Start: 2020-07-01 | End: 2020-07-01 | Stop reason: HOSPADM

## 2020-07-01 RX ORDER — ONDANSETRON 2 MG/ML
INJECTION INTRAMUSCULAR; INTRAVENOUS CODE/TRAUMA/SEDATION MEDICATION
Status: COMPLETED | OUTPATIENT
Start: 2020-07-01 | End: 2020-07-01

## 2020-07-01 RX ORDER — LIDOCAINE HYDROCHLORIDE 10 MG/ML
1 INJECTION INFILTRATION; PERINEURAL ONCE
Status: COMPLETED | OUTPATIENT
Start: 2020-07-01 | End: 2020-07-01

## 2020-07-01 RX ORDER — FENTANYL CITRATE 50 UG/ML
INJECTION, SOLUTION INTRAMUSCULAR; INTRAVENOUS CODE/TRAUMA/SEDATION MEDICATION
Status: COMPLETED | OUTPATIENT
Start: 2020-07-01 | End: 2020-07-01

## 2020-07-01 RX ORDER — TOPIRAMATE 25 MG/1
25 TABLET ORAL 2 TIMES DAILY
COMMUNITY
End: 2021-08-02

## 2020-07-01 RX ORDER — PHENAZOPYRIDINE HYDROCHLORIDE 100 MG/1
100 TABLET, FILM COATED ORAL 3 TIMES DAILY PRN
COMMUNITY

## 2020-07-01 RX ORDER — IODIXANOL 320 MG/ML
100 INJECTION, SOLUTION INTRAVASCULAR
Status: COMPLETED | OUTPATIENT
Start: 2020-07-01 | End: 2020-07-01

## 2020-07-01 RX ORDER — SODIUM CHLORIDE 9 MG/ML
INJECTION, SOLUTION INTRAVENOUS CONTINUOUS
Status: DISCONTINUED | OUTPATIENT
Start: 2020-07-01 | End: 2020-07-01 | Stop reason: HOSPADM

## 2020-07-01 RX ORDER — MIDAZOLAM HYDROCHLORIDE 1 MG/ML
INJECTION INTRAMUSCULAR; INTRAVENOUS CODE/TRAUMA/SEDATION MEDICATION
Status: COMPLETED | OUTPATIENT
Start: 2020-07-01 | End: 2020-07-01

## 2020-07-01 RX ADMIN — MIDAZOLAM HYDROCHLORIDE 1 MG: 1 INJECTION, SOLUTION INTRAMUSCULAR; INTRAVENOUS at 01:07

## 2020-07-01 RX ADMIN — IODIXANOL 40 ML: 320 INJECTION, SOLUTION INTRAVASCULAR at 02:07

## 2020-07-01 RX ADMIN — LIDOCAINE HYDROCHLORIDE 0.2 ML: 10 INJECTION, SOLUTION EPIDURAL; INFILTRATION; INTRACAUDAL; PERINEURAL at 10:07

## 2020-07-01 RX ADMIN — MIDAZOLAM HYDROCHLORIDE 0.5 MG: 1 INJECTION, SOLUTION INTRAMUSCULAR; INTRAVENOUS at 02:07

## 2020-07-01 RX ADMIN — SODIUM CHLORIDE: 0.9 INJECTION, SOLUTION INTRAVENOUS at 10:07

## 2020-07-01 RX ADMIN — FENTANYL CITRATE 25 MCG: 50 INJECTION, SOLUTION INTRAMUSCULAR; INTRAVENOUS at 02:07

## 2020-07-01 RX ADMIN — FENTANYL CITRATE 50 MCG: 50 INJECTION, SOLUTION INTRAMUSCULAR; INTRAVENOUS at 01:07

## 2020-07-01 RX ADMIN — ONDANSETRON 4 MG: 2 INJECTION, SOLUTION INTRAMUSCULAR; INTRAVENOUS at 01:07

## 2020-07-01 NOTE — PROGRESS NOTES
Veronica in IR notified that pt's pre-procedure is complete.  RN informed that pt states she currently has a UTI but has not informed her primary care.  RN to inform MD of this.

## 2020-07-01 NOTE — PROGRESS NOTES
Procedure complete, pt tolerated well. Site clean, dry, intact, no bleeding, no hematoma. Pt to recover in ROCU before discharge in care of RN. Report to be given to ROCU RN at bedside. Pedal pulses intact, +2. Pt to lie flat for two hours until 1620.

## 2020-07-01 NOTE — PROCEDURES
Neurointerventional Radiology Post-Procedure Note    Pre Op Diagnosis: ruptured Acomm aneurysm s/p coiling     Post Op Diagnosis: Ruptured Acom aneurysm s/p coiling     Procedure: Cerebral angiogram    Procedure performed by: John MARKHAM, Nathanael Oro MD.    Written Informed Consent Obtained: Yes    Specimen Removed: NO    Estimated Blood Loss: less than 50     Procedure report:     A 6F sheath was placed into the right femoral artery and a 5F Elier catheter was advanced into the aortic arch.  The left ICA arteries were subselected and angiography of the brain was performed after injection into each of these vessels.    Preliminary interpretation: Previously embolized ruptured Acomm aneurysm with stable neck residual. Please see Imaging report for full details.    A right femoral artery angiogram was performed, the sheath removed and hemostasis achieved using Angioseal.  No hematoma was present at the time of hemostasis.    The patient tolerated the procedure well.     Konrad Oro MD  NeuroInterventional Radiology Fellow

## 2020-07-01 NOTE — DISCHARGE INSTRUCTIONS
For scheduling: Call Dorothy at [phone]    For questions or concerns call: APURVA MON-FRI 8 AM- 5PM [phone]. Radiology resident on call [phone].    For immediate concerns that are not emergent, you may call our radiology clinic at: [phone]

## 2020-07-01 NOTE — H&P
Radiology History & Physical      SUBJECTIVE:     Chief Complaint: follow up angiogram in patient with Acomm neurysm    History of Present Illness:  Gissel Jamison is a 65 y.o. female who presents for diagnostic cerebral angiogram in patient with Acomm aneurysm s/p coiling    Past Medical History:   Diagnosis Date    Asthma     Breast cancer     Fibromyalgia     Nontraumatic subarachnoid hemorrhage from anterior communicating artery 7/4/2019    65 yo with hx of breast cancer presents to the ED with complaints of intractable headache after a barium CT abdomen on 7/2. NSGY consulted for SAH found on CT head.  - Neurologically stable on initial assessment. HH2F2  - Stat CTA head ordered - CT head @11am reviewed. Diffuse SAH within basal cisterns and midline falx. No evidence for hydrocephalus on this study. No mass effect or midline shift.     PONV (postoperative nausea and vomiting)     for general anesthesia     Past Surgical History:   Procedure Laterality Date    Arm Surgery      torn tendon    BACK SURGERY      x2    CEREBRAL ANGIOGRAM N/A 7/5/2019    Procedure: ANGIOGRAM-CEREBRAL, acomm aneurysm;  Surgeon: Turner Mckeon MD;  Location: [facility];  Service: Neurosurgery;  Laterality: N/A;    CEREBRAL ANGIOGRAM N/A 11/25/2019    Procedure: ANGIOGRAM-CEREBRAL;  Surgeon: [facility] Provider;  Location: [address];  Service: Radiology;  Laterality: N/A;  /John    HYSTERECTOMY      RENETTA    MASTECTOMY      with reconstruction left breast    OOPHORECTOMY      RADIOFREQUENCY THERMAL COAGULATION OF MEDIAL BRANCH OF POSTERIOR RAMUS OF CERVICAL SPINAL NERVE Left 2/8/2019    Procedure: RADIOFREQUENCY THERMAL COAGULATION, NERVE, SPINAL, CERVICAL, POSTERIOR RAMUS, MEDIAL BRANCH;  Surgeon: Raymond Britton MD;  Location: [facility];  Service: Pain Management;  Laterality: Left;    Tonsillectomy      TONSILLECTOMY         Home Meds:   Prior to Admission medications    Medication Sig Start  Date End Date Taking? Authorizing Provider   eszopiclone (LUNESTA) 3 mg Tab  6/14/20  Yes Historical Provider, MD   levocetirizine (XYZAL) 5 MG tablet Take 5 mg by mouth once daily.   Yes Historical Provider, MD   montelukast (SINGULAIR) 10 mg tablet TAKE 1 TABLET BY ORAL ROUTE EVERY DAY IN THE EVENING 8/13/19  Yes Historical Provider, MD   pantoprazole (PROTONIX) 40 MG tablet Take 40 mg by mouth 2 (two) times daily.  5/22/18  Yes Historical Provider, MD   phenazopyridine (PYRIDIUM) 100 MG tablet Take 100 mg by mouth 3 (three) times daily as needed for Pain.   Yes Historical Provider, MD   topiramate (TOPAMAX) 25 MG tablet Take 25 mg by mouth 2 (two) times daily.   Yes Historical Provider, MD     Anticoagulants/Antiplatelets: no anticoagulation    Allergies:   Review of patient's allergies indicates:   Allergen Reactions    Codeine Nausea And Vomiting    Pcn [penicillins] Nausea And Vomiting    Strawberry     Sulfa (sulfonamide antibiotics) Nausea And Vomiting    Sulfanilamide      Sedation History:  no adverse reactions    Review of Systems:   Hematological: no known coagulopathies  Respiratory: no shortness of breath  Cardiovascular: no chest pain  Gastrointestinal: no abdominal pain  Genito-Urinary: no dysuria  Musculoskeletal: negative  Neurological: no TIA or stroke symptoms         OBJECTIVE:     Vital Signs (Most Recent)  Temp: 98.2 °F (36.8 °C) (07/01/20 0958)  Pulse: 96 (07/01/20 0958)  Resp: 17 (07/01/20 0958)  BP: (!) 190/88 (07/01/20 0958)  SpO2: 100 % (07/01/20 0958)    Physical Exam:  ASA: 2  Mallampati: 2    General: no acute distress  Mental Status: alert and oriented to person, place and time  HEENT: normocephalic, atraumatic  Chest: unlabored breathing  Heart: regular heart rate  Abdomen: nondistended  Extremity: moves all extremities    Laboratory  Lab Results   Component Value Date    INR 0.9 07/01/2020       Lab Results   Component Value Date    WBC 7.08 07/01/2020    HGB 12.5 07/01/2020     HCT 42.9 07/01/2020    MCV 90 07/01/2020     07/01/2020      Lab Results   Component Value Date     (H) 07/01/2020     (H) 07/01/2020    K 4.3 07/01/2020     (H) 07/01/2020    CO2 26 07/01/2020    BUN 20 07/01/2020    CREATININE 1.0 07/01/2020    CALCIUM 9.7 07/01/2020    MG 2.2 07/18/2019    ALT 19 07/01/2020    AST 17 07/01/2020    ALBUMIN 4.0 07/01/2020    BILITOT 0.5 07/01/2020       ASSESSMENT/PLAN:     Sedation Plan: moderate sedation  Patient will undergo : follow up cerebral angiorgam.    Konrad Oro MD  Neuro[facility] fellow.

## 2020-07-01 NOTE — DISCHARGE SUMMARY
Radiology Discharge Summary      Hospital Course: No complications    Admit Date: 7/1/2020  Discharge Date: 07/01/2020     Instructions Given to Patient: Yes  Diet: Resume prior diet  Activity: activity as tolerated    Description of Condition on Discharge: Stable  Vital Signs (Most Recent): Temp: 98.2 °F (36.8 °C) (07/01/20 0958)  Pulse: 91 (07/01/20 1420)  Resp: 16 (07/01/20 1420)  BP: (!) 164/74 (07/01/20 1420)  SpO2: 97 % (07/01/20 1420)    Discharge Disposition: Home    Discharge Diagnosis: previously Ruptured Acomm aneurysm s/p embolization     Follow-up: with NeuroIR in 2 - 4 weeks.     Konrad Oro MD  NeuroIR fellow.

## 2020-07-01 NOTE — PROGRESS NOTES
MD informed of pt's current UTI and will put in an order for urinalysis.  Result of urinalysis will determine if case will proceed.  Pt updated and verbalizes understanding.

## 2021-05-16 ENCOUNTER — PATIENT MESSAGE (OUTPATIENT)
Dept: NEUROLOGY | Facility: CLINIC | Age: 66
End: 2021-05-16

## 2021-05-18 ENCOUNTER — TELEPHONE (OUTPATIENT)
Dept: NEUROLOGY | Facility: CLINIC | Age: 66
End: 2021-05-18

## 2021-05-24 DIAGNOSIS — I67.1 ANEURYSM OF ANTERIOR COMMUNICATING ARTERY: Primary | ICD-10-CM

## 2021-05-24 DIAGNOSIS — I67.1 CEREBRAL ANEURYSM, NONRUPTURED: ICD-10-CM

## 2021-05-28 ENCOUNTER — TELEPHONE (OUTPATIENT)
Dept: NEUROLOGY | Facility: CLINIC | Age: 66
End: 2021-05-28

## 2021-05-31 ENCOUNTER — PATIENT MESSAGE (OUTPATIENT)
Dept: NEUROLOGY | Facility: CLINIC | Age: 66
End: 2021-05-31

## 2021-06-09 ENCOUNTER — TELEPHONE (OUTPATIENT)
Dept: NEUROLOGY | Facility: CLINIC | Age: 66
End: 2021-06-09

## 2021-06-17 ENCOUNTER — PATIENT MESSAGE (OUTPATIENT)
Dept: NEUROLOGY | Facility: CLINIC | Age: 66
End: 2021-06-17

## 2021-06-22 ENCOUNTER — TELEPHONE (OUTPATIENT)
Dept: NEUROLOGY | Facility: CLINIC | Age: 66
End: 2021-06-22

## 2021-07-15 ENCOUNTER — HOSPITAL ENCOUNTER (EMERGENCY)
Facility: HOSPITAL | Age: 66
Discharge: HOME | End: 2021-07-15
Attending: EMERGENCY MEDICINE
Payer: COMMERCIAL

## 2021-07-15 VITALS
SYSTOLIC BLOOD PRESSURE: 154 MMHG | HEART RATE: 82 BPM | BODY MASS INDEX: 24.99 KG/M2 | WEIGHT: 150 LBS | TEMPERATURE: 98 F | HEIGHT: 65 IN | OXYGEN SATURATION: 100 % | DIASTOLIC BLOOD PRESSURE: 87 MMHG | RESPIRATION RATE: 18 BRPM

## 2021-07-15 DIAGNOSIS — N20.0 KIDNEY STONE: Primary | ICD-10-CM

## 2021-07-15 DIAGNOSIS — M54.6 ACUTE LEFT-SIDED THORACIC BACK PAIN: ICD-10-CM

## 2021-07-15 LAB
ALBUMIN SERPL BCP-MCNC: 4.8 G/DL (ref 3.5–5.2)
ALP SERPL-CCNC: 125 U/L (ref 38–126)
ALT SERPL W/O P-5'-P-CCNC: 16 U/L (ref 10–44)
ANION GAP SERPL CALC-SCNC: 15 MMOL/L (ref 8–16)
AST SERPL-CCNC: 29 U/L (ref 15–46)
BACTERIA #/AREA URNS AUTO: ABNORMAL /HPF
BASOPHILS # BLD AUTO: 0.04 K/UL (ref 0–0.2)
BASOPHILS NFR BLD: 0.3 % (ref 0–1.9)
BILIRUB SERPL-MCNC: 0.8 MG/DL (ref 0.1–1)
BILIRUB UR QL STRIP: NEGATIVE
CALCIUM SERPL-MCNC: 11.1 MG/DL (ref 8.7–10.5)
CHLORIDE SERPL-SCNC: 105 MMOL/L (ref 95–110)
CLARITY UR REFRACT.AUTO: ABNORMAL
CO2 SERPL-SCNC: 20 MMOL/L (ref 23–29)
COLOR UR AUTO: YELLOW
CREAT SERPL-MCNC: 1.29 MG/DL (ref 0.5–1.4)
DIFFERENTIAL METHOD: ABNORMAL
EOSINOPHIL # BLD AUTO: 0 K/UL (ref 0–0.5)
EOSINOPHIL NFR BLD: 0.1 % (ref 0–8)
ERYTHROCYTE [DISTWIDTH] IN BLOOD BY AUTOMATED COUNT: 13.4 % (ref 11.5–14.5)
EST. GFR  (AFRICAN AMERICAN): 50.2 ML/MIN/1.73 M^2
EST. GFR  (NON AFRICAN AMERICAN): 43.6 ML/MIN/1.73 M^2
GLUCOSE SERPL-MCNC: 144 MG/DL (ref 70–110)
GLUCOSE UR QL STRIP: NEGATIVE
HCT VFR BLD AUTO: 43.7 % (ref 37–48.5)
HGB BLD-MCNC: 13.7 G/DL (ref 12–16)
HGB UR QL STRIP: ABNORMAL
HYALINE CASTS UR QL AUTO: 0 /LPF
IMM GRANULOCYTES # BLD AUTO: 0.06 K/UL (ref 0–0.04)
IMM GRANULOCYTES NFR BLD AUTO: 0.4 % (ref 0–0.5)
KETONES UR QL STRIP: ABNORMAL
LEUKOCYTE ESTERASE UR QL STRIP: ABNORMAL
LIPASE SERPL-CCNC: 153 U/L (ref 23–300)
LYMPHOCYTES # BLD AUTO: 2.5 K/UL (ref 1–4.8)
LYMPHOCYTES NFR BLD: 16.4 % (ref 18–48)
MAGNESIUM SERPL-MCNC: 2.2 MG/DL (ref 1.6–2.6)
MCH RBC QN AUTO: 26.8 PG (ref 27–31)
MCHC RBC AUTO-ENTMCNC: 31.4 G/DL (ref 32–36)
MCV RBC AUTO: 85 FL (ref 82–98)
MICROSCOPIC COMMENT: ABNORMAL
MONOCYTES # BLD AUTO: 1.2 K/UL (ref 0.3–1)
MONOCYTES NFR BLD: 7.8 % (ref 4–15)
NEUTROPHILS # BLD AUTO: 11.2 K/UL (ref 1.8–7.7)
NEUTROPHILS NFR BLD: 75 % (ref 38–73)
NITRITE UR QL STRIP: NEGATIVE
NRBC BLD-RTO: 0 /100 WBC
PH UR STRIP: 5 [PH] (ref 5–8)
PLATELET # BLD AUTO: 329 K/UL (ref 150–450)
PMV BLD AUTO: 9.6 FL (ref 9.2–12.9)
POTASSIUM SERPL-SCNC: 3.9 MMOL/L (ref 3.5–5.1)
PROT SERPL-MCNC: 8.1 G/DL (ref 6–8.4)
PROT UR QL STRIP: ABNORMAL
RBC # BLD AUTO: 5.12 M/UL (ref 4–5.4)
RBC #/AREA URNS AUTO: 15 /HPF (ref 0–4)
SODIUM SERPL-SCNC: 140 MMOL/L (ref 136–145)
SP GR UR STRIP: 1.03 (ref 1–1.03)
URN SPEC COLLECT METH UR: ABNORMAL
UROBILINOGEN UR STRIP-ACNC: NEGATIVE EU/DL
UUN UR-MCNC: 23 MG/DL (ref 7–17)
WBC # BLD AUTO: 14.96 K/UL (ref 3.9–12.7)
WBC #/AREA URNS AUTO: 5 /HPF (ref 0–5)

## 2021-07-15 PROCEDURE — 99285 EMERGENCY DEPT VISIT HI MDM: CPT | Mod: 25,ER

## 2021-07-15 PROCEDURE — 80053 COMPREHEN METABOLIC PANEL: CPT | Mod: ER | Performed by: EMERGENCY MEDICINE

## 2021-07-15 PROCEDURE — 81000 URINALYSIS NONAUTO W/SCOPE: CPT | Mod: ER | Performed by: EMERGENCY MEDICINE

## 2021-07-15 PROCEDURE — 96375 TX/PRO/DX INJ NEW DRUG ADDON: CPT | Mod: ER

## 2021-07-15 PROCEDURE — 83690 ASSAY OF LIPASE: CPT | Mod: ER | Performed by: EMERGENCY MEDICINE

## 2021-07-15 PROCEDURE — 63600175 PHARM REV CODE 636 W HCPCS: Mod: ER | Performed by: EMERGENCY MEDICINE

## 2021-07-15 PROCEDURE — 83735 ASSAY OF MAGNESIUM: CPT | Mod: ER | Performed by: EMERGENCY MEDICINE

## 2021-07-15 PROCEDURE — 96374 THER/PROPH/DIAG INJ IV PUSH: CPT | Mod: ER

## 2021-07-15 PROCEDURE — 85025 COMPLETE CBC W/AUTO DIFF WBC: CPT | Mod: ER | Performed by: EMERGENCY MEDICINE

## 2021-07-15 RX ORDER — ONDANSETRON 4 MG/1
4 TABLET, ORALLY DISINTEGRATING ORAL ONCE
Qty: 1 TABLET | Refills: 0 | Status: SHIPPED | OUTPATIENT
Start: 2021-07-15 | End: 2021-07-15

## 2021-07-15 RX ORDER — KETOROLAC TROMETHAMINE 30 MG/ML
10 INJECTION, SOLUTION INTRAMUSCULAR; INTRAVENOUS
Status: COMPLETED | OUTPATIENT
Start: 2021-07-15 | End: 2021-07-15

## 2021-07-15 RX ORDER — TAMSULOSIN HYDROCHLORIDE 0.4 MG/1
0.4 CAPSULE ORAL DAILY
Qty: 10 CAPSULE | Refills: 0 | Status: SHIPPED | OUTPATIENT
Start: 2021-07-15 | End: 2022-07-15

## 2021-07-15 RX ORDER — ONDANSETRON 2 MG/ML
4 INJECTION INTRAMUSCULAR; INTRAVENOUS
Status: COMPLETED | OUTPATIENT
Start: 2021-07-15 | End: 2021-07-15

## 2021-07-15 RX ORDER — OXYCODONE AND ACETAMINOPHEN 5; 325 MG/1; MG/1
1 TABLET ORAL EVERY 4 HOURS PRN
Qty: 18 TABLET | Refills: 0 | Status: SHIPPED | OUTPATIENT
Start: 2021-07-15

## 2021-07-15 RX ORDER — FENTANYL CITRATE 50 UG/ML
50 INJECTION, SOLUTION INTRAMUSCULAR; INTRAVENOUS
Status: COMPLETED | OUTPATIENT
Start: 2021-07-15 | End: 2021-07-15

## 2021-07-15 RX ADMIN — KETOROLAC TROMETHAMINE 10 MG: 30 INJECTION, SOLUTION INTRAMUSCULAR; INTRAVENOUS at 11:07

## 2021-07-15 RX ADMIN — ONDANSETRON 4 MG: 2 INJECTION INTRAMUSCULAR; INTRAVENOUS at 11:07

## 2021-07-15 RX ADMIN — FENTANYL CITRATE 50 MCG: 50 INJECTION INTRAMUSCULAR; INTRAVENOUS at 11:07

## 2021-07-20 ENCOUNTER — TELEPHONE (OUTPATIENT)
Dept: NEUROLOGY | Facility: CLINIC | Age: 66
End: 2021-07-20

## 2021-07-21 ENCOUNTER — TELEPHONE (OUTPATIENT)
Dept: NEUROLOGY | Facility: CLINIC | Age: 66
End: 2021-07-21

## 2021-07-21 ENCOUNTER — DOCUMENTATION ONLY (OUTPATIENT)
Dept: NEUROLOGY | Facility: HOSPITAL | Age: 66
End: 2021-07-21

## 2021-07-21 ENCOUNTER — OFFICE VISIT (OUTPATIENT)
Dept: NEUROLOGY | Facility: CLINIC | Age: 66
End: 2021-07-21
Payer: COMMERCIAL

## 2021-07-21 VITALS
HEIGHT: 65 IN | BODY MASS INDEX: 24.75 KG/M2 | DIASTOLIC BLOOD PRESSURE: 94 MMHG | WEIGHT: 148.56 LBS | HEART RATE: 108 BPM | SYSTOLIC BLOOD PRESSURE: 138 MMHG

## 2021-07-21 DIAGNOSIS — R51.9 INTRACTABLE HEADACHE, UNSPECIFIED CHRONICITY PATTERN, UNSPECIFIED HEADACHE TYPE: Primary | ICD-10-CM

## 2021-07-21 DIAGNOSIS — I67.1 ANEURYSM OF ANTERIOR COMMUNICATING ARTERY: ICD-10-CM

## 2021-07-21 PROCEDURE — 3288F PR FALLS RISK ASSESSMENT DOCUMENTED: ICD-10-PCS | Mod: CPTII,S$GLB,, | Performed by: NURSE PRACTITIONER

## 2021-07-21 PROCEDURE — 99999 PR PBB SHADOW E&M-EST. PATIENT-LVL III: CPT | Mod: PBBFAC,,, | Performed by: NURSE PRACTITIONER

## 2021-07-21 PROCEDURE — 99214 PR OFFICE/OUTPT VISIT, EST, LEVL IV, 30-39 MIN: ICD-10-PCS | Mod: S$GLB,,, | Performed by: NURSE PRACTITIONER

## 2021-07-21 PROCEDURE — 1125F PR PAIN SEVERITY QUANTIFIED, PAIN PRESENT: ICD-10-PCS | Mod: CPTII,S$GLB,, | Performed by: NURSE PRACTITIONER

## 2021-07-21 PROCEDURE — 1101F PR PT FALLS ASSESS DOC 0-1 FALLS W/OUT INJ PAST YR: ICD-10-PCS | Mod: CPTII,S$GLB,, | Performed by: NURSE PRACTITIONER

## 2021-07-21 PROCEDURE — 1125F AMNT PAIN NOTED PAIN PRSNT: CPT | Mod: CPTII,S$GLB,, | Performed by: NURSE PRACTITIONER

## 2021-07-21 PROCEDURE — 3008F PR BODY MASS INDEX (BMI) DOCUMENTED: ICD-10-PCS | Mod: CPTII,S$GLB,, | Performed by: NURSE PRACTITIONER

## 2021-07-21 PROCEDURE — 1101F PT FALLS ASSESS-DOCD LE1/YR: CPT | Mod: CPTII,S$GLB,, | Performed by: NURSE PRACTITIONER

## 2021-07-21 PROCEDURE — 99999 PR PBB SHADOW E&M-EST. PATIENT-LVL III: ICD-10-PCS | Mod: PBBFAC,,, | Performed by: NURSE PRACTITIONER

## 2021-07-21 PROCEDURE — 3008F BODY MASS INDEX DOCD: CPT | Mod: CPTII,S$GLB,, | Performed by: NURSE PRACTITIONER

## 2021-07-21 PROCEDURE — 99214 OFFICE O/P EST MOD 30 MIN: CPT | Mod: S$GLB,,, | Performed by: NURSE PRACTITIONER

## 2021-07-21 PROCEDURE — 3288F FALL RISK ASSESSMENT DOCD: CPT | Mod: CPTII,S$GLB,, | Performed by: NURSE PRACTITIONER

## 2021-07-21 RX ORDER — BROMPHENIRAMINE MALEATE, PSEUDOEPHEDRINE HYDROCHLORIDE, AND DEXTROMETHORPHAN HYDROBROMIDE 2; 30; 10 MG/5ML; MG/5ML; MG/5ML
SYRUP ORAL
COMMUNITY
Start: 2021-03-10

## 2021-07-28 ENCOUNTER — TELEPHONE (OUTPATIENT)
Dept: ADMINISTRATIVE | Facility: OTHER | Age: 66
End: 2021-07-28

## 2021-07-29 ENCOUNTER — TELEPHONE (OUTPATIENT)
Dept: EMERGENCY MEDICINE | Facility: HOSPITAL | Age: 66
End: 2021-07-29

## 2021-08-02 ENCOUNTER — OFFICE VISIT (OUTPATIENT)
Dept: NEUROLOGY | Facility: CLINIC | Age: 66
End: 2021-08-02
Payer: COMMERCIAL

## 2021-08-02 ENCOUNTER — PATIENT MESSAGE (OUTPATIENT)
Dept: NEUROLOGY | Facility: CLINIC | Age: 66
End: 2021-08-02

## 2021-08-02 DIAGNOSIS — G43.009 MIGRAINE WITHOUT AURA AND WITHOUT STATUS MIGRAINOSUS, NOT INTRACTABLE: Primary | ICD-10-CM

## 2021-08-02 DIAGNOSIS — R11.0 NAUSEA: ICD-10-CM

## 2021-08-02 DIAGNOSIS — R51.9 NONINTRACTABLE HEADACHE, UNSPECIFIED CHRONICITY PATTERN, UNSPECIFIED HEADACHE TYPE: ICD-10-CM

## 2021-08-02 PROCEDURE — 99215 PR OFFICE/OUTPT VISIT, EST, LEVL V, 40-54 MIN: ICD-10-PCS | Mod: 95,,, | Performed by: PHYSICIAN ASSISTANT

## 2021-08-02 PROCEDURE — 1159F MED LIST DOCD IN RCRD: CPT | Mod: CPTII,95,, | Performed by: PHYSICIAN ASSISTANT

## 2021-08-02 PROCEDURE — 99215 OFFICE O/P EST HI 40 MIN: CPT | Mod: 95,,, | Performed by: PHYSICIAN ASSISTANT

## 2021-08-02 PROCEDURE — 1160F PR REVIEW ALL MEDS BY PRESCRIBER/CLIN PHARMACIST DOCUMENTED: ICD-10-PCS | Mod: CPTII,95,, | Performed by: PHYSICIAN ASSISTANT

## 2021-08-02 PROCEDURE — 1159F PR MEDICATION LIST DOCUMENTED IN MEDICAL RECORD: ICD-10-PCS | Mod: CPTII,95,, | Performed by: PHYSICIAN ASSISTANT

## 2021-08-02 PROCEDURE — 1160F RVW MEDS BY RX/DR IN RCRD: CPT | Mod: CPTII,95,, | Performed by: PHYSICIAN ASSISTANT

## 2021-08-02 RX ORDER — ONDANSETRON 4 MG/1
4 TABLET, ORALLY DISINTEGRATING ORAL EVERY 6 HOURS PRN
Qty: 12 TABLET | Refills: 3 | Status: SHIPPED | OUTPATIENT
Start: 2021-08-02

## 2021-08-02 RX ORDER — SUMATRIPTAN 50 MG/1
TABLET, FILM COATED ORAL
Qty: 12 TABLET | Refills: 2 | Status: SHIPPED | OUTPATIENT
Start: 2021-08-02

## 2021-08-02 RX ORDER — VERAPAMIL HYDROCHLORIDE 40 MG/1
40 TABLET ORAL DAILY
Qty: 30 TABLET | Refills: 2 | Status: SHIPPED | OUTPATIENT
Start: 2021-08-02 | End: 2021-10-31

## 2021-09-02 ENCOUNTER — PATIENT MESSAGE (OUTPATIENT)
Dept: NEUROLOGY | Facility: CLINIC | Age: 66
End: 2021-09-02

## 2021-09-03 ENCOUNTER — PATIENT MESSAGE (OUTPATIENT)
Dept: NEUROLOGY | Facility: CLINIC | Age: 66
End: 2021-09-03

## 2021-11-12 ENCOUNTER — TELEPHONE (OUTPATIENT)
Dept: OBSTETRICS AND GYNECOLOGY | Facility: CLINIC | Age: 66
End: 2021-11-12
Payer: COMMERCIAL

## 2023-02-16 NOTE — ASSESSMENT & PLAN NOTE
Left message for patient.    Ok to keep scheduled appointment if no problems with nail bed.   S/p coiling

## 2023-06-16 NOTE — CARE UPDATE
Patient arrived to [facility] in NAD. Patient placed on monitors. See assessment and vs.  
Patient states full understnading of discharge instructions. Patient to leave at 16:20.  
Sl removed without s/s of bleeding or hematoma. Patient verbalized understanding of d/c. Patient wheeled to front of hospital to meet .  
Discharged
